# Patient Record
Sex: MALE | Race: WHITE | NOT HISPANIC OR LATINO | Employment: FULL TIME | URBAN - METROPOLITAN AREA
[De-identification: names, ages, dates, MRNs, and addresses within clinical notes are randomized per-mention and may not be internally consistent; named-entity substitution may affect disease eponyms.]

---

## 2017-01-23 ENCOUNTER — APPOINTMENT (OUTPATIENT)
Dept: LAB | Age: 52
End: 2017-01-23
Payer: COMMERCIAL

## 2017-01-23 ENCOUNTER — HOSPITAL ENCOUNTER (OUTPATIENT)
Dept: RADIOLOGY | Age: 52
Discharge: HOME | End: 2017-01-23
Payer: COMMERCIAL

## 2017-01-23 ENCOUNTER — TRANSCRIBE ORDERS (OUTPATIENT)
Dept: ADMINISTRATIVE | Age: 52
End: 2017-01-23

## 2017-01-23 DIAGNOSIS — N20.0 CALCULUS OF KIDNEY: ICD-10-CM

## 2017-01-23 DIAGNOSIS — N20.0 KIDNEY STONE: ICD-10-CM

## 2017-01-23 DIAGNOSIS — N20.0 KIDNEY STONE: Primary | ICD-10-CM

## 2017-01-23 LAB — PSA SERPL-MCNC: 0.5 NG/ML (ref 0–4)

## 2017-01-23 PROCEDURE — 76770 US EXAM ABDO BACK WALL COMP: CPT

## 2017-01-23 PROCEDURE — 36415 COLL VENOUS BLD VENIPUNCTURE: CPT

## 2017-01-23 PROCEDURE — 74000 HB X-RAY EXAM OF ABDOMEN (SINGLE ANTEROPOSTERIOR VIEW): CPT

## 2017-01-23 PROCEDURE — G0103 PSA SCREENING: HCPCS

## 2017-01-25 ENCOUNTER — TRANSCRIBE ORDERS (OUTPATIENT)
Dept: ADMINISTRATIVE | Facility: HOSPITAL | Age: 52
End: 2017-01-25

## 2017-01-25 ENCOUNTER — ALLSCRIPTS OFFICE VISIT (OUTPATIENT)
Dept: OTHER | Facility: OTHER | Age: 52
End: 2017-01-25

## 2017-01-25 DIAGNOSIS — N28.1 ACQUIRED CYST OF KIDNEY: Primary | ICD-10-CM

## 2017-01-25 DIAGNOSIS — N28.1 ACQUIRED CYST OF KIDNEY: ICD-10-CM

## 2017-02-14 ENCOUNTER — APPOINTMENT (OUTPATIENT)
Dept: LAB | Age: 52
End: 2017-02-14
Payer: COMMERCIAL

## 2017-02-14 DIAGNOSIS — N28.1 ACQUIRED CYST OF KIDNEY: ICD-10-CM

## 2017-02-14 LAB
ANION GAP SERPL CALCULATED.3IONS-SCNC: 6 MMOL/L (ref 4–13)
BUN SERPL-MCNC: 22 MG/DL (ref 5–25)
CALCIUM SERPL-MCNC: 9.3 MG/DL (ref 8.3–10.1)
CHLORIDE SERPL-SCNC: 105 MMOL/L (ref 100–108)
CO2 SERPL-SCNC: 29 MMOL/L (ref 21–32)
CREAT SERPL-MCNC: 1.09 MG/DL (ref 0.6–1.3)
GFR SERPL CREATININE-BSD FRML MDRD: >60 ML/MIN/1.73SQ M
GLUCOSE SERPL-MCNC: 100 MG/DL (ref 65–140)
POTASSIUM SERPL-SCNC: 4.6 MMOL/L (ref 3.5–5.3)
SODIUM SERPL-SCNC: 140 MMOL/L (ref 136–145)

## 2017-02-14 PROCEDURE — 36415 COLL VENOUS BLD VENIPUNCTURE: CPT

## 2017-02-14 PROCEDURE — 80048 BASIC METABOLIC PNL TOTAL CA: CPT

## 2017-02-21 ENCOUNTER — HOSPITAL ENCOUNTER (OUTPATIENT)
Dept: RADIOLOGY | Age: 52
Discharge: HOME | End: 2017-02-21
Payer: COMMERCIAL

## 2017-02-21 DIAGNOSIS — N28.1 ACQUIRED CYST OF KIDNEY: ICD-10-CM

## 2017-02-21 PROCEDURE — 74178 CT ABD&PLV WO CNTR FLWD CNTR: CPT

## 2017-02-21 RX ADMIN — IOHEXOL 100 ML: 350 INJECTION, SOLUTION INTRAVENOUS at 08:39

## 2017-03-10 ENCOUNTER — ALLSCRIPTS OFFICE VISIT (OUTPATIENT)
Dept: OTHER | Facility: OTHER | Age: 52
End: 2017-03-10

## 2017-08-01 DIAGNOSIS — E78.1 PURE HYPERGLYCERIDEMIA: ICD-10-CM

## 2017-08-01 DIAGNOSIS — E78.5 HYPERLIPIDEMIA: ICD-10-CM

## 2017-08-01 DIAGNOSIS — Z00.00 ENCOUNTER FOR GENERAL ADULT MEDICAL EXAMINATION WITHOUT ABNORMAL FINDINGS: ICD-10-CM

## 2017-08-01 DIAGNOSIS — Z12.5 ENCOUNTER FOR SCREENING FOR MALIGNANT NEOPLASM OF PROSTATE: ICD-10-CM

## 2017-08-01 DIAGNOSIS — E78.00 PURE HYPERCHOLESTEROLEMIA: ICD-10-CM

## 2017-08-17 ENCOUNTER — GENERIC CONVERSION - ENCOUNTER (OUTPATIENT)
Dept: OTHER | Facility: OTHER | Age: 52
End: 2017-08-17

## 2017-08-17 ENCOUNTER — TRANSCRIBE ORDERS (OUTPATIENT)
Dept: LAB | Age: 52
End: 2017-08-17

## 2017-08-17 ENCOUNTER — APPOINTMENT (OUTPATIENT)
Dept: LAB | Age: 52
End: 2017-08-17
Payer: COMMERCIAL

## 2017-08-17 DIAGNOSIS — Z00.00 ENCOUNTER FOR GENERAL ADULT MEDICAL EXAMINATION WITHOUT ABNORMAL FINDINGS: ICD-10-CM

## 2017-08-17 DIAGNOSIS — E78.1 PURE HYPERGLYCERIDEMIA: ICD-10-CM

## 2017-08-17 DIAGNOSIS — E78.00 PURE HYPERCHOLESTEROLEMIA: ICD-10-CM

## 2017-08-17 LAB
ALBUMIN SERPL BCP-MCNC: 4 G/DL (ref 3.5–5)
ALP SERPL-CCNC: 61 U/L (ref 46–116)
ALT SERPL W P-5'-P-CCNC: 34 U/L (ref 12–78)
ANION GAP SERPL CALCULATED.3IONS-SCNC: 6 MMOL/L (ref 4–13)
AST SERPL W P-5'-P-CCNC: 17 U/L (ref 5–45)
BILIRUB SERPL-MCNC: 0.47 MG/DL (ref 0.2–1)
BUN SERPL-MCNC: 23 MG/DL (ref 5–25)
CALCIUM SERPL-MCNC: 9.5 MG/DL (ref 8.3–10.1)
CHLORIDE SERPL-SCNC: 106 MMOL/L (ref 100–108)
CHOLEST SERPL-MCNC: 245 MG/DL (ref 50–200)
CO2 SERPL-SCNC: 28 MMOL/L (ref 21–32)
CREAT SERPL-MCNC: 1.17 MG/DL (ref 0.6–1.3)
GFR SERPL CREATININE-BSD FRML MDRD: 70 ML/MIN/1.73SQ M
GLUCOSE P FAST SERPL-MCNC: 90 MG/DL (ref 65–99)
HDLC SERPL-MCNC: 62 MG/DL (ref 40–60)
LDLC SERPL CALC-MCNC: 147 MG/DL (ref 0–100)
POTASSIUM SERPL-SCNC: 5.1 MMOL/L (ref 3.5–5.3)
PROT SERPL-MCNC: 7.5 G/DL (ref 6.4–8.2)
PSA SERPL-MCNC: 0.3 NG/ML (ref 0–4)
SODIUM SERPL-SCNC: 140 MMOL/L (ref 136–145)
TRIGL SERPL-MCNC: 179 MG/DL

## 2017-08-17 PROCEDURE — 36415 COLL VENOUS BLD VENIPUNCTURE: CPT

## 2017-08-17 PROCEDURE — 80061 LIPID PANEL: CPT

## 2017-08-17 PROCEDURE — 80053 COMPREHEN METABOLIC PANEL: CPT

## 2017-08-17 PROCEDURE — G0103 PSA SCREENING: HCPCS

## 2017-08-18 ENCOUNTER — GENERIC CONVERSION - ENCOUNTER (OUTPATIENT)
Dept: OTHER | Facility: OTHER | Age: 52
End: 2017-08-18

## 2017-08-29 ENCOUNTER — ALLSCRIPTS OFFICE VISIT (OUTPATIENT)
Dept: OTHER | Facility: OTHER | Age: 52
End: 2017-08-29

## 2017-12-26 ENCOUNTER — ALLSCRIPTS OFFICE VISIT (OUTPATIENT)
Dept: OTHER | Facility: OTHER | Age: 52
End: 2017-12-26

## 2017-12-27 NOTE — PROGRESS NOTES
Assessment   1  URI (upper respiratory infection) (465 9) (J06 9)    Plan   URI (upper respiratory infection)    · Zithromax Z-Pedro 250 MG Oral Tablet (Azithromycin); TAKE 2 TABLETS ON DAY 1    THEN TAKE 1 TABLET A DAY FOR 4 DAYS   · Follow Up if Not Better Evaluation and Treatment  Follow-up  Status: Complete  Done:    76DGH8903 02:04PM   · Drink at least 6 glasses of water or juice a day ; Status:Complete;   Done: 83HZM9516    02:04PM   · The following home treatments may soothe a sore throat ; Status:Complete;   Done:    94YBS1216 02:04PM   · Use a cough medicine to help you get adequate rest ; Status:Complete;   Done:    71QQS6589 02:04PM   · Call [phone] if: The symptoms are not better in 7 days ; Status:Complete;   Done:    76XLQ2682 02:04PM   · Call [phone] if: The symptoms seem worse ; Status:Complete;   Done:    87RMO5143 02:04PM    Discussion/Summary      Continue with symptom treatment for cough  Chief Complaint   1  Fever   2  Nasal Symptoms    History of Present Illness   HPI: Patient presents with a five-day history of sore throat with nasal congestion and cough at times productive of clear yellow phlegm  Over the weekend he started with a T to 102  he has been using Mucinex D  Nonsmoker  Up-to-date with flu vaccine  Review of Systems        Constitutional: feeling poorly, but-- as noted in HPI       ENT: as noted in HPI-- and-- no nasal discharge--       no earache  Cardiovascular: no chest pain  Respiratory: as noted in HPI,-- no shortness of breath-- and-- no wheezing  Gastrointestinal: no nausea,-- no vomiting-- and-- no diarrhea  Musculoskeletal: no myalgias  Neurological: no headache  Active Problems   1  Acute sinusitis (461 9) (J01 90)   2  Complex renal cyst (753 10) (N28 1)   3  Essential hypertriglyceridemia (272 1) (E78 1)   4  Hematochezia (578 1) (K92 1)   5  Hypercholesterolemia (272 0) (E78 00)   6  Hyperlipidemia (272 4) (E78 5)   7  Need for immunization against influenza (V04 81) (Z23)   8  Special screening examination for neoplasm of prostate (V76 44) (Z12 5)   9  Well adult exam (V70 0) (Z00 00)    Past Medical History   1  History of acute sinusitis (V12 69) (Z87 09)   2  History of allergic rhinitis (V12 69) (Z87 09)   3  History of chronic sinusitis (V12 69) (Z87 09)   4  History of hyperlipidemia (V12 29) (Z86 39)    Family History   Mother    1  Family history of kidney stones (V18 69) (Z84 1)   2  Family history of Hyperlipidemia   3  Family history of Hypertension (V17 49)  Father    4  Family history of Hyperlipidemia  Brother    5  Family history of kidney stones (V18 69) (Z84 1)    Social History    ·    · Never A Smoker   · Occasional alcohol use    Surgical History   1  History of Cholecystectomy   2  History of Hernia Repair   3  History of Knee Surgery   4  History of Shoulder Surgery    Current Meds    1  Aspirin 81 MG TABS; Therapy: (Recorded:34Ipg8012) to Recorded   2  Atorvastatin Calcium 80 MG Oral Tablet; TAKE 1 TABLET AT BEDTIME; Therapy: 66LED6326 to (Evaluate:68Dny2718)  Requested for: 76TQV0762; Last     Rx:45Duc9875 Ordered   3  Fenofibrate 160 MG Oral Tablet; Take 1 tablet daily; Therapy: 27AXY9130 to (Evaluate:27Bev7118)  Requested for: 52Azx3439; Last     Rx:32Xvn9970 Ordered   4  Magnesium 250 MG Oral Tablet; Therapy: ([place] ) to Recorded   5  Multivitamin TABS; Therapy: ([place] ) to Recorded   6  Super B Complex TABS; Therapy: ([place] ) to Recorded   7  Vitamin D3 1000 UNIT Oral Capsule; Therapy: ([place] ) to Recorded   8  Vitamin E 400 UNIT Oral Capsule; Therapy: (Recorded:14Trp1114) to Recorded    Allergies   1   No Known Drug Allergies    Vitals    Recorded: 75LWN7805 01:47PM   Temperature 95 9 F   Heart Rate 84   Respiration 16   Systolic 686, LUE, Sitting   Diastolic 64, LUE, Sitting   BP CUFF SIZE Large   Height 6 ft 1 in Weight 227 lb 6 4 oz   BMI Calculated 30   BSA Calculated 2 27     Physical Exam        Constitutional      General appearance: No acute distress, well appearing and well nourished  Head and Face      Palpation of the face and sinuses: No sinus tenderness  Eyes      Conjunctiva and lids: No erythema, swelling or discharge  Ears, Nose, Mouth, and Throat      Otoscopic examination: Tympanic membranes translucent with normal light reflex  Canals patent without erythema  Oropharynx: Normal with no erythema, edema, exudate or lesions  Neck      Neck: Supple, symmetric, trachea midline, no masses  Thyroid: Normal, no thyromegaly  Pulmonary      Respiratory effort: No increased work of breathing or signs of respiratory distress  Auscultation of lungs: Clear to auscultation  Cardiovascular      Auscultation of heart: Normal rate and rhythm, normal S1 and S2, no murmurs  Lymphatic      Palpation of lymph nodes in neck: No lymphadenopathy  Skin      Skin and subcutaneous tissue: Normal without rashes or lesions         Future Appointments      Date/Time Provider Specialty Site   03/14/2018 09:00 AM Bharath Kramer, [address] FOR UROLOGY [place]     Signatures    Electronically signed by : BEKA Ponce ; Dec 26 2017  2:05PM EST                       (Author)

## 2018-01-12 NOTE — RESULT NOTES
Discussion/Summary   Normal PSA      Verified Results  (1) PSA (SCREEN) (Dx V76 44 Screen for Prostate Cancer) 48RMR0114 07:12AM [facility]     Test Name Result Flag Reference   PROSTATE SPECIFIC ANTIGEN 0 3 ng/mL  0 0-4 0   American Urological Association Guidelines define biochemical recurrence of prostate cancer as a detectable or rising PSA value post-radical prostatectomy that is greater than or equal to 0 2 ng/mL with a second confirmatory level of greater than or equal to 0 2 ng/mL  This is a patient instruction: This test is non-fasting  Please drink two glasses of water morning of bloodwork

## 2018-01-13 VITALS
WEIGHT: 222 LBS | RESPIRATION RATE: 16 BRPM | TEMPERATURE: 96.6 F | BODY MASS INDEX: 29.42 KG/M2 | DIASTOLIC BLOOD PRESSURE: 78 MMHG | HEIGHT: 73 IN | SYSTOLIC BLOOD PRESSURE: 122 MMHG | HEART RATE: 68 BPM

## 2018-01-14 VITALS
HEIGHT: 73 IN | DIASTOLIC BLOOD PRESSURE: 80 MMHG | WEIGHT: 224 LBS | HEART RATE: 90 BPM | SYSTOLIC BLOOD PRESSURE: 132 MMHG | BODY MASS INDEX: 29.69 KG/M2

## 2018-01-14 NOTE — RESULT NOTES
Discussion/Summary   Labs are stable but the bad cholesterol went up compared to last time  We can review at our visit  Verified Results  (1) LIPID PANEL, FASTING 40Sho7894 07:12AM Gina Mccarthy Order Number: [id number]     Test Name Result Flag Reference   CHOLESTEROL 245 mg/dL H    HDL,DIRECT 62 mg/dL H 40-60   Specimen collection should occur prior to Metamizole administration due to the potential for falsley depressed results  LDL CHOLESTEROL CALCULATED 147 mg/dL H 0-100   Triglyceride:        Normal ??? ??? ??? ??? ??? ??? ??? <150 mg/dl   ??? ??? ???Borderline High ??? ??? 150-199 mg/dl   ??? ??? ? ?? High ??? ??? ??? ??? ??? ??? ??? 200-499 mg/dl   ??? ??? ? ??Very High ??? ??? ??? ??? ??? >499 mg/dl      Cholesterol:       Desirable ??? ??? ??? ??? <200 mg/dl   ??? ??? Borderline High ??? 200-239 mg/dl   ??? ??? High ??? ??? ??? ??? ??? ??? >239 mg/dl      HDL Cholesterol:       High ??? ???>59 mg/dL   ??? ??? Low ??? ??? <41 mg/dL      This screening LDL is a calculated result  It does not have the accuracy of the Direct Measured LDL in the monitoring of patients with hyperlipidemia and/or statin therapy  Direct Measure LDL (WGY392) must be ordered separately in these patients  TRIGLYCERIDES 179 mg/dL H <=150   Specimen collection should occur prior to N-Acetylcysteine or Metamizole administration due to the potential for falsely depressed results

## 2018-01-16 NOTE — MISCELLANEOUS
Message   Recorded as Task   Date: 07/06/2016 09:01 AM, Created By: Doris Chavez   Task Name: Medical Complaint Callback   Assigned To: Rae Berry   Regarding Patient: Ana Rosa Graf, Status: In Progress   Comment:    Liliam,Guera - 06 Jul 2016 9:01 AM     TASK CREATED  Caller: Self; Medical Complaint; [phone] (Mobile Phone)  patient called  He has lot of facial pressure and nasal congestion, looking for advice on OTC meds for sinus sxs, please call   Laila Doran - 06 Jul 2016 10:02 AM     TASK EDITED  LMOM to call office back  Laila Doran - 06 Jul 2016 10:02 AM     TASK IN PROGRESS   Laila Doran - 06 Jul 2016 10:13 AM     TASK EDITED  Patient called back and stated he has been using OTC Sudafed with no improvment  I asked if thinks he needs an antibiotic, but he states he wanted to try other OTC meds first  I advised for him to try Mucinex and OTC Flonase  Patient agreed and understood and stated if he is not better in a week he will call back  Active Problems    1  Essential hypertriglyceridemia (272 1) (E78 1)   2  Hematochezia (578 1) (K92 1)   3  Hypercholesterolemia (272 0) (E78 0)   4  Hyperlipidemia (272 4) (E78 5)   5  Kidney stones (592 0) (N20 0)   6  Well adult exam (V70 0) (Z00 00)    Current Meds   1  Atorvastatin Calcium 80 MG Oral Tablet; Take 1 tablet daily; Therapy: 28APP8495 to (Last Rx:26Aau1636)  Requested for: 89ZLX1534 Ordered   2  Fenofibrate 160 MG Oral Tablet; Take 1 tablet daily; Therapy: (Recorded:09Nov2015) to Recorded   3  Fenofibrate 160 MG Oral Tablet; Take 1 tablet daily; Therapy: 24SXO5788 to (Last Rx:69Elv5392)  Requested for: 81Fwm0087 Ordered    Allergies    1   No Known Drug Allergies    Signatures   Electronically signed by : Augustine Forbes, ; Jul 6 2016 10:14AM EST                       (Author)    Electronically signed by : BEKA Carrera ; Jul 6 2016 12:58PM EST                       (Author)

## 2018-01-18 NOTE — PROGRESS NOTES
Assessment    1  Complex renal cyst (753 10) (N28 1)   2  Kidney stones (592 0) (N20 0)   3  Special screening examination for neoplasm of prostate (V76 44) (Z12 5)    Plan  Kidney stones    · * XR ABDOMEN 1 VIEW KUB; Status:Active; Requested for:15Deu1542; Perform:[facility]; EWF:83FBC5033;EGIFSZA; For:Kidney stones; Ordered By:Susanne Sanford;   · Follow-up visit in 1 year Evaluation and Treatment  Follow-up  Status: Hold For -  Scheduling  Requested for: 88PLG0804   Ordered; For: Kidney stones; Ordered By: aZch Call Performed:  Due: 57GWN9030  Special screening examination for neoplasm of prostate    · (1) PSA (SCREEN) (Dx V76 44 Screen for Prostate Cancer); Status:Active; Requested  for:30Xwe7175; Perform:[facility]; UJV:13MXG5101;WJTILBG; For:Special screening examination for neoplasm of prostate; Ordered By:Susanne Sanford; Discussion/Summary  Discussion Summary:   Fortunately, the CT scan reveals only renal cysts which are benign  There is no evidence of malignancy  There are tiny bilateral calculi, largest measuring about 3 mm  No intervention is necessary at this time  He'll follow-up in one year with KUB and PSA  We may consider follow-up ultrasound at some point in the future as well although these cysts appear benign  Medication SE Review and Pt Understands Tx: The treatment plan was reviewed with the patient/guardian  The patient/guardian understands and agrees with the treatment plan      Chief Complaint  Chief Complaint Free Text Note Form: Patient presents for B/L renal cysts / stones      History of Present Illness  HPI: Patient seen in follow-up to review CT scan  He had a recent ultrasound with abnormal finding concerning for cystic renal mass  He denies any significant flank pain other than chronic back discomfort  He denies any urinary symptoms        Review of Systems  Complete-Male Urology:   Constitutional: No fever or chills, feels well, no tiredness, no recent weight gain or weight loss  Respiratory: No complaints of shortness of breath, no wheezing, no cough, no SOB on exertion, no orthopnea or PND  Cardiovascular: No complaints of slow heart rate, no fast heart rate, no chest pain, no palpitations, no leg claudication, no lower extremity  Gastrointestinal: No complaints of abdominal pain, no constipation, no nausea or vomiting, no diarrhea or bloody stools  Genitourinary: Empty sensation and stream quality fair, but no dysuria, no urinary hesitancy, no hematuria, no incontinence and no feelings of urinary urgency    The patient presents with complaints of 1 episode of nocturia  Musculoskeletal: No complaints of arthralgia, no myalgias, no joint swelling or stiffness, no limb pain or swelling  Integumentary: No complaints of skin rash or skin lesions, no itching, no skin wound, no dry skin  Hematologic/Lymphatic: No complaints of swollen glands, no swollen glands in the neck, does not bleed easily, no easy bruising  Neurological: No compliants of headache, no confusion, no convulsions, no numbness or tingling, no dizziness or fainting, no limb weakness, no difficulty walking  Active Problems    1  Acute sinusitis (461 9) (J01 90)   2  Complex renal cyst (753 10) (N28 1)   3  Essential hypertriglyceridemia (272 1) (E78 1)   4  Hematochezia (578 1) (K92 1)   5  Hypercholesterolemia (272 0) (E78 00)   6  Hyperlipidemia (272 4) (E78 5)   7  Kidney stones (592 0) (N20 0)   8  Need for prophylactic vaccination and inoculation against influenza (V04 81) (Z23)   9  Well adult exam (V70 0) (Z00 00)    Past Medical History    1  History of acute sinusitis (V12 69) (Z87 09)   2  History of allergic rhinitis (V12 69) (Z87 09)   3  History of chronic sinusitis (V12 69) (Z87 09)   4  History of hyperlipidemia (V12 29) (Z86 39)    Surgical History    1  History of Cholecystectomy   2  History of Hernia Repair   3  History of Knee Surgery   4  History of Shoulder Surgery    Family History  Mother    1  Family history of kidney stones (V18 69) (Z84 1)   2  Family history of Hyperlipidemia   3  Family history of Hypertension (V17 49)  Father    4  Family history of Hyperlipidemia  Brother    5  Family history of kidney stones (V18 69) (Z84 1)    Social History    ·    · Never A Smoker   · Occasional alcohol use    Current Meds   1  Atorvastatin Calcium 80 MG Oral Tablet; TAKE 1 TABLET DAILY- pls get labs and make   appointment; Therapy: 99ZWP5496 to ([place])  Requested for: 11JUM3092; Last   Rx:02Jan2017 Ordered   2  Fenofibrate 160 MG Oral Tablet; Take 1 tablet daily; Therapy: (Recorded:09Nov2015) to Recorded   3  Fenofibrate 160 MG Oral Tablet; Take 1 tablet daily; Therapy: 19EQB8294 to (Last Rx:27Apr2016)  Requested for: 27Apr2016 Ordered    Allergies    1  No Known Drug Allergies    Vitals  Vital Signs    Recorded: 85WYI9823 11:17AM   Heart Rate 72   Systolic 527   Diastolic 84   Height 6 ft 1 in   Weight 216 lb 8 oz   BMI Calculated 28 56   BSA Calculated 2 23     Physical Exam    Constitutional   General appearance: No acute distress, well appearing and well nourished      Musculoskeletal   Gait and station: Normal        Signatures   Electronically signed by : BEKA Katz ; Mar 10 2017 11:46AM EST                       (Author)

## 2018-01-22 VITALS
DIASTOLIC BLOOD PRESSURE: 84 MMHG | SYSTOLIC BLOOD PRESSURE: 132 MMHG | HEIGHT: 73 IN | WEIGHT: 216.5 LBS | HEART RATE: 72 BPM | BODY MASS INDEX: 28.69 KG/M2

## 2018-01-23 VITALS
RESPIRATION RATE: 16 BRPM | HEIGHT: 73 IN | BODY MASS INDEX: 30.14 KG/M2 | DIASTOLIC BLOOD PRESSURE: 64 MMHG | WEIGHT: 227.4 LBS | SYSTOLIC BLOOD PRESSURE: 102 MMHG | HEART RATE: 84 BPM | TEMPERATURE: 95.9 F

## 2018-02-10 DIAGNOSIS — Z12.5 ENCOUNTER FOR SCREENING FOR MALIGNANT NEOPLASM OF PROSTATE: ICD-10-CM

## 2018-02-10 DIAGNOSIS — N20.0 CALCULUS OF KIDNEY: ICD-10-CM

## 2018-04-02 ENCOUNTER — TELEPHONE (OUTPATIENT)
Dept: FAMILY MEDICINE CLINIC | Facility: CLINIC | Age: 53
End: 2018-04-02

## 2018-04-02 RX ORDER — ATORVASTATIN CALCIUM 80 MG/1
80 TABLET, FILM COATED ORAL DAILY
Qty: 90 TABLET | Refills: 0 | Status: CANCELLED | OUTPATIENT
Start: 2018-04-02 | End: 2018-07-01

## 2018-04-02 NOTE — TELEPHONE ENCOUNTER
Patient called  At last visit, you mentioned to him you may want to take him off of one of his cholesterol meds  He is currently taking Fenofibrate 160 mg and Atorvastatin 80 mg   He is not sure which one you wanted to stop, but he is running low on Atorvastatin and wanted to clarify prior to getting refill

## 2018-04-03 DIAGNOSIS — E78.01 FAMILIAL HYPERCHOLESTEROLEMIA: Primary | ICD-10-CM

## 2018-04-03 RX ORDER — ATORVASTATIN CALCIUM 80 MG/1
80 TABLET, FILM COATED ORAL DAILY
Qty: 90 TABLET | Refills: 3 | Status: SHIPPED | OUTPATIENT
Start: 2018-04-03 | End: 2018-10-27 | Stop reason: SDUPTHER

## 2018-06-19 ENCOUNTER — OFFICE VISIT (OUTPATIENT)
Dept: UROLOGY | Facility: AMBULATORY SURGERY CENTER | Age: 53
End: 2018-06-19
Payer: COMMERCIAL

## 2018-06-19 VITALS
DIASTOLIC BLOOD PRESSURE: 70 MMHG | SYSTOLIC BLOOD PRESSURE: 128 MMHG | HEIGHT: 73 IN | WEIGHT: 227 LBS | BODY MASS INDEX: 30.09 KG/M2

## 2018-06-19 DIAGNOSIS — N20.0 KIDNEY STONES: Primary | ICD-10-CM

## 2018-06-19 LAB
SL AMB  POCT GLUCOSE, UA: NORMAL
SL AMB LEUKOCYTE ESTERASE,UA: NORMAL
SL AMB POCT BILIRUBIN,UA: NORMAL
SL AMB POCT BLOOD,UA: NORMAL
SL AMB POCT CLARITY,UA: NORMAL
SL AMB POCT COLOR,UA: YELLOW
SL AMB POCT KETONES,UA: NORMAL
SL AMB POCT NITRITE,UA: NORMAL
SL AMB POCT PH,UA: 5
SL AMB POCT SPECIFIC GRAVITY,UA: 1.01
SL AMB POCT URINE PROTEIN: NORMAL
SL AMB POCT UROBILINOGEN: NORMAL

## 2018-06-19 PROCEDURE — 99213 OFFICE O/P EST LOW 20 MIN: CPT | Performed by: NURSE PRACTITIONER

## 2018-06-19 PROCEDURE — 81002 URINALYSIS NONAUTO W/O SCOPE: CPT | Performed by: NURSE PRACTITIONER

## 2018-06-19 NOTE — PROGRESS NOTES
6/19/2018    Alaina Laws  1965  [id number]        Assessment  Nephrolithiasis  Renal cyst  Prostate cancer screening    Discussion  Paolo Cramer is a 45 y o  male being managed by Dr Sanford  He is currently asymptomatic  There is no evidence of microscopic hematuria or bacteria on his urine dip today in the office  We discussed obtaining renal imaging at this time to confirm passage of stone  He defers as he believes the stone has passed  He was instructed to call with any recurrence of hematuria or pain  Renal imaging will be obtained at this time  Otherwise the patient should return in 2/2019 with a KUB  EVA will be performed at this time  PSA is ordered by his PCP  His PSA as of 8/2017 was 0 3  All questions answered  History of Present Illness  45 y o  male with a history of nephrolithiasis and renal cyst presents today for 1 year follow up  Patient states last week he started with right flank pain that radiated into the right lower abdomen  He had gross hematuria, nausea and vomiting at this time  His last episode of pain was Thursday morning  During this episode he felt a twinge in the urethra and feels the stone passed at this time  Hematuria has resolved  Currently, he denies any lower urinary tract symptoms  He denies any nausea, vomiting, or fevers since  He denies any other stone episodes  Review of Systems  Review of Systems   Constitutional: Negative  HENT: Negative  Respiratory: Negative  Cardiovascular: Negative  Gastrointestinal: Negative  Genitourinary:        As per HPI   Musculoskeletal: Negative  Skin: Negative  Neurological: Negative  Hematological: Negative  Urinary Incontinence Screening      Most Recent Value   Urinary Incontinence   Urinary Incontinence? No   Incomplete emptying? No   Urinary frequency? No   Urinary urgency? No   Urinary hesitancy? No   Dysuria (painful difficult urination)?   Yes   Nocturia (waking up to use the bathroom)? No   Straining (having to push to go)? No   Weak stream?  No   Intermittent stream?  No   Post void dribbling? No        AUA SYMPTOM SCORE      Most Recent Value   AUA SYMPTOM SCORE   How often have you had a sensation of not emptying your bladder completely after you finished urinating? 1   How often have you had to urinate again less than two hours after you finished urinating? 1   How often have you found you stopped and started again several times when you urinate?  0   How often have you found it difficult to postpone urination? 0   How often have you had a weak urinary stream?  1   How often have you had to push or strain to begin urination? 0   How many times did you most typically get up to urinate from the time you went to bed at night until the time you got up in the morning?  0   Quality of Life: If you were to spend the rest of your life with your urinary condition just the way it is now, how would you feel about that?  4   AUA SYMPTOM SCORE  3            Past Medical History  Past Medical History:   Diagnosis Date    Acute sinusitis     Allergic rhinitis     Hyperlipemia        Past Surgical History  Past Surgical History:   Procedure Laterality Date    CHOLECYSTECTOMY      COLONOSCOPY N/A 1/22/2016    Procedure: COLONOSCOPY;  Surgeon: Eddie Quiles MD;  Location: [facility]; Service:     HERNIA REPAIR      ORTHOPEDIC SURGERY Right 1997    Knee    SHOULDER ARTHROSCOPY Right         Past Family History  History reviewed  No pertinent family history  Past Social history  Social History     Social History    Marital status: /Civil Union     Spouse name: N/A    Number of children: N/A    Years of education: N/A     Occupational History    Not on file       Social History Main Topics    Smoking status: Never Smoker    Smokeless tobacco: Never Used    Alcohol use Yes      Comment: social    Drug use: Unknown    Sexual activity: Not on file     Other Topics Concern    Not on file     Social History Narrative    No narrative on file       Current Medications  Current Outpatient Prescriptions   Medication Sig Dispense Refill    aspirin 81 MG tablet Take 81 mg by mouth daily   atorvastatin (LIPITOR) 80 mg tablet Take 1 tablet (80 mg total) by mouth daily 90 tablet 3    b complex vitamins capsule Take 1 capsule by mouth daily   magnesium citrate solution Take 25 mL by mouth once   Multiple Vitamin (MULTIVITAMIN) capsule Take 1 capsule by mouth daily   vitamin E, tocopherol, 400 units capsule Take 400 Units by mouth daily   calcium carbonate-vitamin D (OSCAL-D) 500 mg-200 units per tablet Take 1 tablet by mouth 2 (two) times a day with meals  No current facility-administered medications for this visit  Allergies  No Known Allergies    Past Medical History, Social History, Family History, medications and allergies were reviewed and updated as appropriate  Vitals  Vitals:    06/19/18 1008   BP: 128/70   BP Location: Left arm   Patient Position: Sitting   Weight: 103 kg (227 lb)   Height: 6' 1" (1 854 m)       Physical Exam  Skin: warm, dry, intact  Pulmonary: Non-labored breathing  Abdomen: Soft, non-tender, non-distended  Musculoskeletal: AROM with no joint deformity or tenderness    Neurology: Alert and oriented        Results  Lab Results   Component Value Date    PSA 0 3 08/17/2017    PSA 0 5 01/23/2017    PSA 0 3 10/22/2014     Lab Results   Component Value Date    GLUCOSE 100 02/14/2017    CALCIUM 9 5 08/17/2017     08/17/2017    K 5 1 08/17/2017    CO2 28 08/17/2017     08/17/2017    BUN 23 08/17/2017    CREATININE 1 17 08/17/2017     Lab Results   Component Value Date    WBC 6 29 11/20/2015    HGB 15 4 11/20/2015    HCT 45 1 11/20/2015    MCV 88 11/20/2015     11/20/2015

## 2018-08-29 ENCOUNTER — TELEPHONE (OUTPATIENT)
Dept: UROLOGY | Facility: MEDICAL CENTER | Age: 53
End: 2018-08-29

## 2018-08-29 ENCOUNTER — APPOINTMENT (OUTPATIENT)
Dept: RADIOLOGY | Age: 53
End: 2018-08-29
Payer: COMMERCIAL

## 2018-08-29 DIAGNOSIS — N20.0 KIDNEY STONES: ICD-10-CM

## 2018-08-29 PROCEDURE — 74018 RADEX ABDOMEN 1 VIEW: CPT

## 2018-08-29 NOTE — TELEPHONE ENCOUNTER
FYI: Pt is having XR done today 8/29, is having flank pain, thinks it's related to kidney stones, please advise    [phone]

## 2018-08-29 NOTE — TELEPHONE ENCOUNTER
Let patient know that since he is getting X-Ray done , the moment we get results we will reach out and schedule follow up

## 2018-08-30 NOTE — TELEPHONE ENCOUNTER
Patient called back for results of KUB - told him they were not read yet and would notify them when they were

## 2018-08-31 NOTE — TELEPHONE ENCOUNTER
Patient called again asking for results of KUB  No results present in chart  Patient c/o pain on L side 4-5 on pain scale today  Denies N/V, fever, gross hematuria at this time  Patient travelling next week so is interested in knowing results  Advised patient will call Radiology reading room to have KUB read sooner rather than later  Reading room notified

## 2018-09-12 ENCOUNTER — TELEPHONE (OUTPATIENT)
Dept: FAMILY MEDICINE CLINIC | Facility: CLINIC | Age: 53
End: 2018-09-12

## 2018-09-13 DIAGNOSIS — E78.01 FAMILIAL HYPERCHOLESTEROLEMIA: ICD-10-CM

## 2018-09-13 DIAGNOSIS — Z13.6 SCREENING FOR CARDIOVASCULAR CONDITION: Primary | ICD-10-CM

## 2018-10-27 DIAGNOSIS — E78.01 FAMILIAL HYPERCHOLESTEROLEMIA: ICD-10-CM

## 2018-10-29 RX ORDER — ATORVASTATIN CALCIUM 80 MG/1
TABLET, FILM COATED ORAL
Qty: 90 TABLET | Refills: 1 | Status: SHIPPED | OUTPATIENT
Start: 2018-10-29 | End: 2019-05-17 | Stop reason: SDUPTHER

## 2019-01-02 ENCOUNTER — TELEPHONE (OUTPATIENT)
Dept: FAMILY MEDICINE CLINIC | Facility: CLINIC | Age: 54
End: 2019-01-02

## 2019-01-02 DIAGNOSIS — Z12.5 SCREENING FOR PROSTATE CANCER: Primary | ICD-10-CM

## 2019-01-02 NOTE — TELEPHONE ENCOUNTER
Patient is scheduled for appointment on Friday and would like to complete LABS that are in his chart  He is requesting a PSA order as well  Can you add this to LABS? Wants to complete bloodwork tomorrow morning    Call patient when in [place]    [phone]

## 2019-01-04 ENCOUNTER — APPOINTMENT (OUTPATIENT)
Dept: LAB | Age: 54
End: 2019-01-04
Payer: COMMERCIAL

## 2019-01-04 ENCOUNTER — OFFICE VISIT (OUTPATIENT)
Dept: FAMILY MEDICINE CLINIC | Facility: CLINIC | Age: 54
End: 2019-01-04
Payer: COMMERCIAL

## 2019-01-04 VITALS
SYSTOLIC BLOOD PRESSURE: 118 MMHG | HEART RATE: 72 BPM | BODY MASS INDEX: 31.01 KG/M2 | HEIGHT: 73 IN | DIASTOLIC BLOOD PRESSURE: 74 MMHG | WEIGHT: 234 LBS | RESPIRATION RATE: 16 BRPM | TEMPERATURE: 96.6 F

## 2019-01-04 DIAGNOSIS — E78.2 MIXED HYPERLIPIDEMIA: ICD-10-CM

## 2019-01-04 DIAGNOSIS — Z23 NEED FOR SHINGLES VACCINE: ICD-10-CM

## 2019-01-04 DIAGNOSIS — Z00.00 WELL ADULT EXAM: Primary | ICD-10-CM

## 2019-01-04 DIAGNOSIS — Z12.5 SCREENING FOR PROSTATE CANCER: ICD-10-CM

## 2019-01-04 DIAGNOSIS — Z13.6 SCREENING FOR CARDIOVASCULAR CONDITION: ICD-10-CM

## 2019-01-04 DIAGNOSIS — E78.01 FAMILIAL HYPERCHOLESTEROLEMIA: ICD-10-CM

## 2019-01-04 DIAGNOSIS — E66.9 OBESITY (BMI 30-39.9): ICD-10-CM

## 2019-01-04 PROBLEM — N28.1 COMPLEX RENAL CYST: Status: ACTIVE | Noted: 2017-01-25

## 2019-01-04 LAB
ALBUMIN SERPL BCP-MCNC: 4.1 G/DL (ref 3.5–5)
ALP SERPL-CCNC: 90 U/L (ref 46–116)
ALT SERPL W P-5'-P-CCNC: 57 U/L (ref 12–78)
ANION GAP SERPL CALCULATED.3IONS-SCNC: 8 MMOL/L (ref 4–13)
AST SERPL W P-5'-P-CCNC: 25 U/L (ref 5–45)
BASOPHILS # BLD AUTO: 0.03 THOUSANDS/ΜL (ref 0–0.1)
BASOPHILS NFR BLD AUTO: 0 % (ref 0–1)
BILIRUB SERPL-MCNC: 0.61 MG/DL (ref 0.2–1)
BUN SERPL-MCNC: 22 MG/DL (ref 5–25)
CALCIUM SERPL-MCNC: 9.5 MG/DL (ref 8.3–10.1)
CHLORIDE SERPL-SCNC: 103 MMOL/L (ref 100–108)
CHOLEST SERPL-MCNC: 292 MG/DL (ref 50–200)
CO2 SERPL-SCNC: 27 MMOL/L (ref 21–32)
CREAT SERPL-MCNC: 0.94 MG/DL (ref 0.6–1.3)
EOSINOPHIL # BLD AUTO: 0.11 THOUSAND/ΜL (ref 0–0.61)
EOSINOPHIL NFR BLD AUTO: 2 % (ref 0–6)
ERYTHROCYTE [DISTWIDTH] IN BLOOD BY AUTOMATED COUNT: 13.3 % (ref 11.6–15.1)
GFR SERPL CREATININE-BSD FRML MDRD: 91 ML/MIN/1.73SQ M
GLUCOSE P FAST SERPL-MCNC: 92 MG/DL (ref 65–99)
HCT VFR BLD AUTO: 51.3 % (ref 36.5–49.3)
HDLC SERPL-MCNC: 61 MG/DL (ref 40–60)
HGB BLD-MCNC: 16.9 G/DL (ref 12–17)
IMM GRANULOCYTES # BLD AUTO: 0.02 THOUSAND/UL (ref 0–0.2)
IMM GRANULOCYTES NFR BLD AUTO: 0 % (ref 0–2)
LDLC SERPL CALC-MCNC: 169 MG/DL (ref 0–100)
LYMPHOCYTES # BLD AUTO: 2.03 THOUSANDS/ΜL (ref 0.6–4.47)
LYMPHOCYTES NFR BLD AUTO: 29 % (ref 14–44)
MCH RBC QN AUTO: 30 PG (ref 26.8–34.3)
MCHC RBC AUTO-ENTMCNC: 32.9 G/DL (ref 31.4–37.4)
MCV RBC AUTO: 91 FL (ref 82–98)
MONOCYTES # BLD AUTO: 0.52 THOUSAND/ΜL (ref 0.17–1.22)
MONOCYTES NFR BLD AUTO: 8 % (ref 4–12)
NEUTROPHILS # BLD AUTO: 4.2 THOUSANDS/ΜL (ref 1.85–7.62)
NEUTS SEG NFR BLD AUTO: 61 % (ref 43–75)
NONHDLC SERPL-MCNC: 231 MG/DL
NRBC BLD AUTO-RTO: 0 /100 WBCS
PLATELET # BLD AUTO: 191 THOUSANDS/UL (ref 149–390)
PMV BLD AUTO: 9.9 FL (ref 8.9–12.7)
POTASSIUM SERPL-SCNC: 4.6 MMOL/L (ref 3.5–5.3)
PROT SERPL-MCNC: 7.5 G/DL (ref 6.4–8.2)
PSA SERPL-MCNC: 0.5 NG/ML (ref 0–4)
RBC # BLD AUTO: 5.63 MILLION/UL (ref 3.88–5.62)
SODIUM SERPL-SCNC: 138 MMOL/L (ref 136–145)
TRIGL SERPL-MCNC: 312 MG/DL
WBC # BLD AUTO: 6.91 THOUSAND/UL (ref 4.31–10.16)

## 2019-01-04 PROCEDURE — 80053 COMPREHEN METABOLIC PANEL: CPT

## 2019-01-04 PROCEDURE — G0103 PSA SCREENING: HCPCS

## 2019-01-04 PROCEDURE — 99396 PREV VISIT EST AGE 40-64: CPT | Performed by: FAMILY MEDICINE

## 2019-01-04 PROCEDURE — 80061 LIPID PANEL: CPT

## 2019-01-04 PROCEDURE — 36415 COLL VENOUS BLD VENIPUNCTURE: CPT

## 2019-01-04 PROCEDURE — 85025 COMPLETE CBC W/AUTO DIFF WBC: CPT

## 2019-05-17 DIAGNOSIS — E78.01 FAMILIAL HYPERCHOLESTEROLEMIA: ICD-10-CM

## 2019-05-17 RX ORDER — ATORVASTATIN CALCIUM 80 MG/1
TABLET, FILM COATED ORAL
Qty: 90 TABLET | Refills: 1 | Status: SHIPPED | OUTPATIENT
Start: 2019-05-17 | End: 2019-08-19 | Stop reason: SDUPTHER

## 2019-06-05 ENCOUNTER — OFFICE VISIT (OUTPATIENT)
Dept: UROLOGY | Facility: AMBULATORY SURGERY CENTER | Age: 54
End: 2019-06-05
Payer: COMMERCIAL

## 2019-06-05 VITALS
HEART RATE: 90 BPM | WEIGHT: 230.6 LBS | SYSTOLIC BLOOD PRESSURE: 120 MMHG | HEIGHT: 73 IN | BODY MASS INDEX: 30.56 KG/M2 | DIASTOLIC BLOOD PRESSURE: 62 MMHG

## 2019-06-05 DIAGNOSIS — N20.0 KIDNEY STONES: Primary | ICD-10-CM

## 2019-06-05 PROCEDURE — 99213 OFFICE O/P EST LOW 20 MIN: CPT | Performed by: UROLOGY

## 2019-08-19 DIAGNOSIS — E78.01 FAMILIAL HYPERCHOLESTEROLEMIA: ICD-10-CM

## 2019-08-19 RX ORDER — ATORVASTATIN CALCIUM 80 MG/1
80 TABLET, FILM COATED ORAL DAILY
Qty: 90 TABLET | Refills: 1 | Status: SHIPPED | OUTPATIENT
Start: 2019-08-19 | End: 2020-01-07 | Stop reason: SDUPTHER

## 2019-09-13 ENCOUNTER — IMMUNIZATIONS (OUTPATIENT)
Dept: FAMILY MEDICINE CLINIC | Facility: CLINIC | Age: 54
End: 2019-09-13
Payer: COMMERCIAL

## 2019-09-13 DIAGNOSIS — Z23 NEED FOR INFLUENZA VACCINATION: Primary | ICD-10-CM

## 2019-09-13 PROCEDURE — 90471 IMMUNIZATION ADMIN: CPT | Performed by: FAMILY MEDICINE

## 2019-09-13 PROCEDURE — 90682 RIV4 VACC RECOMBINANT DNA IM: CPT | Performed by: FAMILY MEDICINE

## 2020-01-07 DIAGNOSIS — E78.01 FAMILIAL HYPERCHOLESTEROLEMIA: ICD-10-CM

## 2020-01-07 RX ORDER — ATORVASTATIN CALCIUM 80 MG/1
80 TABLET, FILM COATED ORAL DAILY
Qty: 90 TABLET | Refills: 1 | Status: SHIPPED | OUTPATIENT
Start: 2020-01-07 | End: 2020-11-02

## 2020-02-26 ENCOUNTER — OFFICE VISIT (OUTPATIENT)
Dept: FAMILY MEDICINE CLINIC | Facility: CLINIC | Age: 55
End: 2020-02-26
Payer: COMMERCIAL

## 2020-02-26 VITALS
HEART RATE: 88 BPM | TEMPERATURE: 96.1 F | RESPIRATION RATE: 16 BRPM | DIASTOLIC BLOOD PRESSURE: 62 MMHG | HEIGHT: 74 IN | SYSTOLIC BLOOD PRESSURE: 114 MMHG | WEIGHT: 222 LBS | BODY MASS INDEX: 28.49 KG/M2

## 2020-02-26 DIAGNOSIS — N20.0 KIDNEY STONES: ICD-10-CM

## 2020-02-26 DIAGNOSIS — N28.1 COMPLEX RENAL CYST: ICD-10-CM

## 2020-02-26 DIAGNOSIS — Z12.5 SCREENING FOR PROSTATE CANCER: ICD-10-CM

## 2020-02-26 DIAGNOSIS — E78.2 MIXED HYPERLIPIDEMIA: ICD-10-CM

## 2020-02-26 DIAGNOSIS — Z00.00 WELL ADULT EXAM: Primary | ICD-10-CM

## 2020-02-26 PROCEDURE — 3008F BODY MASS INDEX DOCD: CPT | Performed by: FAMILY MEDICINE

## 2020-02-26 PROCEDURE — 99396 PREV VISIT EST AGE 40-64: CPT | Performed by: FAMILY MEDICINE

## 2020-02-26 NOTE — PROGRESS NOTES
Assessment/Plan:     Diagnoses and all orders for this visit:    Well adult exam    Mixed hyperlipidemia  -     Comprehensive metabolic panel  -     Lipid panel  -     CT coronary calcium score; Future    Kidney stones  -     CBC and differential    Complex renal cyst    Screening for prostate cancer  -     PSA, Total Screen; Future          Continue with current medications  Repeat labs  CT coronary artery Ca++ score  Re chronic LBP I discussed treatment options including PT, chiropractic, acupuncture, trigger point injections and/or pain management re evaluation  He has f/u OV with urology  BMI Counseling: Body mass index is 28 5 kg/m²  The BMI is above normal  Nutrition recommendations include reducing portion sizes, decreasing overall calorie intake, consuming healthier snacks, moderation in carbohydrate intake, reducing intake of saturated fat and trans fat and reducing intake of cholesterol  Exercise recommendations include exercising 3-5 times per week  Patient ID: Jenn Solares is a 62 y o  male  62 year male here for wellness exam   Medications reviewed  Last labs 01/2019 see note  Familial hyperlipidemia mixed type on Atorvastatin 80 mg daily  Prior treatment with Lopid and Fenofibrate  Intolerance to fish oils  Pre treatment cholesterol 400 range and triglyceride range 600  Last lipid profile cholesterol 292 increased from 245  Triglycerides 312 increased from 179  HDL 61    FBS 92  LFTs normal   Nonsmoker  No history of hypertension or type 2 diabetes mellitus  Family history CAD maternal grandmother  + FH hyperlipidemia parents  New job in manufacturing  Physically active         The following portions of the patient's history were reviewed and updated as appropriate: allergies, current medications, past family history, past medical history, past social history, past surgical history and problem list     Review of Systems   Constitutional: Negative for appetite change, chills, fever and unexpected weight change  HENT: Negative for congestion, ear pain, rhinorrhea, sinus pressure, sore throat and trouble swallowing  Eyes: Negative for visual disturbance  Respiratory: Negative for cough, shortness of breath and wheezing  Cardiovascular: Negative for chest pain, palpitations and leg swelling  Gastrointestinal: Negative for abdominal pain, blood in stool, constipation, diarrhea, nausea and vomiting  Status post cholecystectomy  01/2016 colonoscopy 2 polyps  Internal hemorrhoids    Endocrine: Negative for polydipsia and polyuria  Genitourinary: Negative for difficulty urinating  History of kidney stones followed by Urology  Prior surgery and lithotripsy  02/2017 CT scan of abdomen and pelvis bilateral renal cystic lesions  Stable small right hepatic lobe cysts 9 mm  3 mm calculus right mid pole kidney  No hydronephrosis  Left kidney minute calculus fragments visible mid to lower pole  01/2019 PSA 0 5  Status post left inguinal herniorrhaphy  Musculoskeletal: Positive for back pain  Negative for arthralgias and myalgias  Chronic right sided lower back  No radicular pain at present  Prior lumbar GERARDO for right lumbar radiculopathy  02/2017 CT scan abdomen and pelvis severe chronic disc degeneration present lumbosacral junction  Status post bilateral knee arthroscopy  Right shoulder surgery as a teenager  Skin: Negative for rash  Allergic/Immunologic: Negative for environmental allergies  Neurological: Negative for dizziness, weakness and headaches  Hematological: Negative for adenopathy  Does not bruise/bleed easily  Psychiatric/Behavioral: Negative for dysphoric mood and sleep disturbance           Objective:      /62   Pulse 88   Temp (!) 96 1 °F (35 6 °C)   Resp 16   Ht 6' 2" (1 88 m)   Wt 101 kg (222 lb)   BMI 28 50 kg/m²     Wt Readings from Last 3 Encounters:   02/26/20 101 kg (222 lb)   06/05/19 105 kg (230 lb 9 6 oz)   01/04/19 106 kg (234 lb)        Physical Exam   Constitutional: He is oriented to person, place, and time  He appears well-developed and well-nourished  No distress  HENT:   Right Ear: Tympanic membrane normal    Left Ear: Tympanic membrane normal    Mouth/Throat: Oropharynx is clear and moist    Eyes: Pupils are equal, round, and reactive to light  Conjunctivae and EOM are normal  No scleral icterus  Neck: No JVD present  Carotid bruit is not present  No tracheal deviation present  No thyroid mass and no thyromegaly present  Cardiovascular: Normal rate, regular rhythm and normal heart sounds  Exam reveals no gallop  No murmur heard  Pulses:       Carotid pulses are 2+ on the right side, and 2+ on the left side  Pulmonary/Chest: Effort normal and breath sounds normal  No respiratory distress  He has no wheezes  He has no rales  Abdominal: Soft  Bowel sounds are normal  He exhibits no distension, no abdominal bruit and no mass  There is no hepatosplenomegaly  There is no tenderness  There is no rebound and no guarding  Musculoskeletal: Normal range of motion  He exhibits tenderness  He exhibits no edema  Mild tenderness right lower lumbar paraspinal area and right SI area  Lymphadenopathy:     He has no cervical adenopathy  Neurological: He is alert and oriented to person, place, and time  He displays normal reflexes  No cranial nerve deficit  Skin: No rash noted  No cyanosis  Nails show no clubbing  Psychiatric: He has a normal mood and affect  Nursing note and vitals reviewed          Recent Results (from the past 71617 hour(s))   Comprehensive metabolic panel    Collection Time: 01/04/19  8:00 AM   Result Value Ref Range    Sodium 138 136 - 145 mmol/L    Potassium 4 6 3 5 - 5 3 mmol/L    Chloride 103 100 - 108 mmol/L    CO2 27 21 - 32 mmol/L    ANION GAP 8 4 - 13 mmol/L    BUN 22 5 - 25 mg/dL    Creatinine 0 94 0 60 - 1 30 mg/dL    Glucose, Fasting 92 65 - 99 mg/dL    Calcium 9 5 8 3 - 10 1 mg/dL    AST 25 5 - 45 U/L    ALT 57 12 - 78 U/L    Alkaline Phosphatase 90 46 - 116 U/L    Total Protein 7 5 6 4 - 8 2 g/dL    Albumin 4 1 3 5 - 5 0 g/dL    Total Bilirubin 0 61 0 20 - 1 00 mg/dL    eGFR 91 ml/min/1 73sq m   Lipid panel    Collection Time: 01/04/19  8:00 AM   Result Value Ref Range    Cholesterol 292 (H) 50 - 200 mg/dL    Triglycerides 312 (H) <=150 mg/dL    HDL, Direct 61 (H) 40 - 60 mg/dL    LDL Calculated 169 (H) 0 - 100 mg/dL    Non-HDL-Chol (CHOL-HDL) 231 mg/dl   CBC and differential    Collection Time: 01/04/19  8:00 AM   Result Value Ref Range    WBC 6 91 4 31 - 10 16 Thousand/uL    RBC 5 63 (H) 3 88 - 5 62 Million/uL    Hemoglobin 16 9 12 0 - 17 0 g/dL    Hematocrit 51 3 (H) 36 5 - 49 3 %    MCV 91 82 - 98 fL    MCH 30 0 26 8 - 34 3 pg    MCHC 32 9 31 4 - 37 4 g/dL    RDW 13 3 11 6 - 15 1 %    MPV 9 9 8 9 - 12 7 fL    Platelets 057 935 - 458 Thousands/uL    nRBC 0 /100 WBCs    Neutrophils Relative 61 43 - 75 %    Immat GRANS % 0 0 - 2 %    Lymphocytes Relative 29 14 - 44 %    Monocytes Relative 8 4 - 12 %    Eosinophils Relative 2 0 - 6 %    Basophils Relative 0 0 - 1 %    Neutrophils Absolute 4 20 1 85 - 7 62 Thousands/µL    Immature Grans Absolute 0 02 0 00 - 0 20 Thousand/uL    Lymphocytes Absolute 2 03 0 60 - 4 47 Thousands/µL    Monocytes Absolute 0 52 0 17 - 1 22 Thousand/µL    Eosinophils Absolute 0 11 0 00 - 0 61 Thousand/µL    Basophils Absolute 0 03 0 00 - 0 10 Thousands/µL   PSA, Total Screen    Collection Time: 01/04/19  8:00 AM   Result Value Ref Range    PSA 0 5 0 0 - 4 0 ng/mL

## 2020-05-29 ENCOUNTER — TELEPHONE (OUTPATIENT)
Dept: UROLOGY | Facility: MEDICAL CENTER | Age: 55
End: 2020-05-29

## 2020-06-21 ENCOUNTER — HOSPITAL ENCOUNTER (OUTPATIENT)
Dept: CT IMAGING | Facility: HOSPITAL | Age: 55
Discharge: HOME | End: 2020-06-21
Attending: FAMILY MEDICINE
Payer: COMMERCIAL

## 2020-06-21 ENCOUNTER — HOSPITAL ENCOUNTER (OUTPATIENT)
Dept: RADIOLOGY | Facility: HOSPITAL | Age: 55
Discharge: HOME | End: 2020-06-21
Attending: UROLOGY
Payer: COMMERCIAL

## 2020-06-21 DIAGNOSIS — E78.2 MIXED HYPERLIPIDEMIA: ICD-10-CM

## 2020-06-21 DIAGNOSIS — N20.0 KIDNEY STONES: ICD-10-CM

## 2020-06-21 PROCEDURE — 74018 RADEX ABDOMEN 1 VIEW: CPT

## 2020-06-21 PROCEDURE — 75571 CT HRT W/O DYE W/CA TEST: CPT

## 2020-06-25 ENCOUNTER — TELEPHONE (OUTPATIENT)
Dept: UROLOGY | Facility: AMBULATORY SURGERY CENTER | Age: 55
End: 2020-06-25

## 2020-07-11 PROBLEM — I25.10 CORONARY ARTERY CALCIFICATION: Status: ACTIVE | Noted: 2020-07-11

## 2020-07-11 PROBLEM — I25.84 CORONARY ARTERY CALCIFICATION: Status: ACTIVE | Noted: 2020-07-11

## 2020-09-08 ENCOUNTER — IMMUNIZATIONS (OUTPATIENT)
Dept: FAMILY MEDICINE CLINIC | Facility: CLINIC | Age: 55
End: 2020-09-08
Payer: COMMERCIAL

## 2020-09-08 DIAGNOSIS — Z23 NEED FOR VACCINATION: Primary | ICD-10-CM

## 2020-09-08 PROCEDURE — 90471 IMMUNIZATION ADMIN: CPT

## 2020-09-08 PROCEDURE — 96372 THER/PROPH/DIAG INJ SC/IM: CPT

## 2020-09-08 PROCEDURE — 99211 OFF/OP EST MAY X REQ PHY/QHP: CPT

## 2020-09-08 PROCEDURE — 90682 RIV4 VACC RECOMBINANT DNA IM: CPT

## 2020-09-23 ENCOUNTER — TELEPHONE (OUTPATIENT)
Dept: UROLOGY | Facility: MEDICAL CENTER | Age: 55
End: 2020-09-23

## 2020-09-23 NOTE — TELEPHONE ENCOUNTER
This is a patient of Dr Albania Caballero in [place]  Patient called and said his appointment was rescheduled and he is with Aishwarya Olivares  He said he was told he should only see Dr Albania Caballero   Please call patient back at [phone]

## 2020-09-23 NOTE — TELEPHONE ENCOUNTER
Patient of [facility] [facility]  History of nephrolithiasis and prostate cancer screening  Patient was last seen June 2019 and recommended to follow up in one year  Call placed to patient, advised that it is acceptable to follow up with advanced practitioner for these particular issues  Patient verbalized understanding and is agreeable to keep his appointment as scheduled with [facility]

## 2020-10-20 ENCOUNTER — OFFICE VISIT (OUTPATIENT)
Dept: UROLOGY | Facility: AMBULATORY SURGERY CENTER | Age: 55
End: 2020-10-20
Payer: COMMERCIAL

## 2020-10-20 VITALS
TEMPERATURE: 97.3 F | WEIGHT: 227.2 LBS | HEART RATE: 74 BPM | DIASTOLIC BLOOD PRESSURE: 68 MMHG | BODY MASS INDEX: 29.16 KG/M2 | HEIGHT: 74 IN | SYSTOLIC BLOOD PRESSURE: 132 MMHG

## 2020-10-20 DIAGNOSIS — Z12.5 SCREENING FOR PROSTATE CANCER: ICD-10-CM

## 2020-10-20 DIAGNOSIS — N20.0 KIDNEY STONES: Primary | ICD-10-CM

## 2020-10-20 LAB
BACTERIA UR QL AUTO: NORMAL /HPF
BILIRUB UR QL STRIP: NEGATIVE
CLARITY UR: CLEAR
COLOR UR: YELLOW
GLUCOSE UR STRIP-MCNC: NEGATIVE MG/DL
HGB UR QL STRIP.AUTO: NEGATIVE
HYALINE CASTS #/AREA URNS LPF: NORMAL /LPF
KETONES UR STRIP-MCNC: NEGATIVE MG/DL
LEUKOCYTE ESTERASE UR QL STRIP: NEGATIVE
NITRITE UR QL STRIP: NEGATIVE
NON-SQ EPI CELLS URNS QL MICRO: NORMAL /HPF
PH UR STRIP.AUTO: 7 [PH]
PROT UR STRIP-MCNC: NEGATIVE MG/DL
RBC #/AREA URNS AUTO: NORMAL /HPF
SL AMB  POCT GLUCOSE, UA: ABNORMAL
SL AMB LEUKOCYTE ESTERASE,UA: ABNORMAL
SL AMB POCT BILIRUBIN,UA: ABNORMAL
SL AMB POCT BLOOD,UA: ABNORMAL
SL AMB POCT CLARITY,UA: CLEAR
SL AMB POCT COLOR,UA: YELLOW
SL AMB POCT KETONES,UA: ABNORMAL
SL AMB POCT NITRITE,UA: ABNORMAL
SL AMB POCT PH,UA: 7
SL AMB POCT SPECIFIC GRAVITY,UA: 1.01
SL AMB POCT URINE PROTEIN: ABNORMAL
SL AMB POCT UROBILINOGEN: ABNORMAL
SP GR UR STRIP.AUTO: 1.02 (ref 1–1.03)
UROBILINOGEN UR QL STRIP.AUTO: 0.2 E.U./DL
WBC #/AREA URNS AUTO: NORMAL /HPF

## 2020-10-20 PROCEDURE — 87086 URINE CULTURE/COLONY COUNT: CPT | Performed by: NURSE PRACTITIONER

## 2020-10-20 PROCEDURE — 81002 URINALYSIS NONAUTO W/O SCOPE: CPT | Performed by: NURSE PRACTITIONER

## 2020-10-20 PROCEDURE — 99214 OFFICE O/P EST MOD 30 MIN: CPT | Performed by: NURSE PRACTITIONER

## 2020-10-20 PROCEDURE — 1036F TOBACCO NON-USER: CPT | Performed by: NURSE PRACTITIONER

## 2020-10-20 PROCEDURE — 81001 URINALYSIS AUTO W/SCOPE: CPT | Performed by: NURSE PRACTITIONER

## 2020-10-20 RX ORDER — CHLORAL HYDRATE 500 MG
1000 CAPSULE ORAL DAILY
COMMUNITY

## 2020-10-21 LAB — BACTERIA UR CULT: NORMAL

## 2020-10-28 ENCOUNTER — HOSPITAL ENCOUNTER (OUTPATIENT)
Dept: RADIOLOGY | Age: 55
Discharge: HOME | End: 2020-10-28
Payer: COMMERCIAL

## 2020-10-28 DIAGNOSIS — N20.0 KIDNEY STONES: ICD-10-CM

## 2020-10-28 PROCEDURE — G1004 CDSM NDSC: HCPCS

## 2020-10-28 PROCEDURE — 74176 CT ABD & PELVIS W/O CONTRAST: CPT

## 2020-11-01 DIAGNOSIS — E78.01 FAMILIAL HYPERCHOLESTEROLEMIA: ICD-10-CM

## 2020-11-02 ENCOUNTER — TELEPHONE (OUTPATIENT)
Dept: UROLOGY | Facility: AMBULATORY SURGERY CENTER | Age: 55
End: 2020-11-02

## 2020-11-02 DIAGNOSIS — N20.0 KIDNEY STONES: Primary | ICD-10-CM

## 2020-11-02 RX ORDER — ATORVASTATIN CALCIUM 80 MG/1
TABLET, FILM COATED ORAL
Qty: 90 TABLET | Refills: 1 | Status: SHIPPED | OUTPATIENT
Start: 2020-11-02 | End: 2021-05-09

## 2021-01-14 ENCOUNTER — TELEPHONE (OUTPATIENT)
Dept: UROLOGY | Facility: AMBULATORY SURGERY CENTER | Age: 56
End: 2021-01-14

## 2021-01-19 NOTE — TELEPHONE ENCOUNTER
Patient of [facility]  History of kidney/bladder stones, prostate cancer screening  Patient was last seen in office October 2020  He was ordered CT stone study which only showed non obstructing stones  Patient is not scheduled for any surgery/procedures  Call placed to patient to discuss FMLA paperwork  He states that he wanted it filled out to "protect his job" if he needs to call out for back pain  Advised patient that FMLA paperwork is not for intermittent leave for symptoms such as his  Patient states that he also has a "disc issue with his back"  Advised if patient is missing work due to back pain, perhaps his PCP would complete FMLA papers

## 2021-02-28 NOTE — PROGRESS NOTES
Assessment/Plan:     Diagnoses and all orders for this visit:    Well adult exam    Mixed hyperlipidemia  -     Comprehensive metabolic panel  -     Lipid panel    Kidney stones  -     CBC and differential    Medial epicondylitis of both elbows    Trochanteric bursitis of right hip    Screening for prostate cancer  -     PSA, Total Screen; Future    Need for hepatitis C screening test  -     Hepatitis C antibody; Future    Other orders  -     Ascorbic Acid (Vitamin C) 500 MG CHEW; Chew 1 Dose daily  -     Cholecalciferol (D3-1000) 25 MCG (1000 UT) capsule; Take 2 capsules by mouth daily  -     Coconut Oil 1000 MG CAPS; Take 1 capsule by mouth daily  -     Loratadine 10 MG CAPS; Take 1 capsule by mouth daily  -     Magnesium 500 MG TABS; Take 1 tablet by mouth daily  -     Turmeric (QC Tumeric Complex) 500 MG CAPS; Take 1 capsule by mouth daily  -     Zinc 50 MG TABS; Take 1 tablet by mouth daily             Continue with current medications  Repeat labs  Patient is due for colonoscopy  Regarding bilateral medial epicondylitis  Continue with NSAIDs  Ice after activity, forearm brace at work  Consider PT and/or steroid injections  Suspected right trochanteric bursa consider steroid injection and/or PT advised to call if any changes  His current job is scheduled to end in July 2021  BMI Counseling: Body mass index is 28 89 kg/m²  The BMI is above normal  Nutrition recommendations include reducing portion sizes, decreasing overall calorie intake, consuming healthier snacks, moderation in carbohydrate intake, reducing intake of saturated fat and trans fat and reducing intake of cholesterol  Exercise recommendations include exercising 3-5 times per week  Patient ID: Gill Rowan is a 60 y o  male  60 year male here for wellness exam   Medications reviewed   Hospitalizations/surgeries right shoulder arthroscopy, bilateral knee arthroscopy, kidney stone surgery and lithotripsy inguinal hernia repair, Cholecystectomy  Family history hyperlipidemia parents  Hypertension mother  Nephrolithiasis mother, brother  CAD maternal grandmother  Social history nonsmoker  No recent labs 01/2019    Familial hyperlipidemia mixed type on Atorvastatin 80 mg daily and 1 fish oil capsule/day  Prior treatment with Lopid and Fenofibrate  Pre treatment cholesterol 400 range and triglyceride range 600  Last lipid profile cholesterol 01/2019 292 increased from 245  Triglycerides 312 increased from 179  HDL 61    FBS 92  LFTs normal   Nonsmoker  No history of hypertension or type 2 diabetes mellitus  The following portions of the patient's history were reviewed and updated as appropriate: allergies, current medications, past family history, past medical history, past social history, past surgical history and problem list     Review of Systems   Constitutional: Negative for appetite change, chills, fever and unexpected weight change  HENT: Negative for congestion, ear pain, rhinorrhea, sinus pressure, sore throat and trouble swallowing  Eyes: Negative for visual disturbance  Respiratory: Negative for cough, shortness of breath and wheezing  Cardiovascular: Negative for chest pain, palpitations and leg swelling  Gastrointestinal: Negative for abdominal pain, blood in stool, constipation, diarrhea, nausea and vomiting  Status post cholecystectomy  01/2016 colonoscopy 2 polyps  Internal hemorrhoids    Endocrine: Negative for polydipsia and polyuria  Genitourinary: Negative for difficulty urinating  History of kidney stones followed by Urology  Prior surgery and lithotripsy  02/2017 CT scan of abdomen and pelvis bilateral renal cystic lesions  Stable small right hepatic lobe cysts 9 mm  3 mm calculus right mid pole kidney  No hydronephrosis  Left kidney minute calculus fragments visible mid to lower pole  Status post left inguinal herniorrhaphy      Lab Results       Component Value               Date                       PSA                      0 5                 01/04/2019                 PSA                      0 3                 08/17/2017                 PSA                      0 5                 01/23/2017                 Musculoskeletal: Positive for arthralgias and back pain  Negative for myalgias  Several month history bilateral medial elbow pain as well as right lateral hip pain  New job at work requiring repetitive activities lifting, bending  He is using 2 Aleve daily before work  Chronic right sided lower back  No radicular pain at present  Prior lumbar GERARDO for right lumbar radiculopathy  02/2017 CT scan abdomen and pelvis severe chronic disc degeneration present lumbosacral junction  Status post bilateral knee arthroscopy  Right shoulder surgery as a teenager  Skin: Negative for rash  Allergic/Immunologic: Negative for environmental allergies  Neurological: Negative for dizziness, weakness and headaches  Hematological: Negative for adenopathy  Does not bruise/bleed easily  Psychiatric/Behavioral: Negative for dysphoric mood and sleep disturbance  Objective:    /64   Pulse 72   Temp (!) 72 °F (22 2 °C)   Resp 16   Ht 6' 2" (1 88 m)   Wt 102 kg (225 lb)   BMI 28 89 kg/m²     BP Readings from Last 3 Encounters:   03/01/21 110/64   10/20/20 132/68   02/26/20 114/62     Wt Readings from Last 3 Encounters:   03/01/21 102 kg (225 lb)   10/20/20 103 kg (227 lb 3 2 oz)   02/26/20 101 kg (222 lb)        Physical Exam  Vitals signs and nursing note reviewed  Constitutional:       General: He is not in acute distress  Appearance: He is well-developed  HENT:      Right Ear: Tympanic membrane normal       Left Ear: Tympanic membrane normal    Eyes:      General: No scleral icterus  Extraocular Movements: Extraocular movements intact        Conjunctiva/sclera: Conjunctivae normal       Pupils: Pupils are equal, round, and reactive to light  Neck:      Thyroid: No thyroid mass or thyromegaly  Vascular: No carotid bruit or JVD  Trachea: No tracheal deviation  Cardiovascular:      Rate and Rhythm: Normal rate and regular rhythm  Pulses:           Carotid pulses are 2+ on the right side and 2+ on the left side  Heart sounds: Normal heart sounds  No murmur  No gallop  Pulmonary:      Effort: Pulmonary effort is normal  No respiratory distress  Breath sounds: Normal breath sounds  No wheezing or rales  Abdominal:      General: Bowel sounds are normal  There is no distension or abdominal bruit  Palpations: Abdomen is soft  There is no hepatomegaly, splenomegaly or mass  Tenderness: There is no abdominal tenderness  There is no guarding or rebound  Musculoskeletal: Normal range of motion  General: Tenderness present  No swelling  Right lower leg: No edema  Left lower leg: No edema  Comments: Full range of motion of both elbows  Mild tenderness over medial epicondyle areas bilaterally  No swelling  No redness or warmth  Mild tenderness over right trochanteric bursa area  Full range of motion of both hips  Negative straight leg raise test bilaterally  Lymphadenopathy:      Cervical: No cervical adenopathy  Upper Body:      Right upper body: No supraclavicular adenopathy  Left upper body: No supraclavicular adenopathy  Skin:     Findings: No rash  Nails: There is no clubbing  Neurological:      General: No focal deficit present  Mental Status: He is alert and oriented to person, place, and time     Psychiatric:         Mood and Affect: Mood normal          Behavior: Behavior normal

## 2021-03-01 ENCOUNTER — OFFICE VISIT (OUTPATIENT)
Dept: FAMILY MEDICINE CLINIC | Facility: CLINIC | Age: 56
End: 2021-03-01
Payer: COMMERCIAL

## 2021-03-01 VITALS
HEART RATE: 72 BPM | TEMPERATURE: 72 F | WEIGHT: 225 LBS | BODY MASS INDEX: 28.88 KG/M2 | DIASTOLIC BLOOD PRESSURE: 64 MMHG | HEIGHT: 74 IN | SYSTOLIC BLOOD PRESSURE: 110 MMHG | RESPIRATION RATE: 16 BRPM

## 2021-03-01 DIAGNOSIS — Z12.5 SCREENING FOR PROSTATE CANCER: ICD-10-CM

## 2021-03-01 DIAGNOSIS — Z11.59 NEED FOR HEPATITIS C SCREENING TEST: ICD-10-CM

## 2021-03-01 DIAGNOSIS — E78.2 MIXED HYPERLIPIDEMIA: ICD-10-CM

## 2021-03-01 DIAGNOSIS — M77.01 MEDIAL EPICONDYLITIS OF BOTH ELBOWS: ICD-10-CM

## 2021-03-01 DIAGNOSIS — N20.0 KIDNEY STONES: ICD-10-CM

## 2021-03-01 DIAGNOSIS — Z00.00 WELL ADULT EXAM: Primary | ICD-10-CM

## 2021-03-01 DIAGNOSIS — M70.61 TROCHANTERIC BURSITIS OF RIGHT HIP: ICD-10-CM

## 2021-03-01 DIAGNOSIS — M77.02 MEDIAL EPICONDYLITIS OF BOTH ELBOWS: ICD-10-CM

## 2021-03-01 PROCEDURE — 3725F SCREEN DEPRESSION PERFORMED: CPT | Performed by: FAMILY MEDICINE

## 2021-03-01 PROCEDURE — 99396 PREV VISIT EST AGE 40-64: CPT | Performed by: FAMILY MEDICINE

## 2021-03-01 RX ORDER — PROPRANOLOL/HYDROCHLOROTHIAZID 40 MG-25MG
1 TABLET ORAL DAILY
COMMUNITY
Start: 2020-07-01 | End: 2022-03-22 | Stop reason: ALTCHOICE

## 2021-03-01 RX ORDER — THIAMINE HCL 100 MG
1 TABLET ORAL DAILY
COMMUNITY
Start: 2020-07-01

## 2021-03-01 RX ORDER — ZINC GLUCONATE 50 MG
1 TABLET ORAL DAILY
COMMUNITY
Start: 2020-05-01

## 2021-03-01 RX ORDER — PERPHENAZINE/AMITRIPTYLINE HCL 4MG-10MG
1 TABLET ORAL DAILY
COMMUNITY
Start: 2020-07-01 | End: 2022-03-22 | Stop reason: ALTCHOICE

## 2021-03-01 RX ORDER — LORATADINE 10 MG/1
1 CAPSULE, LIQUID FILLED ORAL DAILY
COMMUNITY
Start: 2018-03-31

## 2021-03-01 RX ORDER — UBIDECARENONE 100 MG
2 CAPSULE ORAL DAILY
COMMUNITY
Start: 2020-04-01

## 2021-03-11 ENCOUNTER — APPOINTMENT (OUTPATIENT)
Dept: RADIOLOGY | Facility: MEDICAL CENTER | Age: 56
End: 2021-03-11
Payer: COMMERCIAL

## 2021-03-11 ENCOUNTER — OFFICE VISIT (OUTPATIENT)
Dept: OBGYN CLINIC | Facility: MEDICAL CENTER | Age: 56
End: 2021-03-11
Payer: COMMERCIAL

## 2021-03-11 VITALS
DIASTOLIC BLOOD PRESSURE: 88 MMHG | BODY MASS INDEX: 28.88 KG/M2 | SYSTOLIC BLOOD PRESSURE: 125 MMHG | WEIGHT: 225 LBS | HEART RATE: 79 BPM | HEIGHT: 74 IN

## 2021-03-11 DIAGNOSIS — M25.551 PAIN IN RIGHT HIP: Primary | ICD-10-CM

## 2021-03-11 DIAGNOSIS — M25.551 PAIN IN RIGHT HIP: ICD-10-CM

## 2021-03-11 DIAGNOSIS — G89.29 CHRONIC RIGHT-SIDED LOW BACK PAIN WITHOUT SCIATICA: ICD-10-CM

## 2021-03-11 DIAGNOSIS — M54.50 CHRONIC RIGHT-SIDED LOW BACK PAIN WITHOUT SCIATICA: ICD-10-CM

## 2021-03-11 DIAGNOSIS — M25.551 GREATER TROCHANTERIC PAIN SYNDROME OF RIGHT LOWER EXTREMITY: ICD-10-CM

## 2021-03-11 PROCEDURE — 73502 X-RAY EXAM HIP UNI 2-3 VIEWS: CPT

## 2021-03-11 PROCEDURE — 3008F BODY MASS INDEX DOCD: CPT | Performed by: PHYSICAL MEDICINE & REHABILITATION

## 2021-03-11 PROCEDURE — 20610 DRAIN/INJ JOINT/BURSA W/O US: CPT | Performed by: PHYSICAL MEDICINE & REHABILITATION

## 2021-03-11 PROCEDURE — 99204 OFFICE O/P NEW MOD 45 MIN: CPT | Performed by: PHYSICAL MEDICINE & REHABILITATION

## 2021-03-11 PROCEDURE — 1036F TOBACCO NON-USER: CPT | Performed by: PHYSICAL MEDICINE & REHABILITATION

## 2021-03-11 RX ORDER — MELOXICAM 15 MG/1
15 TABLET ORAL DAILY PRN
Qty: 60 TABLET | Refills: 0 | Status: SHIPPED | OUTPATIENT
Start: 2021-03-11 | End: 2021-05-03

## 2021-03-11 RX ORDER — LIDOCAINE HYDROCHLORIDE 10 MG/ML
3 INJECTION, SOLUTION INFILTRATION; PERINEURAL
Status: COMPLETED | OUTPATIENT
Start: 2021-03-11 | End: 2021-03-11

## 2021-03-11 RX ORDER — TRIAMCINOLONE ACETONIDE 40 MG/ML
80 INJECTION, SUSPENSION INTRA-ARTICULAR; INTRAMUSCULAR
Status: COMPLETED | OUTPATIENT
Start: 2021-03-11 | End: 2021-03-11

## 2021-03-11 RX ADMIN — TRIAMCINOLONE ACETONIDE 80 MG: 40 INJECTION, SUSPENSION INTRA-ARTICULAR; INTRAMUSCULAR at 09:59

## 2021-03-11 RX ADMIN — LIDOCAINE HYDROCHLORIDE 3 ML: 10 INJECTION, SOLUTION INFILTRATION; PERINEURAL at 09:59

## 2021-03-11 NOTE — PATIENT INSTRUCTIONS
You had a cortisone (steroid) injection  There are two medicines in this injection, a numbing medicine and a steroid  The numbing medication component usually takes effect within a few minutes and wears off after a few hours, after which your pain may return  The steroid medication typically takes effect in 3-5 days, although some people have benefits sooner, and lasts for a much longer time  You will be starting physical therapy  It is important to do home exercises as given by your physical therapist as you go through PT to speed up your recovery  Physical therapy addresses the problems that are causing your pain, instead of covering them up, as some other treatment options do  We will see you back after completing physical therapy

## 2021-03-11 NOTE — PROGRESS NOTES
1  Pain in right hip  XR hip/pelv 2-3 vws right if performed    Ambulatory referral to Physical Therapy    meloxicam (MOBIC) 15 mg tablet   2  Chronic right-sided low back pain without sciatica  Ambulatory referral to Physical Therapy    meloxicam (MOBIC) 15 mg tablet   3  Greater trochanteric pain syndrome of right lower extremity  Large joint arthrocentesis: R greater trochanteric bursa    Ambulatory referral to Physical Therapy    meloxicam (MOBIC) 15 mg tablet     Orders Placed This Encounter   Procedures    Large joint arthrocentesis: R greater trochanteric bursa    XR hip/pelv 2-3 vws right if performed    Ambulatory referral to Physical Therapy        Impression:  The patient's pain is multifactorial and is predominantly due to right greater trochanteric pain syndrome, myofascial spasticity/strain and postural/core instability  There are no concerning neurological deficits  We discussed different treatment options and decided to proceed with right GT steroid injection and meloxicam with Tylenol  We discussed the risk of NSAID use including internal bleeding, increased blood pressure, increased risk of heart attack/stroke and worsening kidney function  Tylenol (acetaminophen) can be used with one of the NSAIDs or by itself, as long as no new liver problems and not drinking alcohol  The patient should start physical therapy as soon as possible  I will see them back after 6 weeks of physical therapy or earlier if symptoms worsen/change  Can consider lumbar spine x-rays on follow up if still having symptoms  Return in about 6 weeks (around 4/22/2021)  or earlier if symptoms worsen/change  HPI:  Brielle Goodrich is a 60 y o  male  who presents for evaluation of   Chief Complaint   Patient presents with    Spine - Pain    Right Hip - Pain       Onset/Mechanism: Pain for the past few months without an inj    Location: Right side of the low back and into the lateral hip area   Radiation: Down the thigh but not past the knee  Quality: Pinching  Provocative: Lying on his side  Severity: 9/10 at its worst and currently a 5/10  Associated Symptoms: Trouble with certain movements  Treatment so far: OTC medications  Review of Systems   Constitutional: Positive for activity change  Negative for fever  HENT: Negative for trouble swallowing  Eyes: Negative for visual disturbance  Respiratory: Negative for shortness of breath  Cardiovascular: Negative for chest pain  Gastrointestinal: Negative for abdominal pain  Denies bowel incontinence  Endocrine: Negative for polydipsia  Genitourinary:        Denies urinary incontinence  Musculoskeletal: Positive for back pain  Negative for gait problem  Skin: Negative for rash  Allergic/Immunologic: Negative for immunocompromised state  Neurological: Negative for weakness and numbness  Denies saddle anesthesia  Hematological: Does not bruise/bleed easily  Psychiatric/Behavioral: Negative for confusion  No red flag symptoms including fever/chills, unintentional weight change, bowel/bladder incontinence, scissoring gait, personal/family history of cancer, pain worse at night, intravenous drug abuse or trauma  Following history reviewed and updated:  Past Medical History:   Diagnosis Date    Acute sinusitis     Allergic rhinitis     Chronic sinusitis     last assessed 10/23/2012    Hyperlipemia     Kidney stone 5/2001     Past Surgical History:   Procedure Laterality Date    CHOLECYSTECTOMY      COLONOSCOPY N/A 1/22/2016    Procedure: COLONOSCOPY;  Surgeon: Elli Tan MD;  Location: [facility];   Service:    [address] [place] [address]      KIDNEY STONE SURGERY      In your records    LITHOTRIPSY      In your records    ORTHOPEDIC SURGERY Right 1997    Knee    SHOULDER ARTHROSCOPY Right      Social History   Social History     Substance and Sexual Activity   Alcohol Use Yes    Types: 6 Cans of beer per week    Frequency: 2-4 times a month    Comment: social     Social History     Substance and Sexual Activity   Drug Use Never     Social History     Tobacco Use   Smoking Status Never Smoker   Smokeless Tobacco Never Used     Family History   Problem Relation Age of Onset    Nephrolithiasis Mother     Hyperlipidemia Mother     Hypertension Mother     Urolithiasis Mother     Hyperlipidemia Father     Nephrolithiasis Brother      No Known Allergies     Constitutional:  /88 (BP Location: Right arm, Patient Position: Sitting, Cuff Size: Standard)   Pulse 79   Ht 6' 2" (1 88 m)   Wt 102 kg (225 lb)   BMI 28 89 kg/m²    General: NAD  Eyes: Anicteric sclerae  Neck: Supple  Lungs: Unlabored breathing  Cardiovascular: No lower extremity edema  Skin: Intact without erythema  Neurologic: Sensation intact to light touch  Psychiatric: Mood and affect are appropriate  Orthopedic Examination   Inspection: No obvious deformities, lesions or rashes   ROM: Within normal limits  Hip internal rotation slightly reduced   Palpation: TTP and hypertonicity along the right lumbar paraspinals  TTP along the right greater trochanter  There are no step offs   Neuro: Bilateral extremity strength is normal and symmetric  No muscle atrophy or abnormal tone  Bilateral extremity muscle stretch reflexes are physiologic and symmetric   No myelopathic signs  Sensation to light touch is intact throughout  Neural compression testing: Normal bilateral SLR/dural stretch  Normal Eldridge's maneuver  Gait is normal   Normal log roll maneuver  Injection site is CDI      Large joint arthrocentesis: R greater trochanteric bursa  Universal Protocol:  Consent given by: patient  Timeout called at: 3/11/2021 9:57 AM   Supporting Documentation  Indications: pain   Procedure Details  Location: hip - R greater trochanteric bursa  Needle size: 20 G (20G 3 5'')  Ultrasound guidance: no  Approach: Lateral to medial   Medications administered: 3 mL lidocaine 1 %; 80 mg triamcinolone acetonide 40 mg/mL    Patient tolerance: patient tolerated the procedure well with no immediate complications  Dressing:  Sterile dressing applied    Prior to the procedure, the patient was informed of the following risks in layman terms:    - Risk of bleeding since a needle is involved  - Risk of infection (1/10,000 chance as per recent studies)  Signs/symptoms were discussed and they would prompt an urgent evaluation at an emergency department   - Risk of pigmentation or skin dimpling in the skin (2-3% chance as per recent studies) from the steroid  - Risk of increased pain from steroid flare (1% chance as per recent studies) that typically lasts 24-48 hours  - Risk of increased blood sugars from the steroid medication that can last for a few weeks  If the patient is a diabetic or pre-diabetic, they were encouraged to closely monitor their blood sugars and discuss with PCP if elevated more than usual or if having symptoms  After going over these risks, we decided that the benefits outweigh the risks and proceeded with the procedure  Imaging Studies (I personally reviewed images in PACS and report if it was available):  Right hip x-rays that are most recent to this encounter were reviewed  These images show   A slightly irregular shape of the femoral head consistent with a cam deformity  There is a radiodensity at the greater trochanter which could represent calcification  There is a slight curvature of the lumbar spine and degenerative changes of the lower lumbar spine  No acute osseous abnormalities

## 2021-03-13 ENCOUNTER — APPOINTMENT (OUTPATIENT)
Dept: LAB | Age: 56
End: 2021-03-13
Payer: COMMERCIAL

## 2021-03-13 DIAGNOSIS — Z11.59 NEED FOR HEPATITIS C SCREENING TEST: ICD-10-CM

## 2021-03-13 DIAGNOSIS — Z12.5 SCREENING FOR PROSTATE CANCER: ICD-10-CM

## 2021-03-13 LAB
ALBUMIN SERPL BCP-MCNC: 4.4 G/DL (ref 3.5–5)
ALP SERPL-CCNC: 82 U/L (ref 46–116)
ALT SERPL W P-5'-P-CCNC: 58 U/L (ref 12–78)
ANION GAP SERPL CALCULATED.3IONS-SCNC: 4 MMOL/L (ref 4–13)
AST SERPL W P-5'-P-CCNC: 17 U/L (ref 5–45)
BASOPHILS # BLD AUTO: 0.03 THOUSANDS/ΜL (ref 0–0.1)
BASOPHILS NFR BLD AUTO: 0 % (ref 0–1)
BILIRUB SERPL-MCNC: 0.94 MG/DL (ref 0.2–1)
BUN SERPL-MCNC: 29 MG/DL (ref 5–25)
CALCIUM SERPL-MCNC: 9.5 MG/DL (ref 8.3–10.1)
CHLORIDE SERPL-SCNC: 107 MMOL/L (ref 100–108)
CHOLEST SERPL-MCNC: 242 MG/DL (ref 50–200)
CO2 SERPL-SCNC: 29 MMOL/L (ref 21–32)
CREAT SERPL-MCNC: 0.87 MG/DL (ref 0.6–1.3)
EOSINOPHIL # BLD AUTO: 0.07 THOUSAND/ΜL (ref 0–0.61)
EOSINOPHIL NFR BLD AUTO: 1 % (ref 0–6)
ERYTHROCYTE [DISTWIDTH] IN BLOOD BY AUTOMATED COUNT: 12.6 % (ref 11.6–15.1)
GFR SERPL CREATININE-BSD FRML MDRD: 96 ML/MIN/1.73SQ M
GLUCOSE P FAST SERPL-MCNC: 99 MG/DL (ref 65–99)
HCT VFR BLD AUTO: 47.9 % (ref 36.5–49.3)
HCV AB SER QL: NORMAL
HDLC SERPL-MCNC: 72 MG/DL
HGB BLD-MCNC: 15.9 G/DL (ref 12–17)
IMM GRANULOCYTES # BLD AUTO: 0.03 THOUSAND/UL (ref 0–0.2)
IMM GRANULOCYTES NFR BLD AUTO: 0 % (ref 0–2)
LDLC SERPL CALC-MCNC: 138 MG/DL (ref 0–100)
LYMPHOCYTES # BLD AUTO: 1.77 THOUSANDS/ΜL (ref 0.6–4.47)
LYMPHOCYTES NFR BLD AUTO: 22 % (ref 14–44)
MCH RBC QN AUTO: 30.2 PG (ref 26.8–34.3)
MCHC RBC AUTO-ENTMCNC: 33.2 G/DL (ref 31.4–37.4)
MCV RBC AUTO: 91 FL (ref 82–98)
MONOCYTES # BLD AUTO: 0.61 THOUSAND/ΜL (ref 0.17–1.22)
MONOCYTES NFR BLD AUTO: 7 % (ref 4–12)
NEUTROPHILS # BLD AUTO: 5.7 THOUSANDS/ΜL (ref 1.85–7.62)
NEUTS SEG NFR BLD AUTO: 70 % (ref 43–75)
NONHDLC SERPL-MCNC: 170 MG/DL
NRBC BLD AUTO-RTO: 0 /100 WBCS
PLATELET # BLD AUTO: 196 THOUSANDS/UL (ref 149–390)
PMV BLD AUTO: 9.8 FL (ref 8.9–12.7)
POTASSIUM SERPL-SCNC: 4.5 MMOL/L (ref 3.5–5.3)
PROT SERPL-MCNC: 7.7 G/DL (ref 6.4–8.2)
PSA SERPL-MCNC: 0.4 NG/ML (ref 0–4)
RBC # BLD AUTO: 5.27 MILLION/UL (ref 3.88–5.62)
SODIUM SERPL-SCNC: 140 MMOL/L (ref 136–145)
TRIGL SERPL-MCNC: 161 MG/DL
WBC # BLD AUTO: 8.21 THOUSAND/UL (ref 4.31–10.16)

## 2021-03-13 PROCEDURE — 80053 COMPREHEN METABOLIC PANEL: CPT | Performed by: FAMILY MEDICINE

## 2021-03-13 PROCEDURE — 85025 COMPLETE CBC W/AUTO DIFF WBC: CPT | Performed by: FAMILY MEDICINE

## 2021-03-13 PROCEDURE — 80061 LIPID PANEL: CPT | Performed by: FAMILY MEDICINE

## 2021-03-13 PROCEDURE — 86803 HEPATITIS C AB TEST: CPT

## 2021-03-13 PROCEDURE — G0103 PSA SCREENING: HCPCS

## 2021-03-13 PROCEDURE — 36415 COLL VENOUS BLD VENIPUNCTURE: CPT | Performed by: FAMILY MEDICINE

## 2021-03-14 NOTE — RESULT ENCOUNTER NOTE
Destin Sanchez I reviewed your labs  Your cholesterol improved from 292 to 242 < 200 normal  Your triglycerides improved from 312 to 161 < 150 normal  You can increase your fish oils to 2 to 4 capsules a day to help lower your triglycerides  Your BUN or kidney function is slightly elevated  This can be affected by hydration  Try to stay hydrated    Your creatinine-another measure of kidney function is normal

## 2021-03-17 ENCOUNTER — EVALUATION (OUTPATIENT)
Dept: PHYSICAL THERAPY | Age: 56
End: 2021-03-17
Payer: COMMERCIAL

## 2021-03-17 DIAGNOSIS — G89.29 CHRONIC RIGHT-SIDED LOW BACK PAIN WITHOUT SCIATICA: Primary | ICD-10-CM

## 2021-03-17 DIAGNOSIS — M54.50 CHRONIC RIGHT-SIDED LOW BACK PAIN WITHOUT SCIATICA: Primary | ICD-10-CM

## 2021-03-17 DIAGNOSIS — M25.551 PAIN IN RIGHT HIP: ICD-10-CM

## 2021-03-17 DIAGNOSIS — M25.551 GREATER TROCHANTERIC PAIN SYNDROME OF RIGHT LOWER EXTREMITY: ICD-10-CM

## 2021-03-17 PROCEDURE — 97112 NEUROMUSCULAR REEDUCATION: CPT | Performed by: PHYSICAL THERAPIST

## 2021-03-17 PROCEDURE — 97161 PT EVAL LOW COMPLEX 20 MIN: CPT | Performed by: PHYSICAL THERAPIST

## 2021-03-17 PROCEDURE — 97110 THERAPEUTIC EXERCISES: CPT | Performed by: PHYSICAL THERAPIST

## 2021-03-17 NOTE — PROGRESS NOTES
PT Evaluation     Today's date: 3/17/2021  Patient name: Tc Palencia  : 1965  MRN: [id number]  Referring provider: Abel Welch DO  Dx:   Encounter Diagnosis     ICD-10-CM    1  Chronic right-sided low back pain without sciatica  M54 5 Ambulatory referral to Physical Therapy    G89 29    2  Pain in right hip  M25 551 Ambulatory referral to Physical Therapy   3  Greater trochanteric pain syndrome of right lower extremity  M25 551 Ambulatory referral to Physical Therapy       Start Time: 09  Stop Time: 1020  Total time in clinic (min): 40 minutes    Assessment  Assessment details: Tc Palencia is a 60 y o  male who presents with complaints of Chronic right-sided low back pain without sciatica  (primary encounter diagnosis)  Pain in right hip, Greater trochanteric pain syndrome of right lower extremity  No further referral appears necessary at this time based upon examination results  Patient is presenting with extension directional preference with pt diagnosis of lumbar derangement leading to limitations with sitting, standing, walking, sleeping, performing transitions, and driving  Symptoms improved with prone press up centralizing patient complaint  Prognosis is good given HEP compliance and PT 1-2/wk  Positive prognostic indicators include positive attitude toward recovery  Please contact me if you have any questions or recommendations  Thank you for the opportunity to share in [place] care       Impairments: abnormal muscle firing, abnormal muscle tone, abnormal or restricted ROM, abnormal movement, impaired physical strength, lacks appropriate home exercise program, pain with function and poor posture     Symptom irritability: moderateUnderstanding of Dx/Px/POC: good   Prognosis: good    Plan  Patient would benefit from: skilled physical therapy  Planned therapy interventions: joint mobilization, manual therapy, patient education, postural training, strengthening, stretching, therapeutic activities, therapeutic exercise, home exercise program, neuromuscular re-education, flexibility and functional ROM exercises  Frequency: 1-2x  Duration in weeks: 8  Treatment plan discussed with: patient        Subjective Evaluation    History of Present Illness  Mechanism of injury: Patient reports having sciatica years ago on right LE with injection that helped  He reports recently that he has been having lateral hip and anterior hip pain that has been feeling better since injection  Reports tingling that can go down to his right knee anterior region  Does report standing for long periods of time will increase symptoms as well as twisting and packing items while at work  Does report bending at work will increase symptoms  Also reports that standing still will irritate him but improves slightly with walking  Does have discomfort with stair navigation, now lives in ranch style  Also had pain with lifting leg while driving moving between gas to brake  Had terrible pain prior entering/exiting vehicle  Does have pulling in low back with rising from seated position  Reports self manipulation will help symptoms  Patient denies bowel/bladder dysfunction, saddle paraesthesias, nor cough/sneeze provocation  Occasional radiation of symptoms into right calf  Pain will wake patient when sleeping, as he is side sleeper               Recurrent probem    Quality of life: good    Pain  Current pain rating: 3  At worst pain ratin  Aggravating factors: sitting, standing, walking and stair climbing    Treatments  Previous treatment: injection treatment  Current treatment: injection treatment  Patient Goals  Patient goals for therapy: decreased pain and increased strength  Patient goal: increase standing and walking tolerance        Objective    Flowsheet Rows      Most Recent Value   PT/OT G-Codes   Current Score  56   Projected Score  67             GAIT: antalgic gait RLE  Squat assess: 50% depth  Heel toe walk: -    Lumbar  % of normal   Flex  100   Extn  75   SB Left 75   SB Right 50   ROT Left 100   ROT Right 75   Repetitive testing: extension in standing= no change  extension in lying= better    flexion standing= worse           MMT         AROM          PROM    Hip       L       R        L           R      L     R   Flex  5 4       Extn  5 4       Abd  5 4       Add  5 5       IR  5 5 > 35 > 35 40 40   ER  5 4                     MMT    Knee         L        R   Flex  5 5   Extn  5 5                     MMT    Ankle       L        R   PF 5 5   DF  5 5   EHL 5 5   Inv  5 5   Ever  5 5     Posture: poor posture hip pain, postural correction- improves hip and centralizing  Palpation: L3 TTP   Slump test: L=  -   R=   -    Straight leg raise:   L= -     R=   -   Femoral nerve test: not performed           Transverse abdominis: Bent knee fall out= Poor supine march=Poor     Hip/SI joint:   scour=   -    sarthak =  -   capsular mobility=  -  long axis distraction (hip) = -     Active SLR= +   Active SLR with stabilization = +    [place] scan= -         Hamstring dominance test=   +       Hip abd/lat rot  =  +       prone knee flexion= -  Multifidus activation = fair Joint mobility = hypomobile L3     Precautions: None    Patient provided verbal consent to treatment plan and recommended interventions  Manual                                        Neuro Re-Ed        PPU 2x10       RESI 10x       Clam shell 1x10       TrA BKFO 2x10                               There Ex        Pt edu  FB                                               There Activity                Gait Training                Modalities                          Short Term:  1  Pt will report decreased levels of pain by at least 2 subjective ratings in 4 weeks  2  Pt will demonstrate improved ROM by at least 10 degrees in 4 weeks  3  Pt will demonstrate improved strength by 1/2 grade in 4 weeks    4  Pt will be able to stand/walk > 20 minutes without pain in 4 weeks  Long Term:   1  Pt will be independent in their HEP in 8 weeks  2  Pt will be pain free with IADL's in 8 weeks  3  Pt will demonstrate improved FOTO, > 11 in 8 weeks    4  Pt will be able to sit/drive > 30 minutes without symptmos in 8 weeks

## 2021-03-22 ENCOUNTER — OFFICE VISIT (OUTPATIENT)
Dept: PHYSICAL THERAPY | Age: 56
End: 2021-03-22
Payer: COMMERCIAL

## 2021-03-22 DIAGNOSIS — M54.50 CHRONIC RIGHT-SIDED LOW BACK PAIN WITHOUT SCIATICA: Primary | ICD-10-CM

## 2021-03-22 DIAGNOSIS — G89.29 CHRONIC RIGHT-SIDED LOW BACK PAIN WITHOUT SCIATICA: Primary | ICD-10-CM

## 2021-03-22 DIAGNOSIS — M25.551 PAIN IN RIGHT HIP: ICD-10-CM

## 2021-03-22 PROCEDURE — 97140 MANUAL THERAPY 1/> REGIONS: CPT | Performed by: PHYSICAL THERAPIST

## 2021-03-22 PROCEDURE — 97112 NEUROMUSCULAR REEDUCATION: CPT | Performed by: PHYSICAL THERAPIST

## 2021-03-22 PROCEDURE — 97110 THERAPEUTIC EXERCISES: CPT | Performed by: PHYSICAL THERAPIST

## 2021-03-22 NOTE — PROGRESS NOTES
Daily Note     Today's date: 3/22/2021  Patient name: Melissa Brand  : 1965  MRN: [id number]  Referring provider: Meagan Bernal DO  Dx:   Encounter Diagnosis     ICD-10-CM    1  Chronic right-sided low back pain without sciatica  M54 5     G89 29    2  Pain in right hip  M25 551        Start Time: 1030  Stop Time: 1100  Total time in clinic (min): 30 minutes    Subjective: Patient reports significant improvement in symptoms since evaluation  Reports no longer having hip pain with driving  Objective: See treatment diary below    Assessment: Tolerated treatment well  Patient Patient reported feeling good with treatment  He continued to report feeling like back needed to "crack"  Educated on self manipulation and role of therapy  He will continue to perform HEP and return next week progressing to flexion based movements if symptom response continues to improve  Plan: Continue per plan of care  Precautions: None    Patient provided verbal consent to treatment plan and recommended interventions  Manual 3 17 3 22      P/a mobs  FB                              Neuro Re-Ed        PPU 2x10 2x10      RESI 10x       Clam shell 1x10       TrA BKFO 2x10 2x10                              There Ex        Pt edu  FB FB      Prone hip extn  2x10 ea       bridge  10x      DKTC  5x                      There Activity                Gait Training                Modalities                          Short Term:  1  Pt will report decreased levels of pain by at least 2 subjective ratings in 4 weeks  2  Pt will demonstrate improved ROM by at least 10 degrees in 4 weeks  3  Pt will demonstrate improved strength by 1/2 grade in 4 weeks  4  Pt will be able to stand/walk > 20 minutes without pain in 4 weeks  Long Term:   1  Pt will be independent in their HEP in 8 weeks  2  Pt will be pain free with IADL's in 8 weeks  3  Pt will demonstrate improved FOTO, > 11 in 8 weeks    4  Pt will be able to sit/drive > 30 minutes without symptmos in 8 weeks

## 2021-03-29 ENCOUNTER — OFFICE VISIT (OUTPATIENT)
Dept: PHYSICAL THERAPY | Age: 56
End: 2021-03-29
Payer: COMMERCIAL

## 2021-03-29 DIAGNOSIS — M25.551 PAIN IN RIGHT HIP: ICD-10-CM

## 2021-03-29 DIAGNOSIS — G89.29 CHRONIC RIGHT-SIDED LOW BACK PAIN WITHOUT SCIATICA: Primary | ICD-10-CM

## 2021-03-29 DIAGNOSIS — M25.551 GREATER TROCHANTERIC PAIN SYNDROME OF RIGHT LOWER EXTREMITY: ICD-10-CM

## 2021-03-29 DIAGNOSIS — M54.50 CHRONIC RIGHT-SIDED LOW BACK PAIN WITHOUT SCIATICA: Primary | ICD-10-CM

## 2021-03-29 PROCEDURE — 97140 MANUAL THERAPY 1/> REGIONS: CPT | Performed by: PHYSICAL THERAPIST

## 2021-03-29 PROCEDURE — 97110 THERAPEUTIC EXERCISES: CPT | Performed by: PHYSICAL THERAPIST

## 2021-03-29 NOTE — PROGRESS NOTES
Daily Note     Today's date: 3/29/2021  Patient name: Terance Schirmer  : 1965  MRN: [id number]  Referring provider: Adrian Batista DO  Dx:   Encounter Diagnosis     ICD-10-CM    1  Chronic right-sided low back pain without sciatica  M54 5     G89 29    2  Pain in right hip  M25 551    3  Greater trochanteric pain syndrome of right lower extremity  M25 551                   Subjective: Patient reports back is doing okay lately, right hip discomfort entering treatment today  Reports walking 1 5 hours on Saturday  Objective: See treatment diary below    Assessment: Tolerated treatment well  Patient reported reduced hip discomfort following gluteal activation based exercises and quad rock  Plan: Continue per plan of care  Precautions: None    Patient provided verbal consent to treatment plan and recommended interventions  Manual 3 17 3 22 3 29     P/a mobs  FB                              Neuro Re-Ed        PPU 2x10 2x10      NAOMI 10x       Clam shell 1x10  2x10     TrA BKFO 2x10 2x10 3x10     TrA iso   2x10, 5"                     There Ex        Pt edu   FB FB      Prone hip extn  2x10 ea       bridge  10x 2x10     DKTC  5x      bike   6'     Vigor gym   2x2'     Quad rock   20x                                     There Activity                Gait Training                Modalities

## 2021-03-30 DIAGNOSIS — Z23 ENCOUNTER FOR IMMUNIZATION: ICD-10-CM

## 2021-03-31 ENCOUNTER — APPOINTMENT (OUTPATIENT)
Dept: PHYSICAL THERAPY | Age: 56
End: 2021-03-31
Payer: COMMERCIAL

## 2021-04-02 ENCOUNTER — OFFICE VISIT (OUTPATIENT)
Dept: PHYSICAL THERAPY | Age: 56
End: 2021-04-02
Payer: COMMERCIAL

## 2021-04-02 DIAGNOSIS — M54.50 CHRONIC RIGHT-SIDED LOW BACK PAIN WITHOUT SCIATICA: Primary | ICD-10-CM

## 2021-04-02 DIAGNOSIS — M25.551 PAIN IN RIGHT HIP: ICD-10-CM

## 2021-04-02 DIAGNOSIS — M25.551 GREATER TROCHANTERIC PAIN SYNDROME OF RIGHT LOWER EXTREMITY: ICD-10-CM

## 2021-04-02 DIAGNOSIS — G89.29 CHRONIC RIGHT-SIDED LOW BACK PAIN WITHOUT SCIATICA: Primary | ICD-10-CM

## 2021-04-02 PROCEDURE — 97110 THERAPEUTIC EXERCISES: CPT

## 2021-04-02 PROCEDURE — 97112 NEUROMUSCULAR REEDUCATION: CPT

## 2021-04-02 NOTE — PROGRESS NOTES
Daily Note     Today's date: 2021  Patient name: Farshad Dalton  : 1965  MRN: [id number]  Referring provider: Christin Griffith DO  Dx:   Encounter Diagnosis     ICD-10-CM    1  Chronic right-sided low back pain without sciatica  M54 5     G89 29    2  Pain in right hip  M25 551    3  Greater trochanteric pain syndrome of right lower extremity  M25 551                   Subjective: pt reports feeling a little better but still having radicular sx down R leg, pt reports HEP compliance "the leg felt better last week but bothers me more this week  We went for 1 5 hr walk over the weekend and that didn't help" pt reports pain LB that radiates R hip/thigh      Objective: See treatment diary below  Pt ed for posture and for proper glut/TrA activation       Assessment: cueing throughout for proper ex technique, pt tends to sub with TFL/anterior thigh vs gluts, progressed ex to help with glut activation      Plan: Continue per plan of care  Progress treatment as tolerated  Precautions: None    Patient provided verbal consent to treatment plan and recommended interventions  Manual 3 17 3 22 3 29 21    P/a mobs  FB                              Neuro Re-Ed        PPU 2x10 2x10  2x10    NAOMI 10x   2x10    Clam shell 1x10  2x10 2x10x3"    TrA BKFO 2x10 2x10 3x10 3x10    TrA iso   2x10, 5" 2x10x5"    TrA marches    2x10            There Ex        Pt edu  FB FB  VK    Prone hip extn  2x10 ea    2x10 ea BL    bridge  10x 2x10 2x10x5"    DKTC  5x      bike   6' np    Vigor gym   2x2' 5' lvl 10    Quad rock   20x 20x                                    There Activity                Gait Training                Modalities

## 2021-04-07 ENCOUNTER — TELEPHONE (OUTPATIENT)
Dept: OBGYN CLINIC | Facility: MEDICAL CENTER | Age: 56
End: 2021-04-07

## 2021-04-07 ENCOUNTER — OFFICE VISIT (OUTPATIENT)
Dept: PHYSICAL THERAPY | Age: 56
End: 2021-04-07
Payer: COMMERCIAL

## 2021-04-07 DIAGNOSIS — G89.29 CHRONIC RIGHT-SIDED LOW BACK PAIN WITHOUT SCIATICA: Primary | ICD-10-CM

## 2021-04-07 DIAGNOSIS — M54.50 CHRONIC RIGHT-SIDED LOW BACK PAIN WITHOUT SCIATICA: Primary | ICD-10-CM

## 2021-04-07 DIAGNOSIS — M25.551 GREATER TROCHANTERIC PAIN SYNDROME OF RIGHT LOWER EXTREMITY: ICD-10-CM

## 2021-04-07 DIAGNOSIS — M25.551 PAIN IN RIGHT HIP: ICD-10-CM

## 2021-04-07 PROCEDURE — 97110 THERAPEUTIC EXERCISES: CPT | Performed by: PHYSICAL THERAPIST

## 2021-04-07 PROCEDURE — 97112 NEUROMUSCULAR REEDUCATION: CPT | Performed by: PHYSICAL THERAPIST

## 2021-04-07 PROCEDURE — 97140 MANUAL THERAPY 1/> REGIONS: CPT | Performed by: PHYSICAL THERAPIST

## 2021-04-07 NOTE — TELEPHONE ENCOUNTER
Rodolfo Ortega  #:[phone]        Patient called in requesting a call back  He saw his PT today and they are recommended patient seeing a pain management dr MCMAHON had stated patient isn't improving  Patient has a follow up appt 4/15  He wants to know if Rodolfo Ortega wants to follow up with him or should he see pain management instead?

## 2021-04-07 NOTE — PROGRESS NOTES
Daily Note     Today's date: 2021  Patient name: Farshad Dalton  : 1965  MRN: [id number]  Referring provider: Christin Griffith DO  Dx:   Encounter Diagnosis     ICD-10-CM    1  Chronic right-sided low back pain without sciatica  M54 5     G89 29    2  Pain in right hip  M25 551    3  Greater trochanteric pain syndrome of right lower extremity  M25 551      Start Time: 1015  Stop Time: 1055  Total time in clinic (min): 40 minutes  Subjective: Patient reports doing about the same overall  Reports pain present all the time not extending below knee  Symptoms described as numb  Objective: See treatment diary below  Pt ed for posture and for proper glut/TrA activation     Assessment: No change in symptoms in thigh reported with directional preference assessment nor with manual intervention  Patient to be placed on hold with referral back to referring DO to assess for further intervention  Plan: referral back to Dr Colby Wilkins  Precautions: None    Patient provided verbal consent to treatment plan and recommended interventions  Manual 3 17 3 22 3 29 21   P/a mobs  FB   FB   L4 gapping     FB   L4 nerve glides     FB           Neuro Re-Ed        PPU 2x10 2x10  2x10 2*10   NAOMI 10x   2x10 1*10    Clam shell 1x10  2x10 2x10x3"    TrA BKFO 2x10 2x10 3x10 3x10    TrA iso   2x10, 5" 2x10x5"    TrA marches    2x10            There Ex        Pt edu  FB FB  VK    Prone hip extn  2x10 ea    2x10 ea BL    bridge  10x 2x10 2x10x5"    DKTC  5x      bike   6' np 6'   Vigor gym   2x2' 5' lvl 10    Quad rock   20x 20x    Pt assessment     15                           There Activity                Gait Training                Modalities

## 2021-04-07 NOTE — TELEPHONE ENCOUNTER
I want to see him back  He has had no imaging of his spine  We can obtain imaging and depending on his presentation, can consider pain management  Thank you

## 2021-04-07 NOTE — TELEPHONE ENCOUNTER
Called patient and relay msg above  Verbalized understanding  He will be keeping his appointment         Thank you!!

## 2021-04-15 ENCOUNTER — OFFICE VISIT (OUTPATIENT)
Dept: OBGYN CLINIC | Facility: MEDICAL CENTER | Age: 56
End: 2021-04-15
Payer: COMMERCIAL

## 2021-04-15 ENCOUNTER — APPOINTMENT (OUTPATIENT)
Dept: RADIOLOGY | Facility: MEDICAL CENTER | Age: 56
End: 2021-04-15
Payer: COMMERCIAL

## 2021-04-15 VITALS
DIASTOLIC BLOOD PRESSURE: 83 MMHG | WEIGHT: 225 LBS | SYSTOLIC BLOOD PRESSURE: 128 MMHG | HEART RATE: 82 BPM | BODY MASS INDEX: 28.88 KG/M2 | HEIGHT: 74 IN

## 2021-04-15 DIAGNOSIS — M54.50 CHRONIC RIGHT-SIDED LOW BACK PAIN WITHOUT SCIATICA: ICD-10-CM

## 2021-04-15 DIAGNOSIS — M51.36 LUMBAR DEGENERATIVE DISC DISEASE: Primary | ICD-10-CM

## 2021-04-15 DIAGNOSIS — G89.29 CHRONIC RIGHT-SIDED LOW BACK PAIN WITHOUT SCIATICA: ICD-10-CM

## 2021-04-15 DIAGNOSIS — M25.551 PAIN IN RIGHT HIP: ICD-10-CM

## 2021-04-15 PROBLEM — M51.369 LUMBAR DEGENERATIVE DISC DISEASE: Status: ACTIVE | Noted: 2021-04-15

## 2021-04-15 PROCEDURE — 99213 OFFICE O/P EST LOW 20 MIN: CPT | Performed by: PHYSICAL MEDICINE & REHABILITATION

## 2021-04-15 PROCEDURE — 72100 X-RAY EXAM L-S SPINE 2/3 VWS: CPT

## 2021-04-15 PROCEDURE — 1036F TOBACCO NON-USER: CPT | Performed by: PHYSICAL MEDICINE & REHABILITATION

## 2021-04-15 PROCEDURE — 3008F BODY MASS INDEX DOCD: CPT | Performed by: PHYSICAL MEDICINE & REHABILITATION

## 2021-04-15 NOTE — PROGRESS NOTES
1  Lumbar degenerative disc disease  MRI lumbar spine wo contrast    Ambulatory referral to Pain Management   2  Chronic right-sided low back pain without sciatica  XR spine lumbar 2 or 3 views injury    MRI lumbar spine wo contrast    Ambulatory referral to Pain Management   3  Pain in right hip  MRI lumbar spine wo contrast    Ambulatory referral to Pain Management     Orders Placed This Encounter   Procedures    XR spine lumbar 2 or 3 views injury    MRI lumbar spine wo contrast    Ambulatory referral to Pain Management        Impression:  The patient's pain is multifactorial and is predominantly due to right lumbar radiculopathy/degenerative disc disease and hip flexor tendinopathy  There are no concerning neurological deficits  Patient presents after going through physical therapy and home exercise program with no improvement        On his last visit, he had a right GT steroid injection and has been taking meloxicam with Tylenol  This has provided him minimal relief, if any      We reviewed his lumbar spine x-ray as below  At this point, we will have him obtain an MRI of his lumbar spine  I will call him with the results  Depending on what is seen, can consider having him see pain management  Patient reports that after he stretched his back lifting something, his pain improved and now it is mostly on the anterior hip  No hip joint signs on exam     Imaging Studies (I personally reviewed images in PACS and report):  Right hip x-rays that are most recent to this encounter were reviewed  These images show   A slightly irregular shape of the femoral head consistent with a cam deformity  There is a radiodensity at the greater trochanter which could represent calcification  There is a slight curvature of the lumbar spine and degenerative changes of the lower lumbar spine  No acute osseous abnormalities  Lumbar spine x-rays obtained on 4/15/2021    These images show an S shaped curvature of the lumbar spine  There is diffuse degenerative disc disease with complete loss of L5-S1 disc space and possible vacuum phenomenom  There is grade 1 anterolisthesis of L4 on 5 without obvious pars defect  No follow-ups on file  HPI:  Leopold Montana is a 60 y o  male  who presents in follow up  Here for   Chief Complaint   Patient presents with    Spine - Follow-up    Right Hip - Follow-up       Date of injury: Pain for the past few months without an injury  Trajectory of symptoms: Feels 60% improved  See above  Review of Systems   Constitutional: Positive for activity change  Negative for fever  HENT: Negative for trouble swallowing  Eyes: Negative for visual disturbance  Respiratory: Negative for shortness of breath  Cardiovascular: Negative for chest pain  Gastrointestinal: Negative for abdominal pain  Denies bowel incontinence  Endocrine: Negative for polydipsia  Genitourinary:        Denies urinary incontinence  Musculoskeletal: Positive for back pain  Negative for gait problem  Skin: Negative for rash  Allergic/Immunologic: Negative for immunocompromised state  Neurological: Negative for weakness and numbness  Denies saddle anesthesia  Hematological: Does not bruise/bleed easily  Psychiatric/Behavioral: Negative for confusion  Following history reviewed and updated:  Past Medical History:   Diagnosis Date    Acute sinusitis     Allergic rhinitis     Chronic sinusitis     last assessed 10/23/2012    Hyperlipemia     Kidney stone 5/2001     Past Surgical History:   Procedure Laterality Date    CHOLECYSTECTOMY      COLONOSCOPY N/A 1/22/2016    Procedure: COLONOSCOPY;  Surgeon: Tonya Kaur MD;  Location: [facility];   Service:    [address] [place] [address]      In your records    LITHOTRIPSY      In your records    ORTHOPEDIC SURGERY Right 1997    Knee    SHOULDER ARTHROSCOPY Right      Social History   Social History Substance and Sexual Activity   Alcohol Use Yes    Types: 6 Cans of beer per week    Frequency: 2-4 times a month    Comment: social     Social History     Substance and Sexual Activity   Drug Use Never     Social History     Tobacco Use   Smoking Status Never Smoker   Smokeless Tobacco Never Used     Family History   Problem Relation Age of Onset    Nephrolithiasis Mother     Hyperlipidemia Mother     Hypertension Mother     Urolithiasis Mother     Hyperlipidemia Father     Nephrolithiasis Brother      No Known Allergies     Constitutional:  /83 (BP Location: Right arm, Patient Position: Sitting, Cuff Size: Standard)   Pulse 82   Ht 6' 2" (1 88 m)   Wt 102 kg (225 lb)   BMI 28 89 kg/m²    General: NAD  Eyes: Clear sclerae  ENT: No inflammation, lesion, or mass of lips  No tracheal deviation  Musculoskeletal: As mentioned below  Integumentary: No visible rashes or skin lesions  Pulmonary/Chest: Effort normal  No respiratory distress  Neuro: CN's grossly intact, NAYAK  Psych: Normal affect and judgement  Vascular: WWP  Back Exam     Range of Motion   The patient has normal back ROM  Muscle Strength   The patient has normal back strength  Reflexes   Patellar: normal  Achilles: normal    Other   Gait: normal   Erythema: no back redness  Scars: absent    Comments:  Normal Stinchfield, log roll and hip scouring maneuver               Procedures

## 2021-04-29 DIAGNOSIS — A09 TRAVELER'S DIARRHEA: Primary | ICD-10-CM

## 2021-04-29 RX ORDER — CIPROFLOXACIN 500 MG/1
500 TABLET, FILM COATED ORAL
Qty: 6 TABLET | Refills: 0 | Status: SHIPPED | OUTPATIENT
Start: 2021-04-29 | End: 2021-05-02

## 2021-05-02 ENCOUNTER — HOSPITAL ENCOUNTER (OUTPATIENT)
Dept: RADIOLOGY | Age: 56
Discharge: HOME | End: 2021-05-02
Payer: COMMERCIAL

## 2021-05-02 DIAGNOSIS — G89.29 CHRONIC RIGHT-SIDED LOW BACK PAIN WITHOUT SCIATICA: ICD-10-CM

## 2021-05-02 DIAGNOSIS — M54.50 CHRONIC RIGHT-SIDED LOW BACK PAIN WITHOUT SCIATICA: ICD-10-CM

## 2021-05-02 DIAGNOSIS — M25.551 PAIN IN RIGHT HIP: ICD-10-CM

## 2021-05-02 DIAGNOSIS — M25.551 GREATER TROCHANTERIC PAIN SYNDROME OF RIGHT LOWER EXTREMITY: ICD-10-CM

## 2021-05-02 DIAGNOSIS — M51.36 LUMBAR DEGENERATIVE DISC DISEASE: ICD-10-CM

## 2021-05-02 PROCEDURE — 72148 MRI LUMBAR SPINE W/O DYE: CPT

## 2021-05-02 PROCEDURE — G1004 CDSM NDSC: HCPCS

## 2021-05-03 RX ORDER — MELOXICAM 15 MG/1
TABLET ORAL
Qty: 30 TABLET | Refills: 1 | Status: SHIPPED | OUTPATIENT
Start: 2021-05-03 | End: 2022-02-24

## 2021-05-09 DIAGNOSIS — E78.01 FAMILIAL HYPERCHOLESTEROLEMIA: ICD-10-CM

## 2021-05-09 RX ORDER — ATORVASTATIN CALCIUM 80 MG/1
TABLET, FILM COATED ORAL
Qty: 90 TABLET | Refills: 1 | Status: SHIPPED | OUTPATIENT
Start: 2021-05-09 | End: 2021-09-07 | Stop reason: SDUPTHER

## 2021-05-13 ENCOUNTER — TELEPHONE (OUTPATIENT)
Dept: OBGYN CLINIC | Facility: HOSPITAL | Age: 56
End: 2021-05-13

## 2021-05-13 NOTE — TELEPHONE ENCOUNTER
Patient sees Dr Mcdonald [facility] is calling to advise the date of service 3/11/21 was submitted with the incorrect prefix of the ID#  It should be submitted as [id number] instead of [id number]  Billing advised her to call the office and have it resubmitted

## 2021-05-25 ENCOUNTER — CONSULT (OUTPATIENT)
Dept: PAIN MEDICINE | Facility: CLINIC | Age: 56
End: 2021-05-25
Payer: COMMERCIAL

## 2021-05-25 ENCOUNTER — TRANSCRIBE ORDERS (OUTPATIENT)
Dept: PAIN MEDICINE | Facility: CLINIC | Age: 56
End: 2021-05-25

## 2021-05-25 VITALS
WEIGHT: 225 LBS | DIASTOLIC BLOOD PRESSURE: 76 MMHG | BODY MASS INDEX: 28.89 KG/M2 | SYSTOLIC BLOOD PRESSURE: 134 MMHG | HEART RATE: 85 BPM

## 2021-05-25 DIAGNOSIS — M51.26 LUMBAR DISC HERNIATION: ICD-10-CM

## 2021-05-25 DIAGNOSIS — M25.551 PAIN IN RIGHT HIP: ICD-10-CM

## 2021-05-25 DIAGNOSIS — M51.36 LUMBAR DEGENERATIVE DISC DISEASE: ICD-10-CM

## 2021-05-25 DIAGNOSIS — M51.16 LUMBAR DISC DISEASE WITH RADICULOPATHY: Primary | ICD-10-CM

## 2021-05-25 DIAGNOSIS — M62.838 MUSCLE SPASM: ICD-10-CM

## 2021-05-25 DIAGNOSIS — G89.29 CHRONIC RIGHT-SIDED LOW BACK PAIN WITHOUT SCIATICA: ICD-10-CM

## 2021-05-25 DIAGNOSIS — M54.50 CHRONIC RIGHT-SIDED LOW BACK PAIN WITHOUT SCIATICA: ICD-10-CM

## 2021-05-25 PROCEDURE — 1036F TOBACCO NON-USER: CPT | Performed by: PHYSICAL MEDICINE & REHABILITATION

## 2021-05-25 PROCEDURE — 99244 OFF/OP CNSLTJ NEW/EST MOD 40: CPT | Performed by: PHYSICAL MEDICINE & REHABILITATION

## 2021-05-25 RX ORDER — METHOCARBAMOL 500 MG/1
500 TABLET, FILM COATED ORAL 2 TIMES DAILY PRN
Qty: 60 TABLET | Refills: 1 | Status: SHIPPED | OUTPATIENT
Start: 2021-05-25 | End: 2022-02-24

## 2021-05-25 RX ORDER — GABAPENTIN 300 MG/1
300 CAPSULE ORAL DAILY
Qty: 30 CAPSULE | Refills: 2 | Status: SHIPPED | OUTPATIENT
Start: 2021-05-25 | End: 2021-09-08 | Stop reason: SDUPTHER

## 2021-05-25 NOTE — LETTER
May 25, 2021     Jeb Wilkinson DO  [address] [facility]  [address]    Patient: Mikaela Frederick   YOB: 1965   Date of Visit: 5/25/2021       Dear Dr Danita Giles:    Thank you for referring Chris Adams to me for evaluation  Below are my notes for this consultation  If you have questions, please do not hesitate to call me  I look forward to following your patient along with you  Sincerely,        Parish Ruggiero DO        CC: No Recipients  Parish Ruggiero DO  5/25/2021 10:17 AM  Signed  Assessment  1  Chronic right-sided low back pain without sciatica    2  Pain in right hip    3  Lumbar degenerative disc disease        Plan  Mr Rosalva Maya is a pleasant 68-year-old male who presents for initial evaluation regarding low back pain with radiating symptoms into the right worse than left lower extremity  During today's evaluation he is demonstrating clinical and diagnostic evidence of lumbar radiculopathy likely in relation to the multilevel foraminal stenosis as well as degenerative disc disease most notable at L4-L5 and L5-S1 and a central disc extrusion at L5-S1  He has already tried conservative measures with physical therapy, home exercises and meloxicam which is provided minimal relief in his pain  At this time further interventional approaches would be beneficial and warranted  As such we will   1  Plan for a lumbar epidural steroid injection right paramedian approach L5-S1   2  Will start gabapentin 300 mg daily  Patient works night shifts and operates a forklift so I advised him not to take the medication while operating heavy machinery and only take after work for now  3  Robaxin 500 mg twice a day as needed for muscle spasms   4  Complete risks and benefits including bleeding, infection, tissue reaction, nerve injury and allergic reaction were discussed  The approach was demonstrated using models and literature was provided  Verbal and written consent was obtained  My impressions and treatment recommendations were discussed in detail with the patient who verbalized understanding and had no further questions  Discharge instructions were provided  I personally saw and examined the patient and I agree with the above discussed plan of care  No orders of the defined types were placed in this encounter  No orders of the defined types were placed in this encounter  History of Present Illness    Obie Quintero is a 60 y o  male presents to [place] [address] [facility] for initial evaluation and referral from Dr Earnestine Del Rio regarding lumbar degenerative disc disease and radiculopathy  Patient denies any significant inciting event or recent trauma  Today he reports severe pain rated 9/10 and interfering with daily activities  Pain is constant 100% of the time that is present throughout the day and night  Describes symptoms of burning, numbness, sharp, throbbing, pins Abida, pressure-like pain  Also complains of lower extremity weakness but denies falls  Does not use any durable medical equipment for ambulation  Symptoms are worse with standing, bending, walking, exercise  He has had excellent relief from previous injection several years ago and no significant improvements with physical therapy or heat and ice  Currently taking meloxicam for pain which is provided minimal relief  Presents today for initial evaluation  I have personally reviewed and/or updated the patient's past medical history, past surgical history, family history, social history, current medications, allergies, and vital signs today  Review of Systems   Constitutional: Negative for fever and unexpected weight change  HENT: Negative for trouble swallowing  Eyes: Negative for visual disturbance  Respiratory: Negative for shortness of breath and wheezing  Cardiovascular: Negative for chest pain and palpitations     Gastrointestinal: Negative for constipation, diarrhea, nausea and vomiting  Endocrine: Negative for cold intolerance, heat intolerance and polydipsia  Genitourinary: Negative for difficulty urinating and frequency  Musculoskeletal: Positive for back pain, gait problem and joint swelling  Negative for arthralgias and myalgias  Skin: Negative for rash  Neurological: Positive for weakness and numbness  Negative for dizziness, seizures, syncope and headaches  Hematological: Does not bruise/bleed easily  Psychiatric/Behavioral: Negative for dysphoric mood  All other systems reviewed and are negative  Patient Active Problem List   Diagnosis    Kidney stones    Complex renal cyst    Hyperlipidemia    Coronary artery calcification    Chronic right-sided low back pain without sciatica    Pain in right hip    Lumbar degenerative disc disease       Past Medical History:   Diagnosis Date    Acute sinusitis     Allergic rhinitis     Chronic sinusitis     last assessed 10/23/2012    Hyperlipemia     Kidney stone 5/2001       Past Surgical History:   Procedure Laterality Date    CHOLECYSTECTOMY      COLONOSCOPY N/A 1/22/2016    Procedure: COLONOSCOPY;  Surgeon: Faisal Oquendo MD;  Location: [facility];   Service:    [facility]      In your records    LITHOTRIPSY      In your records    ORTHOPEDIC SURGERY Right 1997    Knee    SHOULDER ARTHROSCOPY Right        Family History   Problem Relation Age of Onset    Nephrolithiasis Mother     Hyperlipidemia Mother     Hypertension Mother     Urolithiasis Mother     Hyperlipidemia Father     Nephrolithiasis Brother        Social History     Occupational History     Employer: [employer]   Tobacco Use    Smoking status: Never Smoker    Smokeless tobacco: Never Used   Substance and Sexual Activity    Alcohol use: Yes     Types: 6 Cans of beer per week     Frequency: 2-4 times a month     Comment: social    Drug use: Never    Sexual activity: Yes     Partners: Female Birth control/protection: None       Current Outpatient Medications on File Prior to Visit   Medication Sig    Ascorbic Acid (Vitamin C) 500 MG CHEW Chew 1 Dose daily    aspirin 81 MG tablet Take 81 mg by mouth daily   atorvastatin (LIPITOR) 80 mg tablet TAKE 1 TABLET BY MOUTH EVERY DAY    b complex vitamins capsule Take 1 capsule by mouth daily   Cholecalciferol (D3-1000) 25 MCG (1000 UT) capsule Take 2 capsules by mouth daily    Coconut Oil 1000 MG CAPS Take 1 capsule by mouth daily    Loratadine 10 MG CAPS Take 1 capsule by mouth daily    Magnesium 500 MG TABS Take 1 tablet by mouth daily    meloxicam (MOBIC) 15 mg tablet TAKE 1 TABLET BY MOUTH EVERY DAY AS NEEDED FOR PAIN    Multiple Vitamin (MULTIVITAMIN) capsule Take 1 capsule by mouth daily   Omega-3 Fatty Acids (fish oil) 1,000 mg Take 1,000 mg by mouth daily    Turmeric (QC Tumeric Complex) 500 MG CAPS Take 1 capsule by mouth daily    vitamin E, tocopherol, 400 units capsule Take 400 Units by mouth daily   Zinc 50 MG TABS Take 1 tablet by mouth daily     No current facility-administered medications on file prior to visit  No Known Allergies    Physical Exam    Wt 102 kg (225 lb)   BMI 28 89 kg/m²     Constitutional: normal, well developed, well nourished, alert, in no distress and non-toxic and no overt pain behavior    Eyes: anicteric  HEENT: grossly intact  Neck: supple, symmetric, trachea midline and no masses   Pulmonary:even and unlabored  Cardiovascular:No edema or pitting edema present  Skin:Normal without rashes or lesions and well hydrated  Psychiatric:Mood and affect appropriate  Neurologic:Cranial Nerves II-XII grossly intact  Musculoskeletal:antalgic, tenderness to palpation bilateral lumbar paraspinals, decreased active and passive range of motion with lumbar flexion and extension limited by pain, MMT 5/5 bilateral lower extremities, sensation decreased to light touch in patchy distribution right lower extremity, straight leg raise positive for radicular pain in the seated position into the right leg, DTRs within normal limits    Imaging    MRI LUMBAR SPINE WITHOUT CONTRAST     INDICATION: M54 5: Low back pain  G89 29: Other chronic pain  M25 551: Pain in right hip  M51 36: Other intervertebral disc degeneration, lumbar region      COMPARISON:  None      TECHNIQUE:  Sagittal T1, sagittal T2, sagittal inversion recovery, axial T1 and axial T2, coronal T2     IMAGE QUALITY:  Diagnostic     FINDINGS:     VERTEBRAL BODIES:  There are 5 lumbar type vertebral bodies  Grade 1 anterior spondylolisthesis of L4 upon L5 and S1 in relation to L5  There is mild levoscoliosis of the lower lumbar spine  No compression fracture      Mild endplate marrow degenerative change is noted at L4-5 on the right, Modic type one  There is Modic type II fatty marrow change noted within the L5-S1 endplates to the right      SACRUM:  Normal signal within the sacrum  No evidence of insufficiency or stress fracture      DISTAL CORD AND CONUS:  Normal size and signal within the distal cord and conus      PARASPINAL SOFT TISSUES:  There is a mildly complex hyperintense exophytic cystic mass arising from the lateral midportion of the right kidney seen on coronal imaging only  This measures 2 2 cm in transverse diameter with some internal signal changes,   possibly representing septations      LOWER THORACIC DISC SPACES:  Normal disc height and signal   No disc herniation, canal stenosis or foraminal narrowing      LUMBAR DISC SPACES:     L1-L2:  Small right foraminal disc herniation with associated annular fissure  No canal stenosis  Minimal right foraminal narrowing without nerve impingement      L2-L3:  Mild annular bulging  No focal disc herniation, canal stenosis or foraminal nerve impingement      L3-L4:  Diffuse annular bulging with small broad-based foraminal disc herniations, left larger than right    Mild canal stenosis and mild to moderate bilateral foraminal narrowing      L4-L5:  Grade 1 anterior spondylolisthesis with disc desiccation and loss of disc height  Moderate annular bulging and facet degenerative change  There is a small right foraminal disc protrusion  There is mild central canal stenosis  Mild left but   moderate right foraminal narrowing with disc material abutting and slightly superiorly displacing the exiting nerve      L5-S1:  Disc desiccation and loss of disc height  Diffuse annular bulging with a lobulated, heterogeneous central disc extrusion or possibly sequestration within the anterior epidural space abutting the ventral aspect of the thecal sac and the left S1   nerve  Mild bilateral foraminal narrowing      IMPRESSION:     Inferiorly extruded or possibly sequestered disc herniation at the L5-S1 level partially effacing the ventral epidural space and abutting the thecal sac and left S1 nerve      Degenerative disc disease at L4-5 with annular bulging and a small right foraminal disc protrusion  Moderate right foraminal narrowing with disc material abutting and slightly displacing the exiting nerve    Correlate for right L4 radiculopathy      At L3-4 there is moderate bilateral foraminal narrowing secondary to annular bulging and broad-based foraminal disc protrusions      Stable size of a mildly complex right renal cyst

## 2021-05-25 NOTE — PROGRESS NOTES
Assessment  1  Chronic right-sided low back pain without sciatica    2  Pain in right hip    3  Lumbar degenerative disc disease        Plan  Mr Kris Amador is a pleasant 57-year-old male who presents for initial evaluation regarding low back pain with radiating symptoms into the right worse than left lower extremity  During today's evaluation he is demonstrating clinical and diagnostic evidence of lumbar radiculopathy likely in relation to the multilevel foraminal stenosis as well as degenerative disc disease most notable at L4-L5 and L5-S1 and a central disc extrusion at L5-S1  He has already tried conservative measures with physical therapy, home exercises and meloxicam which is provided minimal relief in his pain  At this time further interventional approaches would be beneficial and warranted  As such we will   1  Plan for a lumbar epidural steroid injection right paramedian approach L5-S1   2  Will start gabapentin 300 mg daily  Patient works night shifts and operates a forklift so I advised him not to take the medication while operating heavy machinery and only take after work for now  3  Robaxin 500 mg twice a day as needed for muscle spasms   4  Complete risks and benefits including bleeding, infection, tissue reaction, nerve injury and allergic reaction were discussed  The approach was demonstrated using models and literature was provided  Verbal and written consent was obtained  My impressions and treatment recommendations were discussed in detail with the patient who verbalized understanding and had no further questions  Discharge instructions were provided  I personally saw and examined the patient and I agree with the above discussed plan of care  No orders of the defined types were placed in this encounter  No orders of the defined types were placed in this encounter        History of Present Illness    Veronica  is a 60 y o  male presents to [place] [address] [facility] for initial evaluation and referral from Dr Earnestine Del Rio regarding lumbar degenerative disc disease and radiculopathy  Patient denies any significant inciting event or recent trauma  Today he reports severe pain rated 9/10 and interfering with daily activities  Pain is constant 100% of the time that is present throughout the day and night  Describes symptoms of burning, numbness, sharp, throbbing, pins [place], pressure-like pain  Also complains of lower extremity weakness but denies falls  Does not use any durable medical equipment for ambulation  Symptoms are worse with standing, bending, walking, exercise  He has had excellent relief from previous injection several years ago and no significant improvements with physical therapy or heat and ice  Currently taking meloxicam for pain which is provided minimal relief  Presents today for initial evaluation  I have personally reviewed and/or updated the patient's past medical history, past surgical history, family history, social history, current medications, allergies, and vital signs today  Review of Systems   Constitutional: Negative for fever and unexpected weight change  HENT: Negative for trouble swallowing  Eyes: Negative for visual disturbance  Respiratory: Negative for shortness of breath and wheezing  Cardiovascular: Negative for chest pain and palpitations  Gastrointestinal: Negative for constipation, diarrhea, nausea and vomiting  Endocrine: Negative for cold intolerance, heat intolerance and polydipsia  Genitourinary: Negative for difficulty urinating and frequency  Musculoskeletal: Positive for back pain, gait problem and joint swelling  Negative for arthralgias and myalgias  Skin: Negative for rash  Neurological: Positive for weakness and numbness  Negative for dizziness, seizures, syncope and headaches  Hematological: Does not bruise/bleed easily  Psychiatric/Behavioral: Negative for dysphoric mood     All other systems reviewed and are negative  Patient Active Problem List   Diagnosis    Kidney stones    Complex renal cyst    Hyperlipidemia    Coronary artery calcification    Chronic right-sided low back pain without sciatica    Pain in right hip    Lumbar degenerative disc disease       Past Medical History:   Diagnosis Date    Acute sinusitis     Allergic rhinitis     Chronic sinusitis     last assessed 10/23/2012    Hyperlipemia     Kidney stone 5/2001       Past Surgical History:   Procedure Laterality Date    CHOLECYSTECTOMY      COLONOSCOPY N/A 1/22/2016    Procedure: COLONOSCOPY;  Surgeon: Anshu Aranda MD;  Location: [facility]; Service:    [address] [place] [address]      In your records    LITHOTRIPSY      In your records    ORTHOPEDIC SURGERY Right 1997    Knee    SHOULDER ARTHROSCOPY Right        Family History   Problem Relation Age of Onset    Nephrolithiasis Mother     Hyperlipidemia Mother     Hypertension Mother     Urolithiasis Mother     Hyperlipidemia Father     Nephrolithiasis Brother        Social History     Occupational History     Employer: [employer]   Tobacco Use    Smoking status: Never Smoker    Smokeless tobacco: Never Used   Substance and Sexual Activity    Alcohol use: Yes     Types: 6 Cans of beer per week     Frequency: 2-4 times a month     Comment: social    Drug use: Never    Sexual activity: Yes     Partners: Female     Birth control/protection: None       Current Outpatient Medications on File Prior to Visit   Medication Sig    Ascorbic Acid (Vitamin C) 500 MG CHEW Chew 1 Dose daily    aspirin 81 MG tablet Take 81 mg by mouth daily   atorvastatin (LIPITOR) 80 mg tablet TAKE 1 TABLET BY MOUTH EVERY DAY    b complex vitamins capsule Take 1 capsule by mouth daily      Cholecalciferol (D3-1000) 25 MCG (1000 UT) capsule Take 2 capsules by mouth daily    Coconut Oil 1000 MG CAPS Take 1 capsule by mouth daily    Loratadine 10 MG CAPS Take 1 capsule by mouth daily    Magnesium 500 MG TABS Take 1 tablet by mouth daily    meloxicam (MOBIC) 15 mg tablet TAKE 1 TABLET BY MOUTH EVERY DAY AS NEEDED FOR PAIN    Multiple Vitamin (MULTIVITAMIN) capsule Take 1 capsule by mouth daily   Omega-3 Fatty Acids (fish oil) 1,000 mg Take 1,000 mg by mouth daily    Turmeric (QC Tumeric Complex) 500 MG CAPS Take 1 capsule by mouth daily    vitamin E, tocopherol, 400 units capsule Take 400 Units by mouth daily   Zinc 50 MG TABS Take 1 tablet by mouth daily     No current facility-administered medications on file prior to visit  No Known Allergies    Physical Exam    Wt 102 kg (225 lb)   BMI 28 89 kg/m²     Constitutional: normal, well developed, well nourished, alert, in no distress and non-toxic and no overt pain behavior  Eyes: anicteric  HEENT: grossly intact  Neck: supple, symmetric, trachea midline and no masses   Pulmonary:even and unlabored  Cardiovascular:No edema or pitting edema present  Skin:Normal without rashes or lesions and well hydrated  Psychiatric:Mood and affect appropriate  Neurologic:Cranial Nerves II-XII grossly intact  Musculoskeletal:antalgic, tenderness to palpation bilateral lumbar paraspinals, decreased active and passive range of motion with lumbar flexion and extension limited by pain, MMT 5/5 bilateral lower extremities, sensation decreased to light touch in patchy distribution right lower extremity, straight leg raise positive for radicular pain in the seated position into the right leg, DTRs within normal limits    Imaging    MRI LUMBAR SPINE WITHOUT CONTRAST     INDICATION: M54 5: Low back pain  G89 29: Other chronic pain  M25 551: Pain in right hip  M51 36:  Other intervertebral disc degeneration, lumbar region      COMPARISON:  None      TECHNIQUE:  Sagittal T1, sagittal T2, sagittal inversion recovery, axial T1 and axial T2, coronal T2     IMAGE QUALITY:  Diagnostic     FINDINGS:     VERTEBRAL BODIES:  There are 5 lumbar type vertebral bodies  Grade 1 anterior spondylolisthesis of L4 upon L5 and S1 in relation to L5  There is mild levoscoliosis of the lower lumbar spine  No compression fracture      Mild endplate marrow degenerative change is noted at L4-5 on the right, Modic type one  There is Modic type II fatty marrow change noted within the L5-S1 endplates to the right      SACRUM:  Normal signal within the sacrum  No evidence of insufficiency or stress fracture      DISTAL CORD AND CONUS:  Normal size and signal within the distal cord and conus      PARASPINAL SOFT TISSUES:  There is a mildly complex hyperintense exophytic cystic mass arising from the lateral midportion of the right kidney seen on coronal imaging only  This measures 2 2 cm in transverse diameter with some internal signal changes,   possibly representing septations      LOWER THORACIC DISC SPACES:  Normal disc height and signal   No disc herniation, canal stenosis or foraminal narrowing      LUMBAR DISC SPACES:     L1-L2:  Small right foraminal disc herniation with associated annular fissure  No canal stenosis  Minimal right foraminal narrowing without nerve impingement      L2-L3:  Mild annular bulging  No focal disc herniation, canal stenosis or foraminal nerve impingement      L3-L4:  Diffuse annular bulging with small broad-based foraminal disc herniations, left larger than right  Mild canal stenosis and mild to moderate bilateral foraminal narrowing      L4-L5:  Grade 1 anterior spondylolisthesis with disc desiccation and loss of disc height  Moderate annular bulging and facet degenerative change  There is a small right foraminal disc protrusion  There is mild central canal stenosis  Mild left but   moderate right foraminal narrowing with disc material abutting and slightly superiorly displacing the exiting nerve      L5-S1:  Disc desiccation and loss of disc height    Diffuse annular bulging with a lobulated, heterogeneous central disc extrusion or possibly sequestration within the anterior epidural space abutting the ventral aspect of the thecal sac and the left S1   nerve  Mild bilateral foraminal narrowing      IMPRESSION:     Inferiorly extruded or possibly sequestered disc herniation at the L5-S1 level partially effacing the ventral epidural space and abutting the thecal sac and left S1 nerve      Degenerative disc disease at L4-5 with annular bulging and a small right foraminal disc protrusion  Moderate right foraminal narrowing with disc material abutting and slightly displacing the exiting nerve    Correlate for right L4 radiculopathy      At L3-4 there is moderate bilateral foraminal narrowing secondary to annular bulging and broad-based foraminal disc protrusions      Stable size of a mildly complex right renal cyst

## 2021-05-25 NOTE — PATIENT INSTRUCTIONS
Lumbar Radiculopathy   WHAT YOU NEED TO KNOW:   Lumbar radiculopathy is a painful condition that happens when a nerve in your lumbar spine (lower back) is pinched or irritated  Nerves control feeling and movement in your body  You may have numbness or pain that shoots down from your lower back towards your foot  DISCHARGE INSTRUCTIONS:   Medicines:   · Medicines:     ? NSAIDs , such as ibuprofen, help decrease swelling, pain, and fever  This medicine is available with or without a doctor's order  NSAIDs can cause stomach bleeding or kidney problems in certain people  If you take blood thinner medicine, always ask your healthcare provider if NSAIDs are safe for you  Always read the medicine label and follow directions  ? Muscle relaxers  help decrease pain and muscle spasms  ? Opioids: This is a strong medicine given to reduce severe pain  It is also called narcotic pain medicine  Take this medicine exactly as directed by your healthcare provider  ? Oral steroids: Steroids may also be given to reduce pain and swelling  ? Take your medicine as directed  Contact your healthcare provider if you think your medicine is not helping or if you have side effects  Tell him of her if you are allergic to any medicine  Keep a list of the medicines, vitamins, and herbs you take  Include the amounts, and when and why you take them  Bring the list or the pill bottles to follow-up visits  Carry your medicine list with you in case of an emergency  Follow up with your healthcare provider or spine specialist within 1 to 3 weeks:  After your first follow-up appointment, return to your healthcare provider or spine specialist every 2 weeks until you have healed  Ask for information about physical therapy for your condition  Write down your questions so you remember to ask them during your visits  Physical therapy:  You may need physical therapy to improve your condition   Your physical therapist may teach you certain exercises to improve posture (the way you stand and sit), flexibility, and strength in your lower back  Self care:   · Stay active: It is best to be active when you have lumbar radiculopathy  Your physical therapist or healthcare provider may tell you to take walks to ease yourself back into your daily routine  Avoid long periods of bed rest  Bed rest could worsen your symptoms  Do not move in ways that increase your pain  Ask for more information about the best ways to stay active  · Use ice or heat packs:  Use ice or heat packs as directed on the sore area of your body to decrease the pain and swelling  Put ice in a plastic bag covered with a towel on your low back  Cover heated items with a towel to avoid burns  Use ice and heat as directed  · Avoid heavy lifting: Your condition may worsen if you lift heavy things  Avoid lifting if possible  · Maintain a healthy weight:  Excess body weight may strain your back  Talk with your healthcare provider about ways to lose excess weight if you are overweight  Contact your healthcare provider or spine specialist if:   · Your pain does not improve within 1 to 3 weeks after treatment  · Your pain and weakness keep you from your normal activities at work, home, or school  · You lose more than 10 pounds in 6 months without trying  · You become depressed or sad because of the pain  · You have questions or concerns about your condition or care  Return to the emergency department if:   · You have a fever greater than 100 4°F for longer than 2 days  · You have new, severe back or leg pain, or your pain spreads to both legs  · You have any new signs of numbness or weakness, especially in your lower back, legs, arms, or genital area  · You have new trouble controlling your urine and bowel movements  · You do not feel like your bladder empties when you urinate      © Copyright [employer] 2020 Information is for End User's use only and may not be sold, redistributed or otherwise used for commercial purposes  All illustrations and images included in CareNotes® are the copyrighted property of A D A M , Inc  or Janelle Peraza  The above information is an  only  It is not intended as medical advice for individual conditions or treatments  Talk to your doctor, nurse or pharmacist before following any medical regimen to see if it is safe and effective for you

## 2021-05-26 ENCOUNTER — TELEPHONE (OUTPATIENT)
Dept: PAIN MEDICINE | Facility: CLINIC | Age: 56
End: 2021-05-26

## 2021-06-22 NOTE — TELEPHONE ENCOUNTER
Patient is requesting to reschedule his procedure scheduled on 6/30/21, due to work schedule conflict   Please advise, caitie    Call back# [phone]

## 2021-09-07 DIAGNOSIS — E78.01 FAMILIAL HYPERCHOLESTEROLEMIA: ICD-10-CM

## 2021-09-07 RX ORDER — ATORVASTATIN CALCIUM 80 MG/1
80 TABLET, FILM COATED ORAL DAILY
Qty: 90 TABLET | Refills: 3 | Status: SHIPPED | OUTPATIENT
Start: 2021-09-07

## 2021-09-15 NOTE — TELEPHONE ENCOUNTER
Rescheduled pt for L5 S1 LESi for 9/27/21  Pt denies rx blood thinners  Went over pre-procedure instructions below:  Nothing to eat or drink 1 hr prior to procedure  Need to arrange transportation  Proper clothing for procedure  If ill or placed on antibiotics please call to reschedule  Covid/travel/ and vaccine instructions  Pt had flu shot on 9/5/21

## 2021-09-27 ENCOUNTER — HOSPITAL ENCOUNTER (OUTPATIENT)
Dept: RADIOLOGY | Facility: CLINIC | Age: 56
Discharge: HOME | End: 2021-09-27
Attending: PHYSICAL MEDICINE & REHABILITATION | Admitting: PHYSICAL MEDICINE & REHABILITATION
Payer: COMMERCIAL

## 2021-09-27 VITALS
TEMPERATURE: 97.3 F | HEART RATE: 84 BPM | RESPIRATION RATE: 18 BRPM | OXYGEN SATURATION: 97 % | SYSTOLIC BLOOD PRESSURE: 144 MMHG | DIASTOLIC BLOOD PRESSURE: 86 MMHG

## 2021-09-27 DIAGNOSIS — M51.36 LUMBAR DEGENERATIVE DISC DISEASE: ICD-10-CM

## 2021-09-27 DIAGNOSIS — M51.26 LUMBAR DISC HERNIATION: ICD-10-CM

## 2021-09-27 DIAGNOSIS — G89.29 CHRONIC RIGHT-SIDED LOW BACK PAIN WITHOUT SCIATICA: ICD-10-CM

## 2021-09-27 DIAGNOSIS — M54.50 CHRONIC RIGHT-SIDED LOW BACK PAIN WITHOUT SCIATICA: ICD-10-CM

## 2021-09-27 DIAGNOSIS — M25.551 PAIN IN RIGHT HIP: ICD-10-CM

## 2021-09-27 DIAGNOSIS — M51.16 LUMBAR DISC DISEASE WITH RADICULOPATHY: ICD-10-CM

## 2021-09-27 PROCEDURE — 62323 NJX INTERLAMINAR LMBR/SAC: CPT | Performed by: PHYSICAL MEDICINE & REHABILITATION

## 2021-09-27 RX ORDER — METHYLPREDNISOLONE ACETATE 80 MG/ML
80 INJECTION, SUSPENSION INTRA-ARTICULAR; INTRALESIONAL; INTRAMUSCULAR; PARENTERAL; SOFT TISSUE ONCE
Status: COMPLETED | OUTPATIENT
Start: 2021-09-27 | End: 2021-09-27

## 2021-09-27 RX ADMIN — METHYLPREDNISOLONE ACETATE 80 MG: 80 INJECTION, SUSPENSION INTRA-ARTICULAR; INTRALESIONAL; INTRAMUSCULAR; PARENTERAL; SOFT TISSUE at 15:17

## 2021-09-27 RX ADMIN — IOHEXOL 1 ML: 300 INJECTION, SOLUTION INTRAVENOUS at 15:16

## 2021-10-04 ENCOUNTER — TELEPHONE (OUTPATIENT)
Dept: PAIN MEDICINE | Facility: CLINIC | Age: 56
End: 2021-10-04

## 2021-10-12 ENCOUNTER — TELEPHONE (OUTPATIENT)
Dept: PAIN MEDICINE | Facility: CLINIC | Age: 56
End: 2021-10-12

## 2021-11-16 ENCOUNTER — TELEPHONE (OUTPATIENT)
Dept: PAIN MEDICINE | Facility: CLINIC | Age: 56
End: 2021-11-16

## 2021-11-19 ENCOUNTER — TELEPHONE (OUTPATIENT)
Dept: UROLOGY | Facility: AMBULATORY SURGERY CENTER | Age: 56
End: 2021-11-19

## 2022-01-19 ENCOUNTER — TELEPHONE (OUTPATIENT)
Dept: FAMILY MEDICINE CLINIC | Facility: CLINIC | Age: 57
End: 2022-01-19

## 2022-01-19 ENCOUNTER — TELEPHONE (OUTPATIENT)
Dept: UROLOGY | Facility: AMBULATORY SURGERY CENTER | Age: 57
End: 2022-01-19

## 2022-01-19 DIAGNOSIS — N20.0 KIDNEY STONES: Primary | ICD-10-CM

## 2022-01-19 NOTE — TELEPHONE ENCOUNTER
Patient would like for us to put in his yearly labs so he can go and get those done plus labs from another provider at the same time   Please call when in so he knows

## 2022-01-20 PROBLEM — M25.551 PAIN IN RIGHT HIP: Status: RESOLVED | Noted: 2021-03-11 | Resolved: 2022-01-20

## 2022-02-01 ENCOUNTER — OFFICE VISIT (OUTPATIENT)
Dept: PAIN MEDICINE | Facility: CLINIC | Age: 57
End: 2022-02-01
Payer: COMMERCIAL

## 2022-02-01 VITALS
DIASTOLIC BLOOD PRESSURE: 80 MMHG | SYSTOLIC BLOOD PRESSURE: 120 MMHG | HEART RATE: 87 BPM | HEIGHT: 74 IN | WEIGHT: 228 LBS | BODY MASS INDEX: 29.26 KG/M2

## 2022-02-01 DIAGNOSIS — G89.29 CHRONIC RIGHT-SIDED LOW BACK PAIN WITHOUT SCIATICA: ICD-10-CM

## 2022-02-01 DIAGNOSIS — M54.50 CHRONIC RIGHT-SIDED LOW BACK PAIN WITHOUT SCIATICA: ICD-10-CM

## 2022-02-01 DIAGNOSIS — G89.4 CHRONIC PAIN SYNDROME: Primary | ICD-10-CM

## 2022-02-01 DIAGNOSIS — M51.26 LUMBAR DISC HERNIATION: ICD-10-CM

## 2022-02-01 DIAGNOSIS — M51.36 LUMBAR DEGENERATIVE DISC DISEASE: ICD-10-CM

## 2022-02-01 DIAGNOSIS — M25.551 PAIN IN RIGHT HIP: ICD-10-CM

## 2022-02-01 DIAGNOSIS — M51.16 LUMBAR DISC DISEASE WITH RADICULOPATHY: ICD-10-CM

## 2022-02-01 PROCEDURE — 3008F BODY MASS INDEX DOCD: CPT | Performed by: PHYSICIAN ASSISTANT

## 2022-02-01 PROCEDURE — 99214 OFFICE O/P EST MOD 30 MIN: CPT | Performed by: NURSE PRACTITIONER

## 2022-02-01 RX ORDER — GABAPENTIN 300 MG/1
600 CAPSULE ORAL 3 TIMES DAILY
Qty: 540 CAPSULE | Refills: 0 | Status: SHIPPED | OUTPATIENT
Start: 2022-02-01 | End: 2022-05-02

## 2022-02-01 NOTE — PROGRESS NOTES
Assessment:  1  Chronic pain syndrome    2  Chronic right-sided low back pain without sciatica    3  Lumbar disc herniation    4  Lumbar degenerative disc disease    5  Lumbar disc disease with radiculopathy    6  Pain in right hip        Plan:  The patient was seen in the office today for re-evaluation of his chronic pain secondary to lumbar disc disease with radiculopathy  He was last seen in the office on 2021 and he had an L5-S1 lumbar epidural steroid injection on 2021  Unfortunately, he states that this injection only helped his pain for 1 day  Today patient's pain symptoms are radiating down his right leg in an L4-L5 dermatomal distribution pattern therefore we will do the followin  Schedule right sided L4, L5 transforaminal epidural steroid injection with fluoroscopy guidance  2  Increase gabapentin from 300 mg 3 times a day to 600 mg 3 times a day  Patient reports no side effects from this medication and titrating schedule was provided for him and he verbalized understanding  [facility]  Complete risks and benefits including bleeding, infection, tissue reaction, nerve injury and allergic reaction were discussed  The approach was demonstrated using models and literature was provided  Verbal and written consent was obtained  My impressions and treatment recommendations were discussed in detail with the patient who verbalized understanding and had no further questions  Discharge instructions were provided  I personally saw and examined the patient and I agree with the above discussed plan of care  No orders of the defined types were placed in this encounter      New Medications Ordered This Visit   Medications    gabapentin (NEURONTIN) 300 mg capsule     Sig: Take 2 capsules (600 mg total) by mouth 3 (three) times a day     Dispense:  540 capsule     Refill:  0       History of Present Illness:  Ronald Elaine is a 60 y o  male who presents for a follow up office visit in regards to Leg Pain (right) and Back Pain  The patients current symptoms include a pain score of 7/10 that is constant  He describes the quality of his pain as dull aching, throbbing and pressure-like  He states that the pain radiates down the side and front of his leg  I have personally reviewed and/or updated the patient's past medical history, past surgical history, family history, social history, current medications, allergies, and vital signs today  Review of Systems   Musculoskeletal: Positive for back pain and gait problem  RLE pain       Patient Active Problem List   Diagnosis    Kidney stones    Complex renal cyst    Hyperlipidemia    Coronary artery calcification    Chronic right-sided low back pain without sciatica    Lumbar degenerative disc disease    Lumbar disc herniation    Lumbar disc disease with radiculopathy       Past Medical History:   Diagnosis Date    Acute sinusitis     Allergic rhinitis     Chronic sinusitis     last assessed 10/23/2012    Hyperlipemia     Kidney stone 5/2001       Past Surgical History:   Procedure Laterality Date    CHOLECYSTECTOMY      COLONOSCOPY N/A 1/22/2016    Procedure: COLONOSCOPY;  Surgeon: Esmer Giang MD;  Location: [facility];   Service:    [address] [place] [address]      In your records    LITHOTRIPSY      In your records    ORTHOPEDIC SURGERY Right 1997    Knee    SHOULDER ARTHROSCOPY Right        Family History   Problem Relation Age of Onset    Nephrolithiasis Mother     Hyperlipidemia Mother     Hypertension Mother     Urolithiasis Mother     Hyperlipidemia Father     Nephrolithiasis Brother     Multiple sclerosis Sister        Social History     Occupational History     Employer: [employer]   Tobacco Use    Smoking status: Never Smoker    Smokeless tobacco: Never Used   Vaping Use    Vaping Use: Never used   Substance and Sexual Activity    Alcohol use: Yes     Types: 6 Cans of beer per week     Comment: social    Drug use: Never    Sexual activity: Yes     Partners: Female     Birth control/protection: None       Current Outpatient Medications on File Prior to Visit   Medication Sig    Ascorbic Acid (Vitamin C) 500 MG CHEW Chew 1 Dose daily    aspirin 81 MG tablet Take 81 mg by mouth daily   atorvastatin (LIPITOR) 80 mg tablet Take 1 tablet (80 mg total) by mouth daily    b complex vitamins capsule Take 1 capsule by mouth daily   Cholecalciferol (D3-1000) 25 MCG (1000 UT) capsule Take 2 capsules by mouth daily    Coconut Oil 1000 MG CAPS Take 1 capsule by mouth daily    Loratadine 10 MG CAPS Take 1 capsule by mouth daily    Magnesium 500 MG TABS Take 1 tablet by mouth daily    Multiple Vitamin (MULTIVITAMIN) capsule Take 1 capsule by mouth daily   Omega-3 Fatty Acids (fish oil) 1,000 mg Take 1,000 mg by mouth daily    Turmeric (QC Tumeric Complex) 500 MG CAPS Take 1 capsule by mouth daily    vitamin E, tocopherol, 400 units capsule Take 400 Units by mouth daily   Zinc 50 MG TABS Take 1 tablet by mouth daily    [DISCONTINUED] gabapentin (NEURONTIN) 300 mg capsule Take 1 capsule (300 mg total) by mouth 3 (three) times a day    meloxicam (MOBIC) 15 mg tablet TAKE 1 TABLET BY MOUTH EVERY DAY AS NEEDED FOR PAIN    methocarbamol (ROBAXIN) 500 mg tablet Take 1 tablet (500 mg total) by mouth 2 (two) times a day as needed for muscle spasms     No current facility-administered medications on file prior to visit  No Known Allergies    Physical Exam:    /80 (BP Location: Left arm, Patient Position: Sitting, Cuff Size: Adult)   Pulse 87   Ht 6' 2" (1 88 m)   Wt 103 kg (228 lb)   BMI 29 27 kg/m²     Constitutional:normal, well developed, well nourished, alert, in no distress and non-toxic and no overt pain behavior    Eyes:anicteric  HEENT:grossly intact  Neck:supple, symmetric, trachea midline and no masses   Pulmonary:even and unlabored  Cardiovascular:No edema or pitting edema present  Skin:Normal without rashes or lesions and well hydrated  Psychiatric:Mood and affect appropriate  Neurologic:Cranial Nerves II-XII grossly intact  Musculoskeletal:antalgic    Imaging

## 2022-02-05 ENCOUNTER — APPOINTMENT (OUTPATIENT)
Dept: LAB | Facility: MEDICAL CENTER | Age: 57
End: 2022-02-05
Payer: COMMERCIAL

## 2022-02-05 ENCOUNTER — APPOINTMENT (OUTPATIENT)
Dept: RADIOLOGY | Facility: MEDICAL CENTER | Age: 57
End: 2022-02-05
Payer: COMMERCIAL

## 2022-02-05 DIAGNOSIS — Z12.5 SCREENING FOR PROSTATE CANCER: ICD-10-CM

## 2022-02-05 DIAGNOSIS — N20.0 KIDNEY STONES: ICD-10-CM

## 2022-02-05 LAB — PSA SERPL-MCNC: 0.6 NG/ML (ref 0–4)

## 2022-02-05 PROCEDURE — 74018 RADEX ABDOMEN 1 VIEW: CPT

## 2022-02-05 PROCEDURE — G0103 PSA SCREENING: HCPCS

## 2022-02-24 ENCOUNTER — TELEMEDICINE (OUTPATIENT)
Dept: FAMILY MEDICINE CLINIC | Facility: CLINIC | Age: 57
End: 2022-02-24
Payer: COMMERCIAL

## 2022-02-24 DIAGNOSIS — K52.9 GASTROENTERITIS: Primary | ICD-10-CM

## 2022-02-24 PROCEDURE — 1036F TOBACCO NON-USER: CPT | Performed by: PHYSICIAN ASSISTANT

## 2022-02-24 PROCEDURE — 99213 OFFICE O/P EST LOW 20 MIN: CPT | Performed by: PHYSICIAN ASSISTANT

## 2022-02-24 RX ORDER — ONDANSETRON 4 MG/1
4 TABLET, ORALLY DISINTEGRATING ORAL EVERY 6 HOURS PRN
Qty: 20 TABLET | Refills: 0 | Status: SHIPPED | OUTPATIENT
Start: 2022-02-24 | End: 2022-03-22 | Stop reason: ALTCHOICE

## 2022-02-24 NOTE — PROGRESS NOTES
COVID-19 Outpatient Progress Note    Assessment/Plan:    Problem List Items Addressed This Visit     None      Visit Diagnoses     Gastroenteritis    -  Primary    Relevant Medications    ondansetron (Zofran ODT) 4 mg disintegrating tablet         Disposition:     I recommended the patient to come to our office to perform rapid antigen testing for COVID-19  Recommended patient to come to the office to test for COVID-19  Ordered Zofran for nausea    I have spent 17 minutes directly with the patient  Greater than 50% of this time was spent in counseling/coordination of care regarding: prognosis  Encounter provider Lindsey Robles PA-C    Provider located at [address]  [phone]    Recent Visits  No visits were found meeting these conditions  Showing recent visits within past 7 days and meeting all other requirements  Future Appointments  No visits were found meeting these conditions  Showing future appointments within next 150 days and meeting all other requirements     This virtual check-in was done via [employer] and patient was informed that this is a secure, HIPAA-compliant platform  He agrees to proceed  Patient agrees to participate in a virtual check in via telephone or video visit instead of presenting to the office to address urgent/immediate medical needs  Patient is aware this is a billable service  After connecting through [facility], the patient was identified by name and date of birth  Paula Banegas was informed that this was a telemedicine visit and that the exam was being conducted confidentially over secure lines  My office door was closed  No one else was in the room  Paula Banegas acknowledged consent and understanding of privacy and security of the telemedicine visit   I informed the patient that I have reviewed his record in Epic and presented the opportunity for him to ask any questions regarding the visit today  The patient agreed to participate  Verification of patient location:  Patient is located in the following state in which I hold an active license: [place]    Subjective:   Rosa Maria Toussaint is a 60 y o  male who is concerned about COVID-19  Patient's symptoms include fever (99 9), chills, fatigue, cough, abdominal pain, nausea, diarrhea (watery), myalgias and headache  Patient denies shortness of breath, chest tightness and vomiting (one episode)  - Date of symptom onset: 2/22/2022      COVID-19 vaccination status: Fully vaccinated (primary series)    Exposure:     Hospitalized recently for fever and/or lower respiratory symptoms?: No      Currently a healthcare worker that is involved in direct patient care?: No      Works in a special setting where the risk of COVID-19 transmission may be high? (this may include long-term care, correctional and assisted facilities; homeless shelters; assisted-living facilities and group homes ): No      Resident in a special setting where the risk of COVID-19 transmission may be high? (this may include long-term care, correctional and assisted facilities; homeless shelters; assisted-living facilities and group homes ): No      Ate bread and orange juice this morning  Trying to drink water  Denies any urinary discomfort    Taking Imodium, Pepto   Tums gave him Knot in stomach  Wife has no symptoms  Co-workers had       No results found for: [facility], [address] [facility], [address] [facility] [address], [facility], [facility]  Past Medical History:   Diagnosis Date    Acute sinusitis     Allergic rhinitis     Chronic sinusitis     last assessed 10/23/2012    Hyperlipemia     Kidney stone 5/2001     Past Surgical History:   Procedure Laterality Date    CHOLECYSTECTOMY      COLONOSCOPY N/A 1/22/2016    Procedure: COLONOSCOPY;  Surgeon: Ana Greene MD;  [facility];   Service:    [address] [place] [address]      In your records    LITHOTRIPSY      In your records    ORTHOPEDIC SURGERY Right 1997    Knee    SHOULDER ARTHROSCOPY Right      Current Outpatient Medications   Medication Sig Dispense Refill    Ascorbic Acid (Vitamin C) 500 MG CHEW Chew 1 Dose daily      aspirin 81 MG tablet Take 81 mg by mouth daily   atorvastatin (LIPITOR) 80 mg tablet Take 1 tablet (80 mg total) by mouth daily 90 tablet 3    b complex vitamins capsule Take 1 capsule by mouth daily   Cholecalciferol (D3-1000) 25 MCG (1000 UT) capsule Take 2 capsules by mouth daily      Coconut Oil 1000 MG CAPS Take 1 capsule by mouth daily      gabapentin (NEURONTIN) 300 mg capsule Take 2 capsules (600 mg total) by mouth 3 (three) times a day 540 capsule 0    Loratadine 10 MG CAPS Take 1 capsule by mouth daily      Magnesium 500 MG TABS Take 1 tablet by mouth daily      Multiple Vitamin (MULTIVITAMIN) capsule Take 1 capsule by mouth daily   Omega-3 Fatty Acids (fish oil) 1,000 mg Take 1,000 mg by mouth daily      ondansetron (Zofran ODT) 4 mg disintegrating tablet Take 1 tablet (4 mg total) by mouth every 6 (six) hours as needed for nausea or vomiting 20 tablet 0    Turmeric (QC Tumeric Complex) 500 MG CAPS Take 1 capsule by mouth daily      vitamin E, tocopherol, 400 units capsule Take 400 Units by mouth daily   Zinc 50 MG TABS Take 1 tablet by mouth daily       No current facility-administered medications for this visit  No Known Allergies    Review of Systems   Constitutional: Positive for chills, fatigue and fever (99 9)  Respiratory: Positive for cough  Negative for chest tightness and shortness of breath  Gastrointestinal: Positive for abdominal pain, diarrhea (watery) and nausea  Negative for anal bleeding, blood in stool, constipation and vomiting (one episode)  Musculoskeletal: Positive for myalgias  Neurological: Positive for headaches  Objective: There were no vitals filed for this visit      Physical Exam    VIRTUAL VISIT DISCLAIMER    Sandro Ty verbally agrees to participate in [employer]  Pt is aware that [employer] could be limited without vital signs or the ability to perform a full hands-on physical Percy Morataya understands he or the provider may request at any time to terminate the video visit and request the patient to seek care or treatment in person

## 2022-02-25 ENCOUNTER — PATIENT MESSAGE (OUTPATIENT)
Dept: FAMILY MEDICINE CLINIC | Facility: CLINIC | Age: 57
End: 2022-02-25

## 2022-03-08 ENCOUNTER — OFFICE VISIT (OUTPATIENT)
Dept: UROLOGY | Facility: AMBULATORY SURGERY CENTER | Age: 57
End: 2022-03-08
Payer: COMMERCIAL

## 2022-03-08 VITALS
DIASTOLIC BLOOD PRESSURE: 74 MMHG | WEIGHT: 226 LBS | BODY MASS INDEX: 29 KG/M2 | HEIGHT: 74 IN | SYSTOLIC BLOOD PRESSURE: 102 MMHG | HEART RATE: 87 BPM

## 2022-03-08 DIAGNOSIS — N20.0 NEPHROLITHIASIS: Primary | ICD-10-CM

## 2022-03-08 PROCEDURE — 99214 OFFICE O/P EST MOD 30 MIN: CPT | Performed by: NURSE PRACTITIONER

## 2022-03-08 NOTE — PROGRESS NOTES
3/8/2022      Chief Complaint   Patient presents with    Follow-up    Nephrolithiasis     Assessment and Plan    60 y o  male managed by Dr Charity Oseguera    1  Nephrolithiasis  · KUB performed 02/05/2022 reveals a 13 millimeter bladder calculus  · Will discuss with Dr Charity Oseguera whether patient is a candidate just for cystolitholapaxy or consider TURP  Although, patient is not noted to have an enlarged prostate (see below)  · After discussion, will discussed surgical options with patient  Patient is requesting if there is a need to repeat cystoscopy he wishes stat to be performed under sedation due to the amount of discomfort he experienced after his prior cystoscopy  · Patient will follow-up in the office based off of discussion of case with attending physician    2  Prostate cancer screening  · PSA performed 02/05/2022 resulted 0 6  · EVA-prostate approximate 40 g with no nodules noted  · Repeat PSA and EVA in 1 year    History of Present Illness  Ronald Elaine is a 60 y o  male here for follow up evaluation of nephrolithiasis  Patient is also here for routine prostate cancer screening  Patient with a history of bladder calculi noted on CT scan 1 year ago  At that time, he was noted to have to calculi, 8 mm and 9 mm  On evaluation the office today patient denies all lower urinary tract symptoms with the exception of an occasional split urinary stream   He denies recurrence of urinary tract infections  He denies suprapubic abdominal pain and flank pain  Denies fever and chills  Component PSA, Total   Latest Ref Rng & Units 0 0 - 4 0 ng/mL   8/17/2017 0 3   1/4/2019 0 5   3/13/2021 0 4   2/5/2022 0 6     Review of Systems   Constitutional: Negative for chills and fever  Respiratory: Negative for cough and shortness of breath  Cardiovascular: Negative for chest pain  Gastrointestinal: Negative for abdominal distention, abdominal pain, blood in stool, nausea and vomiting     Genitourinary: Negative for difficulty urinating, dysuria, enuresis, flank pain, frequency, hematuria and urgency  Skin: Negative for rash  AUA SYMPTOM SCORE      Most Recent Value   AUA SYMPTOM SCORE    How often have you had a sensation of not emptying your bladder completely after you finished urinating? 1 (P)     How often have you had to urinate again less than two hours after you finished urinating? 1 (P)     How often have you found you stopped and started again several times when you urinate? 0 (P)     How often have you found it difficult to postpone urination? 1 (P)     How often have you had a weak urinary stream? 2 (P)     How often have you had to push or strain to begin urination? 0 (P)     How many times did you most typically get up to urinate from the time you went to bed at night until the time you got up in the morning? 1 (P)     Quality of Life: If you were to spend the rest of your life with your urinary condition just the way it is now, how would you feel about that? 2 (P)     AUA SYMPTOM SCORE 6 (P)              Past Medical History  Past Medical History:   Diagnosis Date    Acute sinusitis     Allergic rhinitis     Chronic sinusitis     last assessed 10/23/2012    Hyperlipemia     Kidney stone 5/2001       Past Social History  Past Surgical History:   Procedure Laterality Date    CHOLECYSTECTOMY      COLONOSCOPY N/A 1/22/2016    Procedure: COLONOSCOPY;  Surgeon: Ana Greene MD;  [facility];   Service:    [address] [place] [address]      KIDNEY STONE SURGERY      In your records    LITHOTRIPSY      In your records    ORTHOPEDIC SURGERY Right 1997    Knee    SHOULDER ARTHROSCOPY Right      Social History     Tobacco Use   Smoking Status Never Smoker   Smokeless Tobacco Never Used       Past Family History  Family History   Problem Relation Age of Onset    Nephrolithiasis Mother     Hyperlipidemia Mother     Hypertension Mother     Urolithiasis Mother     Hyperlipidemia Father  Nephrolithiasis Brother     Multiple sclerosis Sister        Past Social history  Social History     Socioeconomic History    Marital status: /Civil Union     Spouse name: Not on file    Number of children: Not on file    Years of education: Not on file    Highest education level: Not on file   Occupational History     Employer: [employer]   Tobacco Use    Smoking status: Never Smoker    Smokeless tobacco: Never Used   Vaping Use    Vaping Use: Never used   Substance and Sexual Activity    Alcohol use: Yes     Types: 6 Cans of beer per week     Comment: social    Drug use: Never    Sexual activity: Yes     Partners: Female     Birth control/protection: None   Other Topics Concern    Not on file   Social History Narrative    Not on file     Social Determinants of Health     Financial Resource Strain: Not on file   Food Insecurity: Not on file   Transportation Needs: Not on file   Physical Activity: Not on file   Stress: Not on file   Social Connections: Not on file   Intimate Partner Violence: Not on file   Housing Stability: Not on file       Current Medications  Current Outpatient Medications   Medication Sig Dispense Refill    Ascorbic Acid (Vitamin C) 500 MG CHEW Chew 1 Dose daily      aspirin 81 MG tablet Take 81 mg by mouth daily   atorvastatin (LIPITOR) 80 mg tablet Take 1 tablet (80 mg total) by mouth daily 90 tablet 3    b complex vitamins capsule Take 1 capsule by mouth daily   Cholecalciferol (D3-1000) 25 MCG (1000 UT) capsule Take 2 capsules by mouth daily      gabapentin (NEURONTIN) 300 mg capsule Take 2 capsules (600 mg total) by mouth 3 (three) times a day 540 capsule 0    Loratadine 10 MG CAPS Take 1 capsule by mouth daily      Magnesium 500 MG TABS Take 1 tablet by mouth daily      Multiple Vitamin (MULTIVITAMIN) capsule Take 1 capsule by mouth daily        Omega-3 Fatty Acids (fish oil) 1,000 mg Take 1,000 mg by mouth daily      ondansetron (Zofran ODT) 4 mg disintegrating tablet Take 1 tablet (4 mg total) by mouth every 6 (six) hours as needed for nausea or vomiting 20 tablet 0    vitamin E, tocopherol, 400 units capsule Take 400 Units by mouth daily   Zinc 50 MG TABS Take 1 tablet by mouth daily      Coconut Oil 1000 MG CAPS Take 1 capsule by mouth daily      Turmeric (QC Tumeric Complex) 500 MG CAPS Take 1 capsule by mouth daily       No current facility-administered medications for this visit  Allergies  No Known Allergies      The following portions of the patient's history were reviewed and updated as appropriate: allergies, current medications, past medical history, past social history, past surgical history and problem list       Vitals  Vitals:    03/08/22 1522   BP: 102/74   Pulse: 87   Weight: 103 kg (226 lb)   Height: 6' 2" (1 88 m)           Physical Exam  Physical Exam  Vitals reviewed  Constitutional:       General: He is not in acute distress  Appearance: Normal appearance  He is normal weight  HENT:      Head: Normocephalic  Pulmonary:      Effort: No respiratory distress  Breath sounds: Normal breath sounds  Genitourinary:     Comments: EVA- 40 g prostate with no nodules  Skin:     General: Skin is warm and dry  Neurological:      General: No focal deficit present  Mental Status: He is alert and oriented to person, place, and time     Psychiatric:         Mood and Affect: Mood normal          Behavior: Behavior normal            Results  No results found for this or any previous visit (from the past 1 hour(s)) ]  Lab Results   Component Value Date    PSA 0 6 02/05/2022    PSA 0 4 03/13/2021    PSA 0 5 01/04/2019     Lab Results   Component Value Date    GLUCOSE 94 11/20/2015    CALCIUM 9 7 02/05/2022     11/20/2015    K 4 8 02/05/2022    CO2 29 02/05/2022     02/05/2022    BUN 20 02/05/2022    CREATININE 1 01 02/05/2022     Lab Results   Component Value Date    WBC 5 92 02/05/2022    HGB 17 0 02/05/2022 HCT 52 3 (H) 02/05/2022    MCV 93 02/05/2022     02/05/2022     ABDOMEN     INDICATION:   N20 0: Calculus of kidney      COMPARISON:  6/21/2020, x-rays lumbar spine hips from 2021      VIEWS:  [facility] supine        FINDINGS:     New 13 mm in size calcification projecting over the right urinary bladder  Additional phleboliths are seen in the pelvis      Bilateral punctate renal calculi are noted mildly increased in number or conspicuity since the previous examination with examples including right lower pole 3 mm calculus in left upper pole somewhat linear shape 5 mm calculus     Nonobstructive bowel gas pattern      No acute osseous abnormality is seen      IMPRESSION:     Bilateral nephrolithiasis  Probable 13 mm bladder calculus  Orders  No orders of the defined types were placed in this encounter        JUAN Blair

## 2022-03-15 ENCOUNTER — TELEPHONE (OUTPATIENT)
Dept: UROLOGY | Facility: AMBULATORY SURGERY CENTER | Age: 57
End: 2022-03-15

## 2022-03-15 NOTE — TELEPHONE ENCOUNTER
Please notify patient that I did have an opportunity to speak with Dr Manav Cason regarding his last office visit dated 03/08/2022  He would like to proceed with cystolitholapaxy which is the procedure we discussed in the office  He does not feel that at this point, there would be any need for the procedure with the prostate  We will just manage the stone in the bladder  OR case sent to surgical coordinator to coordinate

## 2022-03-15 NOTE — TELEPHONE ENCOUNTER
----- Message from Randi Setting, RN sent at 3/15/2022  8:15 AM EDT -----  Regarding: FW: Next Steps    ----- Message -----  From: Jessica Van  Sent: 3/14/2022   7:27 PM EDT  To: [facility]  Subject: Next Steps                                       Theron Finley  Have you had a chance to speak with Dr Smitha Quan about removing my bladder stones?

## 2022-03-16 ENCOUNTER — TELEPHONE (OUTPATIENT)
Dept: UROLOGY | Facility: MEDICAL CENTER | Age: 57
End: 2022-03-16

## 2022-03-16 NOTE — TELEPHONE ENCOUNTER
I spoke to the patient and scheduled his Cystolitholopaxy for 4/13/2022 at [facility] with Dr Graeme Boast     -instructions given verbally and mailed  -Urine C&S   2 weeks prior  -patient will avoid any potentially blood thinning medications 7 days prior  -[employer] - on auth tracker 3/16/2022

## 2022-03-16 NOTE — TELEPHONE ENCOUNTER
I left a voice mail message for the patient to schedule a Cystolitholopaxy order by Seth(and approved by Dr Smitha Quan)  I advised that I have an opening at [facility] with Dr Smitha Quan next Wednesday 3/23/2022 , patient would need to have a Urine C&S today or tomorrow  Or the next opening with Dr Smitha Quan would be 4/13/2022 at [facility]  I asked the patient to call back to confirm a date

## 2022-03-16 NOTE — TELEPHONE ENCOUNTER
Left message per communication form advising patient of AP's note  Advised of OR case has been requested and surgery scheduler will be in contact with him to have this scheduled  Office number provided for any further questions or concerns

## 2022-03-16 NOTE — TELEPHONE ENCOUNTER
Arturo Homans (Self) [phone] (M)     Is requesting a call back from [place] [facility] to   [address] [place] [address]

## 2022-03-22 ENCOUNTER — OFFICE VISIT (OUTPATIENT)
Dept: FAMILY MEDICINE CLINIC | Facility: CLINIC | Age: 57
End: 2022-03-22
Payer: COMMERCIAL

## 2022-03-22 VITALS
TEMPERATURE: 95 F | HEIGHT: 74 IN | SYSTOLIC BLOOD PRESSURE: 112 MMHG | BODY MASS INDEX: 29.26 KG/M2 | HEART RATE: 72 BPM | RESPIRATION RATE: 16 BRPM | WEIGHT: 228 LBS | DIASTOLIC BLOOD PRESSURE: 74 MMHG

## 2022-03-22 DIAGNOSIS — I25.10 CORONARY ARTERY CALCIFICATION: ICD-10-CM

## 2022-03-22 DIAGNOSIS — E78.01 FAMILIAL HYPERCHOLESTEROLEMIA: ICD-10-CM

## 2022-03-22 DIAGNOSIS — N28.1 COMPLEX RENAL CYST: ICD-10-CM

## 2022-03-22 DIAGNOSIS — I25.84 CORONARY ARTERY CALCIFICATION: ICD-10-CM

## 2022-03-22 DIAGNOSIS — M77.12 LATERAL EPICONDYLITIS OF LEFT ELBOW: ICD-10-CM

## 2022-03-22 DIAGNOSIS — Z00.00 WELL ADULT EXAM: Primary | ICD-10-CM

## 2022-03-22 DIAGNOSIS — M47.816 FACET ARTHRITIS OF LUMBAR REGION: ICD-10-CM

## 2022-03-22 DIAGNOSIS — M51.16 LUMBAR DISC DISEASE WITH RADICULOPATHY: ICD-10-CM

## 2022-03-22 DIAGNOSIS — M77.02 MEDIAL EPICONDYLITIS OF LEFT ELBOW: ICD-10-CM

## 2022-03-22 DIAGNOSIS — N20.0 KIDNEY STONES: ICD-10-CM

## 2022-03-22 PROBLEM — M54.41 CHRONIC BILATERAL LOW BACK PAIN WITH RIGHT-SIDED SCIATICA: Status: ACTIVE | Noted: 2021-03-11

## 2022-03-22 PROCEDURE — 1036F TOBACCO NON-USER: CPT | Performed by: FAMILY MEDICINE

## 2022-03-22 PROCEDURE — 3008F BODY MASS INDEX DOCD: CPT | Performed by: FAMILY MEDICINE

## 2022-03-22 PROCEDURE — 99396 PREV VISIT EST AGE 40-64: CPT | Performed by: FAMILY MEDICINE

## 2022-03-22 PROCEDURE — 3725F SCREEN DEPRESSION PERFORMED: CPT | Performed by: FAMILY MEDICINE

## 2022-03-22 NOTE — PROGRESS NOTES
Assessment/Plan:         Diagnoses and all orders for this visit:    Well adult exam    Familial hypercholesterolemia    Coronary artery calcification    Lumbar disc disease with radiculopathy    Facet arthritis of lumbar region    Complex renal cyst    Kidney stones    Medial epicondylitis of left elbow    Lateral epicondylitis of left elbow          Continue with current medications  Consider pain management re evaluation for facet blocks  I discussed treatment options for left elbow tendonitis  OV 1 year     BMI Counseling: Body mass index is 29 27 kg/m²  The BMI is above normal  Nutrition recommendations include reducing portion sizes, decreasing overall calorie intake, consuming healthier snacks, moderation in carbohydrate intake, reducing intake of saturated fat and trans fat and reducing intake of cholesterol  Exercise recommendations include exercising 3-5 times per week  Patient ID: Sandro Ty is a 60 y o  male  60 year male here for wellness exam   Medications reviewed  Hospitalizations/surgeries/SH/FH reviewed see note  Familial hyperlipidemia mixed type on Atorvastatin 80 mg daily and 1 fish oil capsule/day  Prior treatment with Lopid and Fenofibrate  Pre treatment cholesterol 400 range and triglyceride range 600  Lipid profile 02/2022 cholesterol 238  Triglycerides 190  HDL 60    FBS 92   LFTs normal   FBS 90      Recent Results (from the past 1512 hour(s))   CBC and differential    Collection Time: 02/05/22  8:28 AM   Result Value Ref Range    WBC 5 92 4 31 - 10 16 Thousand/uL    RBC 5 63 (H) 3 88 - 5 62 Million/uL    Hemoglobin 17 0 12 0 - 17 0 g/dL    Hematocrit 52 3 (H) 36 5 - 49 3 %    MCV 93 82 - 98 fL    MCH 30 2 26 8 - 34 3 pg    MCHC 32 5 31 4 - 37 4 g/dL    RDW 12 6 11 6 - 15 1 %    MPV 9 7 8 9 - 12 7 fL    Platelets 352 399 - 248 Thousands/uL    nRBC 0 /100 WBCs    Neutrophils Relative 61 43 - 75 %    Immat GRANS % 0 0 - 2 %    Lymphocytes Relative 29 14 - 44 % Monocytes Relative 8 4 - 12 %    Eosinophils Relative 2 0 - 6 %    Basophils Relative 0 0 - 1 %    Neutrophils Absolute 3 56 1 85 - 7 62 Thousands/µL    Immature Grans Absolute 0 01 0 00 - 0 20 Thousand/uL    Lymphocytes Absolute 1 72 0 60 - 4 47 Thousands/µL    Monocytes Absolute 0 50 0 17 - 1 22 Thousand/µL    Eosinophils Absolute 0 11 0 00 - 0 61 Thousand/µL    Basophils Absolute 0 02 0 00 - 0 10 Thousands/µL   Comprehensive metabolic panel    Collection Time: 02/05/22  8:28 AM   Result Value Ref Range    Sodium 139 136 - 145 mmol/L    Potassium 4 8 3 5 - 5 3 mmol/L    Chloride 107 100 - 108 mmol/L    CO2 29 21 - 32 mmol/L    ANION GAP 3 (L) 4 - 13 mmol/L    BUN 20 5 - 25 mg/dL    Creatinine 1 01 0 60 - 1 30 mg/dL    Glucose, Fasting 90 65 - 99 mg/dL    Calcium 9 7 8 3 - 10 1 mg/dL    AST 24 5 - 45 U/L    ALT 57 12 - 78 U/L    Alkaline Phosphatase 83 46 - 116 U/L    Total Protein 7 3 6 4 - 8 2 g/dL    Albumin 4 0 3 5 - 5 0 g/dL    Total Bilirubin 0 74 0 20 - 1 00 mg/dL    eGFR 81 ml/min/1 73sq m   Lipid panel    Collection Time: 02/05/22  8:28 AM   Result Value Ref Range    Cholesterol 238 (H) See Comment mg/dL    Triglycerides 190 (H) See Comment mg/dL    HDL, Direct 60 >=40 mg/dL    LDL Calculated 140 (H) 0 - 100 mg/dL    Non-HDL-Chol (CHOL-HDL) 178 mg/dl   PSA, Total Screen    Collection Time: 02/05/22  8:28 AM   Result Value Ref Range    PSA 0 6 0 0 - 4 0 ng/mL       No Known Allergies       Current Outpatient Medications:     Ascorbic Acid (Vitamin C) 500 MG CHEW, Chew 1 Dose daily, Disp: , Rfl:     aspirin 81 MG tablet, Take 81 mg by mouth daily  , Disp: , Rfl:     atorvastatin (LIPITOR) 80 mg tablet, Take 1 tablet (80 mg total) by mouth daily, Disp: 90 tablet, Rfl: 3    b complex vitamins capsule, Take 1 capsule by mouth daily  , Disp: , Rfl:     Cholecalciferol (D3-1000) 25 MCG (1000 UT) capsule, Take 2 capsules by mouth daily, Disp: , Rfl:     gabapentin (NEURONTIN) 300 mg capsule, Take 2 capsules (600 mg total) by mouth 3 (three) times a day, Disp: 540 capsule, Rfl: 0    Loratadine 10 MG CAPS, Take 1 capsule by mouth daily, Disp: , Rfl:     Magnesium 500 MG TABS, Take 1 tablet by mouth daily, Disp: , Rfl:     Multiple Vitamin (MULTIVITAMIN) capsule, Take 1 capsule by mouth daily  , Disp: , Rfl:     Omega-3 Fatty Acids (fish oil) 1,000 mg, Take 1,000 mg by mouth daily, Disp: , Rfl:     vitamin E, tocopherol, 400 units capsule, Take 400 Units by mouth daily  , Disp: , Rfl:     Zinc 50 MG TABS, Take 1 tablet by mouth daily, Disp: , Rfl:      Social History     Tobacco Use    Smoking status: Never Smoker    Smokeless tobacco: Never Used   Vaping Use    Vaping Use: Never used   Substance Use Topics    Alcohol use: Yes     Types: 6 Cans of beer per week     Comment: social    Drug use: Never     Family History   Problem Relation Age of Onset    Nephrolithiasis Mother     Hyperlipidemia Mother     Hypertension Mother     Urolithiasis Mother     Hyperlipidemia Father     Nephrolithiasis Brother     Multiple sclerosis Sister     Coronary artery disease Maternal Grandmother      Past Surgical History:   Procedure Laterality Date    CHOLECYSTECTOMY      COLONOSCOPY N/A 1/22/2016    Procedure: COLONOSCOPY;  Surgeon: Domo Phillip MD;  Location: [facility]; Service:    [address] [facility]      KIDNEY STONE SURGERY      In your records    LITHOTRIPSY      In your records    ORTHOPEDIC SURGERY Right 1997    Knee    SHOULDER ARTHROSCOPY Right            The following portions of the patient's history were reviewed and updated as appropriate: allergies, current medications, past family history, past medical history, past social history, past surgical history and problem list     Review of Systems   Constitutional: Negative for appetite change, chills, fever and unexpected weight change  HENT: Negative for congestion, ear pain, rhinorrhea, sinus pressure, sore throat and trouble swallowing      Eyes: Negative for visual disturbance  Respiratory: Negative for cough, shortness of breath and wheezing  Cardiovascular: Negative for chest pain, palpitations and leg swelling  Gastrointestinal: Negative for abdominal pain, blood in stool, constipation, diarrhea, nausea and vomiting  Status post cholecystectomy  History of colon polyps  06/2021 colonoscopy normal     Endocrine: Negative for polydipsia and polyuria  Genitourinary: Negative for difficulty urinating  Patient is scheduled for cystoscopy next month for bladder stone  02/2022 KUB Bilateral nephrolithiasis  Probable 13 mm bladder calculus  05/2021 MRI Stable size of a mildly complex right renal cyst  History of kidney stones followed by Urology  Prior surgery and lithotripsy  02/2017 CT scan of abdomen and pelvis bilateral renal cystic lesions  Stable small right hepatic lobe cysts 9 mm  3 mm calculus right mid pole kidney  No hydronephrosis  Left kidney minute calculus fragments visible mid to lower pole  Status post left inguinal herniorrhaphy  Lab Results       Component                Value               Date                       PSA                      0 6                 02/05/2022                 PSA                      0 4                 03/13/2021                 PSA                      0 5                 01/04/2019                           Musculoskeletal: Positive for back pain  Negative for arthralgias and myalgias  Chronic right sided lower back with intermittent R leg pain  No R leg weakness or numbness  05/2021 MRI lumbar spine  Inferiorly extruded or possibly sequestered disc herniation at the L5-S1 level partially effacing the ventral epidural space and abutting the thecal sac and left S1 nerve  Degenerative disc disease at L4-5 with annular bulging and a small right foraminal disc protrusion  Moderate right foraminal narrowing with disc material abutting and slightly displacing the exiting nerve  Correlate for right L4 radiculopathy  At L3-4 there is moderate bilateral foraminal narrowing secondary to annular bulging and broad-based foraminal disc protrusions  09/2021 L5-S1 interlaminar lumbar GERARDO for right lumbar radiculopathy  02/2017 CT scan abdomen and pelvis severe chronic disc degeneration present lumbosacral junction  He completed PT  He is currently on Gabapentin 600 mg 3x/day  Status post bilateral knee arthroscopy  Right shoulder surgery as a teenager  1+ year history of left elbow pain    Skin: Negative for rash  Allergic/Immunologic: Negative for environmental allergies  Neurological: Negative for dizziness, weakness and headaches  Hematological: Negative for adenopathy  Does not bruise/bleed easily  Psychiatric/Behavioral: Negative for dysphoric mood and sleep disturbance  Objective:    /74   Pulse 72   Temp (!) 95 °F (35 °C)   Resp 16   Ht 6' 2" (1 88 m)   Wt 103 kg (228 lb)   BMI 29 27 kg/m²     BP Readings from Last 3 Encounters:   03/22/22 112/74   03/08/22 102/74   02/01/22 120/80     Wt Readings from Last 3 Encounters:   03/22/22 103 kg (228 lb)   03/08/22 103 kg (226 lb)   02/01/22 103 kg (228 lb)          Physical Exam  Vitals and nursing note reviewed  Constitutional:       General: He is not in acute distress  Appearance: He is well-developed  HENT:      Right Ear: Tympanic membrane normal       Left Ear: Tympanic membrane normal    Eyes:      General: No scleral icterus  Extraocular Movements: Extraocular movements intact  Conjunctiva/sclera: Conjunctivae normal       Pupils: Pupils are equal, round, and reactive to light  Neck:      Thyroid: No thyroid mass or thyromegaly  Vascular: No carotid bruit or JVD  Trachea: No tracheal deviation  Cardiovascular:      Rate and Rhythm: Normal rate and regular rhythm  Pulses:           Carotid pulses are 2+ on the right side and 2+ on the left side       Heart sounds: Normal heart sounds  No murmur heard  No gallop  Pulmonary:      Effort: Pulmonary effort is normal  No respiratory distress  Breath sounds: Normal breath sounds  No wheezing or rales  Chest:   Breasts:      Right: No supraclavicular adenopathy  Left: No supraclavicular adenopathy  Abdominal:      General: Bowel sounds are normal  There is no distension or abdominal bruit  Palpations: Abdomen is soft  There is no hepatomegaly, splenomegaly or mass  Tenderness: There is no abdominal tenderness  There is no guarding or rebound  Musculoskeletal:         General: Tenderness present  Left elbow: No swelling or effusion  Normal range of motion  Tenderness present in medial epicondyle and lateral epicondyle  Right lower leg: No edema  Left lower leg: No edema  Comments: No tenderness palpating lumbar spine or SI areas  No lumbar paraspinal tenderness  Lymphadenopathy:      Cervical: No cervical adenopathy  Upper Body:      Right upper body: No supraclavicular adenopathy  Left upper body: No supraclavicular adenopathy  Skin:     Findings: No rash  Nails: There is no clubbing  Neurological:      General: No focal deficit present  Mental Status: He is alert and oriented to person, place, and time  Motor: No weakness        Deep Tendon Reflexes: Reflexes normal       Comments: Negative slump test    Psychiatric:         Mood and Affect: Mood normal          Behavior: Behavior normal

## 2022-03-27 ENCOUNTER — APPOINTMENT (OUTPATIENT)
Dept: LAB | Age: 57
End: 2022-03-27
Payer: COMMERCIAL

## 2022-03-27 DIAGNOSIS — N21.0 BLADDER STONE: ICD-10-CM

## 2022-03-27 PROCEDURE — 87086 URINE CULTURE/COLONY COUNT: CPT

## 2022-03-28 LAB — BACTERIA UR CULT: NORMAL

## 2022-04-07 NOTE — PRE-PROCEDURE INSTRUCTIONS
Pre-Surgery Instructions:   Medication Instructions    Ascorbic Acid (Vitamin C) 500 MG CHEW Instructed patient per Anesthesia Guidelines  Stop 4/7  Do not take morning of surgery    aspirin 81 MG tablet Instructed patient per Anesthesia Guidelines  Stop 4/7  Do not take morning of surgery    atorvastatin (LIPITOR) 80 mg tablet Instructed patient per Anesthesia Guidelines  Takes in evening  Do not take morning of surgery    b complex vitamins capsule Instructed patient per Anesthesia Guidelines  Stop 4/7  Do not take morning of surgery    Cholecalciferol (D3-1000) 25 MCG (1000 UT) capsule Instructed patient per Anesthesia Guidelines  Stop 4/7  Do not take morning of surgery    gabapentin (NEURONTIN) 300 mg capsule Instructed patient per Anesthesia Guidelines  Take morning of surgery with a small sip of water    Loratadine 10 MG CAPS Patient was instructed by Physician and understands   Magnesium 500 MG TABS Instructed patient per Anesthesia Guidelines  Stop 4/7  Do not take morning of surgery    Multiple Vitamin (MULTIVITAMIN) capsule Instructed patient per Anesthesia Guidelines  Stop 4/7  Do not take morning of surgery    Omega-3 Fatty Acids (fish oil) 1,000 mg Instructed patient per Anesthesia Guidelines  Stop 4/7  Do not take morning of surgery    vitamin E, tocopherol, 400 units capsule Instructed patient per Anesthesia Guidelines  Stop 4/7  Do not take morning of surgery    Zinc 50 MG TABS Instructed patient per Anesthesia Guidelines  Stop 4/7  Do not take morning of surgery   Covid screening negative as per patient  Fully vaccinated  Reviewed showering and medication instructions  Patient verbalized understanding  Advised NPO after MN and ASC will call with scheduled surgical time

## 2022-04-08 ENCOUNTER — PROCEDURE VISIT (OUTPATIENT)
Dept: FAMILY MEDICINE CLINIC | Facility: CLINIC | Age: 57
End: 2022-04-08
Payer: COMMERCIAL

## 2022-04-08 DIAGNOSIS — G89.29 CHRONIC BILATERAL LOW BACK PAIN WITHOUT SCIATICA: Primary | ICD-10-CM

## 2022-04-08 DIAGNOSIS — M54.50 CHRONIC BILATERAL LOW BACK PAIN WITHOUT SCIATICA: Primary | ICD-10-CM

## 2022-04-08 PROCEDURE — 97813 ACUP 1/> W/ESTIM 1ST 15 MIN: CPT | Performed by: FAMILY MEDICINE

## 2022-04-08 NOTE — PROGRESS NOTES
Acupuncture visit   Dx chronic lower back pain  No radicular pain  No leg weakness or numbness  No new bowel or bladder changes  He is currently on Gabapentin 300 mg 3x/day and prn Ibuprofen  Prior pain management evaluation  09/2021 L5-S1 interlaminar GERARDO  05/2021 MRI lumbar spine Inferiorly extruded or possibly sequestered disc herniation at the L5-S1 level partially effacing the ventral epidural space and abutting the thecal sac and left S1 nerve  Degenerative disc disease at L4-5 with annular bulging and a small right foraminal disc protrusion  Moderate right foraminal narrowing with disc material abutting and slightly displacing the exiting nerve  Correlate for right L4 radiculopathy  At L3-4 there is moderate bilateral foraminal narrowing secondary to annular bulging and broad-based foraminal disc protrusions  Stable size of a mildly complex right renal cyst       Lumbar PENS    L2 L4  S2 S4  @ 4 HZ x 20 MINUTES  PSIS-BL 54  @ 100 HZ x 20 MINUTES   HEAT LAMP    GV 4    BL 52 5    Left ear Epps Men, Cingulate gyrus     Diagnoses and all orders for this visit:    Chronic bilateral low back pain without sciatica      Follow up visit for acupuncture in 7 to 10 days

## 2022-04-11 ENCOUNTER — ANESTHESIA EVENT (OUTPATIENT)
Dept: PERIOP | Facility: HOSPITAL | Age: 57
End: 2022-04-11
Payer: COMMERCIAL

## 2022-04-13 ENCOUNTER — ANESTHESIA (OUTPATIENT)
Dept: PERIOP | Facility: HOSPITAL | Age: 57
End: 2022-04-13
Payer: COMMERCIAL

## 2022-04-13 ENCOUNTER — APPOINTMENT (OUTPATIENT)
Dept: RADIOLOGY | Facility: HOSPITAL | Age: 57
End: 2022-04-13
Payer: COMMERCIAL

## 2022-04-13 ENCOUNTER — HOSPITAL ENCOUNTER (OUTPATIENT)
Facility: HOSPITAL | Age: 57
Setting detail: OUTPATIENT SURGERY
Discharge: HOME | End: 2022-04-13
Attending: UROLOGY | Admitting: UROLOGY
Payer: COMMERCIAL

## 2022-04-13 VITALS
RESPIRATION RATE: 16 BRPM | DIASTOLIC BLOOD PRESSURE: 82 MMHG | TEMPERATURE: 97.2 F | HEIGHT: 74 IN | OXYGEN SATURATION: 99 % | HEART RATE: 78 BPM | WEIGHT: 229.28 LBS | BODY MASS INDEX: 29.43 KG/M2 | SYSTOLIC BLOOD PRESSURE: 137 MMHG

## 2022-04-13 DIAGNOSIS — N21.0 BLADDER STONE: Primary | ICD-10-CM

## 2022-04-13 PROCEDURE — 52317 REMOVE BLADDER STONE: CPT | Performed by: UROLOGY

## 2022-04-13 PROCEDURE — 82360 CALCULUS ASSAY QUANT: CPT | Performed by: UROLOGY

## 2022-04-13 PROCEDURE — C1758 CATHETER, URETERAL: HCPCS | Performed by: UROLOGY

## 2022-04-13 PROCEDURE — C1769 GUIDE WIRE: HCPCS | Performed by: UROLOGY

## 2022-04-13 RX ORDER — MEPERIDINE HYDROCHLORIDE 25 MG/ML
12.5 INJECTION INTRAMUSCULAR; INTRAVENOUS; SUBCUTANEOUS
Status: DISCONTINUED | OUTPATIENT
Start: 2022-04-13 | End: 2022-04-13 | Stop reason: HOSPADM

## 2022-04-13 RX ORDER — MIDAZOLAM HYDROCHLORIDE 2 MG/2ML
INJECTION, SOLUTION INTRAMUSCULAR; INTRAVENOUS AS NEEDED
Status: DISCONTINUED | OUTPATIENT
Start: 2022-04-13 | End: 2022-04-13

## 2022-04-13 RX ORDER — HYDROCODONE BITARTRATE AND ACETAMINOPHEN 5; 325 MG/1; MG/1
1 TABLET ORAL EVERY 4 HOURS PRN
Qty: 5 TABLET | Refills: 0 | Status: SHIPPED | OUTPATIENT
Start: 2022-04-13 | End: 2022-04-15

## 2022-04-13 RX ORDER — FENTANYL CITRATE/PF 50 MCG/ML
25 SYRINGE (ML) INJECTION
Status: DISCONTINUED | OUTPATIENT
Start: 2022-04-13 | End: 2022-04-13 | Stop reason: HOSPADM

## 2022-04-13 RX ORDER — LIDOCAINE HYDROCHLORIDE 20 MG/ML
INJECTION, SOLUTION EPIDURAL; INFILTRATION; INTRACAUDAL; PERINEURAL AS NEEDED
Status: DISCONTINUED | OUTPATIENT
Start: 2022-04-13 | End: 2022-04-13

## 2022-04-13 RX ORDER — IBUPROFEN 200 MG
TABLET ORAL EVERY 6 HOURS PRN
COMMUNITY

## 2022-04-13 RX ORDER — CEFAZOLIN SODIUM 2 G/50ML
2000 SOLUTION INTRAVENOUS EVERY 8 HOURS
Status: DISCONTINUED | OUTPATIENT
Start: 2022-04-13 | End: 2022-04-13 | Stop reason: HOSPADM

## 2022-04-13 RX ORDER — MAGNESIUM HYDROXIDE 1200 MG/15ML
LIQUID ORAL AS NEEDED
Status: DISCONTINUED | OUTPATIENT
Start: 2022-04-13 | End: 2022-04-13 | Stop reason: HOSPADM

## 2022-04-13 RX ORDER — ONDANSETRON 2 MG/ML
INJECTION INTRAMUSCULAR; INTRAVENOUS AS NEEDED
Status: DISCONTINUED | OUTPATIENT
Start: 2022-04-13 | End: 2022-04-13

## 2022-04-13 RX ORDER — ONDANSETRON 2 MG/ML
4 INJECTION INTRAMUSCULAR; INTRAVENOUS ONCE AS NEEDED
Status: DISCONTINUED | OUTPATIENT
Start: 2022-04-13 | End: 2022-04-13 | Stop reason: HOSPADM

## 2022-04-13 RX ORDER — PROPOFOL 10 MG/ML
INJECTION, EMULSION INTRAVENOUS AS NEEDED
Status: DISCONTINUED | OUTPATIENT
Start: 2022-04-13 | End: 2022-04-13

## 2022-04-13 RX ORDER — HYDROCODONE BITARTRATE AND ACETAMINOPHEN 5; 325 MG/1; MG/1
1 TABLET ORAL EVERY 6 HOURS PRN
Status: DISCONTINUED | OUTPATIENT
Start: 2022-04-13 | End: 2022-04-13 | Stop reason: HOSPADM

## 2022-04-13 RX ORDER — SODIUM CHLORIDE, SODIUM LACTATE, POTASSIUM CHLORIDE, CALCIUM CHLORIDE 600; 310; 30; 20 MG/100ML; MG/100ML; MG/100ML; MG/100ML
125 INJECTION, SOLUTION INTRAVENOUS CONTINUOUS
Status: DISCONTINUED | OUTPATIENT
Start: 2022-04-13 | End: 2022-04-13 | Stop reason: HOSPADM

## 2022-04-13 RX ORDER — DEXAMETHASONE SODIUM PHOSPHATE 10 MG/ML
INJECTION, SOLUTION INTRAMUSCULAR; INTRAVENOUS AS NEEDED
Status: DISCONTINUED | OUTPATIENT
Start: 2022-04-13 | End: 2022-04-13

## 2022-04-13 RX ORDER — FENTANYL CITRATE 50 UG/ML
INJECTION, SOLUTION INTRAMUSCULAR; INTRAVENOUS AS NEEDED
Status: DISCONTINUED | OUTPATIENT
Start: 2022-04-13 | End: 2022-04-13

## 2022-04-13 RX ORDER — CEPHALEXIN 500 MG/1
500 CAPSULE ORAL EVERY 12 HOURS SCHEDULED
Qty: 6 CAPSULE | Refills: 0 | Status: SHIPPED | OUTPATIENT
Start: 2022-04-13 | End: 2022-04-16

## 2022-04-13 RX ADMIN — FENTANYL CITRATE 25 MCG: 50 INJECTION INTRAMUSCULAR; INTRAVENOUS at 11:41

## 2022-04-13 RX ADMIN — MIDAZOLAM 2 MG: 1 INJECTION INTRAMUSCULAR; INTRAVENOUS at 11:25

## 2022-04-13 RX ADMIN — PROPOFOL 200 MG: 10 INJECTION, EMULSION INTRAVENOUS at 11:33

## 2022-04-13 RX ADMIN — FENTANYL CITRATE 25 MCG: 50 INJECTION INTRAMUSCULAR; INTRAVENOUS at 11:33

## 2022-04-13 RX ADMIN — FENTANYL CITRATE 25 MCG: 50 INJECTION INTRAMUSCULAR; INTRAVENOUS at 11:48

## 2022-04-13 RX ADMIN — DEXAMETHASONE SODIUM PHOSPHATE 5 MG: 10 INJECTION INTRAMUSCULAR; INTRAVENOUS at 11:49

## 2022-04-13 RX ADMIN — SODIUM CHLORIDE, SODIUM LACTATE, POTASSIUM CHLORIDE, AND CALCIUM CHLORIDE: .6; .31; .03; .02 INJECTION, SOLUTION INTRAVENOUS at 11:51

## 2022-04-13 RX ADMIN — FENTANYL CITRATE 25 MCG: 50 INJECTION INTRAMUSCULAR; INTRAVENOUS at 11:37

## 2022-04-13 RX ADMIN — CEFAZOLIN SODIUM 2000 MG: 2 SOLUTION INTRAVENOUS at 11:24

## 2022-04-13 RX ADMIN — ONDANSETRON 4 MG: 2 INJECTION INTRAMUSCULAR; INTRAVENOUS at 11:49

## 2022-04-13 RX ADMIN — SODIUM CHLORIDE, SODIUM LACTATE, POTASSIUM CHLORIDE, AND CALCIUM CHLORIDE 125 ML/HR: .6; .31; .03; .02 INJECTION, SOLUTION INTRAVENOUS at 09:50

## 2022-04-13 RX ADMIN — LIDOCAINE HYDROCHLORIDE 100 MG: 20 INJECTION, SOLUTION EPIDURAL; INFILTRATION; INTRACAUDAL; PERINEURAL at 11:33

## 2022-04-13 NOTE — DISCHARGE INSTRUCTIONS
Cystoscopy   WHAT YOU NEED TO KNOW:   A cystoscopy is a procedure to look inside your urethra and bladder using a cystoscope  The procedure is used to diagnose and treat conditions of the bladder, urethra, or prostate  DISCHARGE INSTRUCTIONS:   Seek care immediately if:   · Your urine turns from pink to red, or you have clots in your urine  · You cannot urinate and your bladder feels full  · You have severe pain  Contact your healthcare provider or urologist if:   · Your pain or burning during urination becomes worse or lasts longer than 1 day  · Your urine stays pink for longer than 1 day  · You have a fever and chills  · You urinate less than usual, or still feel like you have to urinate after you use the bathroom  · You have questions or concerns about your condition or care  Medicines: You may  be given any of the following:  · Antibiotics  help treat or prevent a bacterial infection  · Acetaminophen  decreases pain and fever  It is available without a doctor's order  Ask how much to take and how often to take it  Follow directions  Read the labels of all other medicines you are using to see if they also contain acetaminophen, or ask your doctor or pharmacist  Acetaminophen can cause liver damage if not taken correctly  Do not use more than 4 grams (4,000 milligrams) total of acetaminophen in one day  · Take your medicine as directed  Contact your healthcare provider if you think your medicine is not helping or if you have side effects  Tell him or her if you are allergic to any medicine  Keep a list of the medicines, vitamins, and herbs you take  Include the amounts, and when and why you take them  Bring the list or the pill bottles to follow-up visits  Carry your medicine list with you in case of an emergency  Follow up with your healthcare provider as directed:  Write down your questions so you remember to ask them during your visits    Self-care:   · Drink liquids as directed  Your healthcare provider may recommend that you drink 6 to 8 eight-ounce cups of water every day for 2 days after your procedure  · Apply a warm, damp washcloth over your urethral opening  This may help to relieve discomfort  · Ask when you can return to regular daily activities  Your healthcare provider may recommend that you rest after your procedure  · Do not have sex  until your healthcare provider tells you it is okay  Sex may increase your risk for a urinary tract infection  © Copyright [employer] 2022 Information is for End User's use only and may not be sold, redistributed or otherwise used for commercial purposes  All illustrations and images included in CareNotes® are the copyrighted property of A D A M , Inc  or [facility] Shun Lamb   The above information is an  only  It is not intended as medical advice for individual conditions or treatments  Talk to your doctor, nurse or pharmacist before following any medical regimen to see if it is safe and effective for you

## 2022-04-13 NOTE — ANESTHESIA PREPROCEDURE EVALUATION
Procedure:  CYSTO (N/A Bladder)  LITHOLOPAXY LASER (N/A Ureter)    Relevant Problems   CARDIO   (+) Hyperlipidemia      /RENAL   (+) Complex renal cyst   (+) Kidney stones      MUSCULOSKELETAL   (+) Chronic bilateral low back pain with right-sided sciatica   (+) Lumbar degenerative disc disease      NEURO/PSYCH   (+) Chronic bilateral low back pain with right-sided sciatica             Anesthesia Plan  ASA Score- 2     Anesthesia Type- general with ASA Monitors  Additional Monitors:   Airway Plan: ETT and LMA  Plan Factors-Exercise tolerance (METS): >4 METS  Chart reviewed  Existing labs reviewed  Patient summary reviewed  Patient is not a current smoker  Induction- intravenous  Postoperative Plan- Plan for postoperative opioid use  Informed Consent- Anesthetic plan and risks discussed with patient

## 2022-04-13 NOTE — OP NOTE
OPERATIVE REPORT  PATIENT NAME: Angela Dominique    :  1965  MRN: [id number]  Pt Location: [facility] OR ROOM 04    SURGERY DATE: 2022    Surgeon(s) and Role:     * Merry Mcdaniel MD - Primary    Preop Diagnosis:  Bladder stone [N21 0]    Post-Op Diagnosis Codes: * Bladder stone [N21 0]    Procedure(s) (LRB):  CYSTO (N/A)  LITHOLOPAXY LASER (N/A)   GREATER THAN 2 CM STONE    Specimen(s):  * No specimens in log *    Estimated Blood Loss:   Minimal    Drains:  * No LDAs found *    Anesthesia Type:   Choice    Operative Indications:  Bladder stone [N21 0]      Operative Findings:  Two large bladder calculi, completely destroyed and extracted  Complications:   None    Procedure and Technique:  Patient was identified, brought to the operating, and placed on table supine position  After induction general anesthesia he was placed in dorsal lithotomy position and prepped and draped in the usual sterile fashion  A formal time-out was performed  The 25 [place]ian rigid cystoscope was introduced per urethra  There is no urethral abnormality or stricture  Evaluation of prostate does reveal a median lobe compone versus likely causing some degree of bladder outlet obstruction  Evaluation of bladder revealed mild trabeculation diffusely  Ureteral orifices are normal   There are 2 bladder calculi measuring 2 cm in diameter each  Holmium laser fiber was placed and laser litholapaxy was performed until completely destroyed  Stone fragments were irrigated out with [employer]  At the conclusion there was no further stone fragment identified in the bladder  There was no bleeding  Patient tolerated procedure well  No catheter was necessary       I was present for the entire procedure    Patient Disposition:  PACU       SIGNATURE: Merry Mcdaniel MD  DATE: 2022  TIME: 12:03 PM

## 2022-04-13 NOTE — ANESTHESIA POSTPROCEDURE EVALUATION
Post-Op Assessment Note    CV Status:  Stable    Pain management: adequate     Mental Status:  Alert and awake   Hydration Status:  Euvolemic   PONV Controlled:  Controlled   Airway Patency:  Patent      Post Op Vitals Reviewed: Yes      Staff: Anesthesiologist         No complications documented      BP      Temp     Pulse     Resp      SpO2      /82   Pulse 78   Temp (!) 97 2 °F (36 2 °C) (Temporal)   Resp 16   Ht 6' 2" (1 88 m)   Wt 104 kg (229 lb 4 5 oz)   SpO2 99%   BMI 29 44 kg/m²

## 2022-04-14 NOTE — TELEPHONE ENCOUNTER
Post Op Note    uJsta Starr is a 60 y o  male s/p CYSTO (N/A Bladder) LITHOLOPAXY LASER (N/A Ureter) performed 4/13/2022  Justa Starr is a patient of Dr Albania Caballero and is seen at the [facility] office  How would you rate your pain on a scale from 1 to 10, 10 being the worst pain ever? 0    Have your bowel movements been regular? No    Advised patient to get stool softeners to assist with constipation  Patient is understanding  Patient states he is doing well overall  Appointment has been scheduled for a follow up in the [facility] office  Patient is aware of location  Advised he can contact the office in the meantime with any concerns  Patient is agreeable  Do you have any difficulty urinating? No    Do you have any other questions or concerns that I can address at this time?

## 2022-04-14 NOTE — TELEPHONE ENCOUNTER
Patient called to schedule f/u appointment to his surgery yesterday with Dr Charity Oseguera     He has some discomfort, not as bad as yesterday  Frequency has lessen  Urine still light pink, but not clots  Patient can be reached at [phone]

## 2022-04-15 ENCOUNTER — PROCEDURE VISIT (OUTPATIENT)
Dept: FAMILY MEDICINE CLINIC | Facility: CLINIC | Age: 57
End: 2022-04-15
Payer: COMMERCIAL

## 2022-04-15 ENCOUNTER — TELEPHONE (OUTPATIENT)
Dept: UROLOGY | Facility: AMBULATORY SURGERY CENTER | Age: 57
End: 2022-04-15

## 2022-04-15 DIAGNOSIS — R35.1 BENIGN PROSTATIC HYPERPLASIA WITH NOCTURIA: ICD-10-CM

## 2022-04-15 DIAGNOSIS — M54.50 CHRONIC BILATERAL LOW BACK PAIN WITHOUT SCIATICA: Primary | ICD-10-CM

## 2022-04-15 DIAGNOSIS — G89.29 CHRONIC BILATERAL LOW BACK PAIN WITHOUT SCIATICA: Primary | ICD-10-CM

## 2022-04-15 DIAGNOSIS — N40.1 BENIGN PROSTATIC HYPERPLASIA WITH NOCTURIA: ICD-10-CM

## 2022-04-15 PROCEDURE — 97813 ACUP 1/> W/ESTIM 1ST 15 MIN: CPT | Performed by: FAMILY MEDICINE

## 2022-04-15 RX ORDER — TADALAFIL 5 MG/1
5 TABLET ORAL DAILY
Qty: 30 TABLET | Refills: 2 | Status: SHIPPED | OUTPATIENT
Start: 2022-04-15 | End: 2022-04-29 | Stop reason: SDUPTHER

## 2022-04-15 NOTE — PROGRESS NOTES
Acupuncture visit   Dx chronic lower back pain  Patient reports a reduction in his pain level with his 1st treatment  "something changed" No radicular pain  No leg weakness or numbness  No new bowel or bladder changes  He is currently on Gabapentin 300 mg 3x/day and prn Ibuprofen  Prior pain management evaluation  09/2021 L5-S1 interlaminar GERARDO  05/2021 MRI lumbar spine Inferiorly extruded or possibly sequestered disc herniation at the L5-S1 level partially effacing the ventral epidural space and abutting the thecal sac and left S1 nerve  Degenerative disc disease at L4-5 with annular bulging and a small right foraminal disc protrusion   Moderate right foraminal narrowing with disc material abutting and slightly displacing the exiting nerve   Correlate for right L4 radiculopathy  At L3-4 there is moderate bilateral foraminal narrowing secondary to annular bulging and broad-based foraminal disc protrusions  Stable size of a mildly complex right renal cyst         Lumbar PENS     L2 L4  S2 S4  @ 4 HZ x 20 MINUTES       PSIS-BL 54  @ 100 HZ x 20 MINUTES  HEAT LAMP     GV 4     BL 52 5     Left ear Epps Men, Point Zero and Cingulate gyrus       Diagnoses and all orders for this visit:    Chronic bilateral low back pain without sciatica    Benign prostatic hyperplasia with nocturia  -     tadalafil (CIALIS) 5 MG tablet; Take 1 tablet (5 mg total) by mouth in the morning      Follow up visit for acupuncture in 2 weeks  Requesting trial of daily use Cialis 5 mg/day for BPH indication   Side effects from Tamsulosin

## 2022-04-15 NOTE — TELEPHONE ENCOUNTER
Regarding: Resuming Sexual Activity  ----- Message from Damian West RN sent at 4/15/2022  8:19 AM EDT -----  Please advise     ----- Message from Gennaro Lozoya sent at 4/14/2022  6:17 PM -----   My post-op instructions state that I should not have sex until cleared by my healthcare provider  I am not coming back until June 7   seems a bit long to me  Plus, I am going to [place] on May 7

## 2022-04-19 LAB
CALCIUM OXALATE DIHYDRATE MFR STONE IR: 80 %
COLOR STONE: NORMAL
COM MFR STONE: 20 %
COMMENT-STONE3: NORMAL
COMPOSITION: NORMAL
LABORATORY COMMENT REPORT: NORMAL
PHOTO: NORMAL
SIZE STONE: NORMAL MM
SPEC SOURCE SUBJ: NORMAL
STONE ANALYSIS-IMP: NORMAL
STONE ANALYSIS-IMP: NORMAL
WT STONE: 302 MG

## 2022-04-29 ENCOUNTER — PROCEDURE VISIT (OUTPATIENT)
Dept: FAMILY MEDICINE CLINIC | Facility: CLINIC | Age: 57
End: 2022-04-29
Payer: COMMERCIAL

## 2022-04-29 DIAGNOSIS — M54.50 CHRONIC BILATERAL LOW BACK PAIN WITHOUT SCIATICA: Primary | ICD-10-CM

## 2022-04-29 DIAGNOSIS — G89.29 CHRONIC BILATERAL LOW BACK PAIN WITHOUT SCIATICA: Primary | ICD-10-CM

## 2022-04-29 DIAGNOSIS — N40.1 BENIGN PROSTATIC HYPERPLASIA WITH NOCTURIA: ICD-10-CM

## 2022-04-29 DIAGNOSIS — R35.1 BENIGN PROSTATIC HYPERPLASIA WITH NOCTURIA: ICD-10-CM

## 2022-04-29 PROCEDURE — 97813 ACUP 1/> W/ESTIM 1ST 15 MIN: CPT | Performed by: FAMILY MEDICINE

## 2022-04-29 RX ORDER — TADALAFIL 5 MG/1
5 TABLET ORAL DAILY
Qty: 90 TABLET | Refills: 1 | Status: SHIPPED | OUTPATIENT
Start: 2022-04-29 | End: 2022-05-02 | Stop reason: SDUPTHER

## 2022-04-29 NOTE — PROGRESS NOTES
Acupuncture visit   Dx chronic lower back pain  Patient reports symptom relief with his 1st 2 treatment  No radicular pain  No leg weakness or numbness  No new bowel or bladder changes  He is currently on Gabapentin 300 mg 3x/day and prn Ibuprofen  Prior pain management evaluation  09/2021 L5-S1 interlaminar GERARDO  05/2021 MRI lumbar spine Inferiorly extruded or possibly sequestered disc herniation at the L5-S1 level partially effacing the ventral epidural space and abutting the thecal sac and left S1 nerve  Degenerative disc disease at L4-5 with annular bulging and a small right foraminal disc protrusion   Moderate right foraminal narrowing with disc material abutting and slightly displacing the exiting nerve   Correlate for right L4 radiculopathy  At L3-4 there is moderate bilateral foraminal narrowing secondary to annular bulging and broad-based foraminal disc protrusions  Stable size of a mildly complex right renal cyst      Lumbar PENS     L2 L4  S2 S4  @ 4 HZ x 20 MINUTES       PSIS-BL 54  @ 100 HZ x 20 MINUTES  HEAT LAMP     GV 4     BL 52 5     Left ear Epps Men, Point Zero and Cingulate gyrus       Diagnoses and all orders for this visit:    Chronic bilateral low back pain without sciatica    Benign prostatic hyperplasia with nocturia  -     tadalafil (CIALIS) 5 MG tablet;  Take 1 tablet (5 mg total) by mouth in the morning

## 2022-05-02 ENCOUNTER — TELEPHONE (OUTPATIENT)
Dept: FAMILY MEDICINE CLINIC | Facility: CLINIC | Age: 57
End: 2022-05-02

## 2022-05-02 DIAGNOSIS — R35.1 BENIGN PROSTATIC HYPERPLASIA WITH NOCTURIA: ICD-10-CM

## 2022-05-02 DIAGNOSIS — N40.1 BENIGN PROSTATIC HYPERPLASIA WITH NOCTURIA: ICD-10-CM

## 2022-05-02 RX ORDER — TADALAFIL 5 MG/1
5 TABLET ORAL DAILY
Qty: 30 TABLET | Refills: 0 | Status: SHIPPED | OUTPATIENT
Start: 2022-05-02 | End: 2022-05-31

## 2022-05-02 NOTE — TELEPHONE ENCOUNTER
Patient needs an emergency supply of CIALIS 5 mg #30 sent to [facility]  Mailorder supple ordered on 4/29/22 will not get delivered before he leaves for vacation

## 2022-05-02 NOTE — TELEPHONE ENCOUNTER
Patient needs an emergency supply of CIALIS 5 mg #30 sent to [facility]  Mailorder supple ordered on 4/29/22 will not get delivered before he leaves for vacation

## 2022-05-29 DIAGNOSIS — R35.1 BENIGN PROSTATIC HYPERPLASIA WITH NOCTURIA: ICD-10-CM

## 2022-05-29 DIAGNOSIS — N40.1 BENIGN PROSTATIC HYPERPLASIA WITH NOCTURIA: ICD-10-CM

## 2022-05-31 DIAGNOSIS — R35.1 BENIGN PROSTATIC HYPERPLASIA WITH NOCTURIA: ICD-10-CM

## 2022-05-31 DIAGNOSIS — N40.1 BENIGN PROSTATIC HYPERPLASIA WITH NOCTURIA: ICD-10-CM

## 2022-05-31 RX ORDER — TADALAFIL 5 MG/1
5 TABLET ORAL DAILY
Qty: 90 TABLET | Refills: 0 | Status: SHIPPED | OUTPATIENT
Start: 2022-05-31

## 2022-05-31 RX ORDER — TADALAFIL 5 MG/1
5 TABLET ORAL DAILY
Qty: 30 TABLET | Refills: 5 | Status: SHIPPED | OUTPATIENT
Start: 2022-05-31 | End: 2022-05-31 | Stop reason: SDUPTHER

## 2022-05-31 NOTE — TELEPHONE ENCOUNTER
Spoke to patient, states that his insurance has sent him e-mails stated that he should send script to [facility]

## 2022-06-06 NOTE — PROGRESS NOTES
6/7/2022    Guillermina Pedersen  1965  [id number]      Assessment  -Bladder calculus s/p cystolitholapaxy (4/13/2022)    Discussion/Plan  Delia Marroquin is a 60 y o  male being managed by Dr Trupti Petit        1  Bladder calculus s/p cystolitholapaxy (4/13/2022)- PVR in the office today is 65 mL  He is pleased with improvement of his urinary pattern  Reviewed dietary recommendations, and provided educational information  Will continue to monitor additional nonobstructing calculus seen on prior imaging  Plan to follow up in February 2023 with PSA, EVA, and KUB  He was advised to call sooner with any issues      -All questions answered, patient agrees with plan      History of Present Illness  60 y o  male with a history of bladder calculus presents today for follow up  Patient underwent cystolitholapaxy on 04/13/2022  Two large bladder calculi were successfully extracted  Final stone analysis revealed primarily calcium oxalate  Patient reports significant improvement in urinary urgency and strength of stream   He denies any gross hematuria or dysuria  Prior imaging identified additional nonobstructing calculus  His last PSA from 02/05/2022 was 0 6  Patient denies any strong family history of prostate cancer  Review of Systems  Review of Systems   Constitutional: Negative  HENT: Negative  Respiratory: Negative  Cardiovascular: Negative  Gastrointestinal: Negative  Genitourinary: Negative for decreased urine volume, difficulty urinating, dysuria, flank pain, frequency, hematuria and urgency  Musculoskeletal: Negative  Skin: Negative  Neurological: Negative  Psychiatric/Behavioral: Negative          Past Medical History  Past Medical History:   Diagnosis Date    Acute sinusitis     Allergic rhinitis     Chronic sinusitis     last assessed 10/23/2012    Hyperlipemia     Kidney stone 5/2001       Past Social History  Past Surgical History:   Procedure Laterality Date    CHOLECYSTECTOMY      COLONOSCOPY N/A 1/22/2016    Procedure: COLONOSCOPY;  Surgeon: Schuyler Paris MD;  [facility];   Service:     CYSTOSCOPY      4/13/22    HERNIA REPAIR      Inguinal    KIDNEY STONE SURGERY      In your records    LITHOTRIPSY      In your records   [address] Right 1997    Knee    NH CYSTOURETHROSCOPY N/A 4/13/2022    Procedure: Trinity Kiki;  Surgeon: Bre Workman MD;  [facility];  Service: Urology    NH REMOVE BLADDER STONE,>2 5 CM N/A 4/13/2022    Procedure: Andre Zelaya;  Surgeon: Bre Workman MD;  [facility];  Service: Urology    SHOULDER ARTHROSCOPY Right        Past Family History  Family History   Problem Relation Age of Onset    Nephrolithiasis Mother    [place] Hyperlipidemia Mother     Hypertension Mother     Urolithiasis Mother     Hyperlipidemia Father     Nephrolithiasis Brother     Multiple sclerosis Sister     Coronary artery disease Maternal Grandmother        Past Social history  Social History     Socioeconomic History    Marital status: /Civil Union     Spouse name: Not on file    Number of children: Not on file    Years of education: Not on file    Highest education level: Not on file   Occupational History     Employer: [employer]   Tobacco Use    Smoking status: Never Smoker    Smokeless tobacco: Never Used   Vaping Use    Vaping Use: Never used   Substance and Sexual Activity    Alcohol use: Yes     Types: 6 Cans of beer per week     Comment: social - last drink  4/09/22     Drug use: Never    Sexual activity: Yes     Partners: Female     Birth control/protection: None   Other Topics Concern    Not on file   Social History Narrative    Not on file     Social Determinants of Health     Financial Resource Strain: Not on file   Food Insecurity: Not on file   Transportation Needs: Not on file   Physical Activity: Not on file   Stress: Not on file   Social Connections: Not on file   Intimate Partner Violence: Not on file   Housing Stability: Not on file       Current Medications  Current Outpatient Medications   Medication Sig Dispense Refill    tadalafil (CIALIS) 5 MG tablet Take 1 tablet (5 mg total) by mouth in the morning 90 tablet 0    Ascorbic Acid (Vitamin C) 500 MG CHEW Chew 1 Dose daily      aspirin 81 MG tablet Take 81 mg by mouth daily   atorvastatin (LIPITOR) 80 mg tablet Take 1 tablet (80 mg total) by mouth daily 90 tablet 3    b complex vitamins capsule Take 1 capsule by mouth daily   Cholecalciferol (D3-1000) 25 MCG (1000 UT) capsule Take 2 capsules by mouth daily      gabapentin (NEURONTIN) 300 mg capsule Take 2 capsules (600 mg total) by mouth 3 (three) times a day 540 capsule 0    ibuprofen (MOTRIN) 200 mg tablet Take by mouth every 6 (six) hours as needed for mild pain      Loratadine 10 MG CAPS Take 1 capsule by mouth daily      Magnesium 500 MG TABS Take 1 tablet by mouth daily      Multiple Vitamin (MULTIVITAMIN) capsule Take 1 capsule by mouth daily   Omega-3 Fatty Acids (fish oil) 1,000 mg Take 1,000 mg by mouth daily      vitamin E, tocopherol, 400 units capsule Take 400 Units by mouth daily   Zinc 50 MG TABS Take 1 tablet by mouth daily       No current facility-administered medications for this visit  Allergies  No Known Allergies    Past Medical History, Social History, Family History, medications and allergies were reviewed  Vitals  There were no vitals filed for this visit  Physical Exam  Physical Exam  Constitutional:       Appearance: Normal appearance  He is well-developed  HENT:      Head: Normocephalic  Eyes:      Pupils: Pupils are equal, round, and reactive to light  Pulmonary:      Effort: Pulmonary effort is normal    Abdominal:      Palpations: Abdomen is soft  Tenderness: There is no right CVA tenderness or left CVA tenderness  Musculoskeletal:         General: Normal range of motion        Cervical back: Normal range of motion  Skin:     General: Skin is warm and dry  Neurological:      General: No focal deficit present  Mental Status: He is alert and oriented to person, place, and time  Psychiatric:         Mood and Affect: Mood normal          Behavior: Behavior normal          Thought Content: Thought content normal          Judgment: Judgment normal          Results    I have personally reviewed all pertinent lab results and reviewed with patient  Lab Results   Component Value Date    PSA 0 6 02/05/2022    PSA 0 4 03/13/2021    PSA 0 5 01/04/2019     Lab Results   Component Value Date    GLUCOSE 94 11/20/2015    CALCIUM 9 7 02/05/2022     11/20/2015    K 4 8 02/05/2022    CO2 29 02/05/2022     02/05/2022    BUN 20 02/05/2022    CREATININE 1 01 02/05/2022     Lab Results   Component Value Date    WBC 5 92 02/05/2022    HGB 17 0 02/05/2022    HCT 52 3 (H) 02/05/2022    MCV 93 02/05/2022     02/05/2022     No results found for this or any previous visit (from the past 1 hour(s))

## 2022-06-07 ENCOUNTER — OFFICE VISIT (OUTPATIENT)
Dept: UROLOGY | Facility: AMBULATORY SURGERY CENTER | Age: 57
End: 2022-06-07
Payer: COMMERCIAL

## 2022-06-07 VITALS
SYSTOLIC BLOOD PRESSURE: 136 MMHG | BODY MASS INDEX: 30.54 KG/M2 | HEART RATE: 76 BPM | WEIGHT: 238 LBS | HEIGHT: 74 IN | DIASTOLIC BLOOD PRESSURE: 88 MMHG

## 2022-06-07 DIAGNOSIS — Z12.5 SCREENING FOR PROSTATE CANCER: ICD-10-CM

## 2022-06-07 DIAGNOSIS — N21.0 BLADDER CALCULUS: Primary | ICD-10-CM

## 2022-06-07 LAB
POST-VOID RESIDUAL VOLUME, ML POC: 65 ML
SL AMB  POCT GLUCOSE, UA: NORMAL
SL AMB LEUKOCYTE ESTERASE,UA: NORMAL
SL AMB POCT BILIRUBIN,UA: NORMAL
SL AMB POCT BLOOD,UA: NORMAL
SL AMB POCT CLARITY,UA: CLEAR
SL AMB POCT COLOR,UA: YELLOW
SL AMB POCT KETONES,UA: NORMAL
SL AMB POCT NITRITE,UA: NORMAL
SL AMB POCT PH,UA: 6.5
SL AMB POCT SPECIFIC GRAVITY,UA: 1.02
SL AMB POCT URINE PROTEIN: NORMAL
SL AMB POCT UROBILINOGEN: 0.2

## 2022-06-07 PROCEDURE — 51798 US URINE CAPACITY MEASURE: CPT | Performed by: NURSE PRACTITIONER

## 2022-06-07 PROCEDURE — 3008F BODY MASS INDEX DOCD: CPT | Performed by: NURSE PRACTITIONER

## 2022-06-07 PROCEDURE — 1036F TOBACCO NON-USER: CPT | Performed by: NURSE PRACTITIONER

## 2022-06-07 PROCEDURE — 81002 URINALYSIS NONAUTO W/O SCOPE: CPT | Performed by: NURSE PRACTITIONER

## 2022-06-07 PROCEDURE — 99213 OFFICE O/P EST LOW 20 MIN: CPT | Performed by: NURSE PRACTITIONER

## 2022-08-26 DIAGNOSIS — N40.1 BENIGN PROSTATIC HYPERPLASIA WITH NOCTURIA: ICD-10-CM

## 2022-08-26 DIAGNOSIS — R35.1 BENIGN PROSTATIC HYPERPLASIA WITH NOCTURIA: ICD-10-CM

## 2022-08-26 RX ORDER — TADALAFIL 5 MG/1
5 TABLET ORAL DAILY
Qty: 90 TABLET | Refills: 0 | Status: SHIPPED | OUTPATIENT
Start: 2022-08-26

## 2022-08-30 ENCOUNTER — APPOINTMENT (OUTPATIENT)
Dept: RADIOLOGY | Facility: MEDICAL CENTER | Age: 57
End: 2022-08-30
Payer: COMMERCIAL

## 2022-08-30 ENCOUNTER — OFFICE VISIT (OUTPATIENT)
Dept: OBGYN CLINIC | Facility: MEDICAL CENTER | Age: 57
End: 2022-08-30
Payer: COMMERCIAL

## 2022-08-30 VITALS
HEART RATE: 80 BPM | WEIGHT: 228 LBS | DIASTOLIC BLOOD PRESSURE: 83 MMHG | BODY MASS INDEX: 29.26 KG/M2 | SYSTOLIC BLOOD PRESSURE: 125 MMHG | HEIGHT: 74 IN

## 2022-08-30 DIAGNOSIS — M25.562 ACUTE PAIN OF LEFT KNEE: Primary | ICD-10-CM

## 2022-08-30 DIAGNOSIS — S83.242A TEAR OF MEDIAL MENISCUS OF LEFT KNEE, CURRENT, UNSPECIFIED TEAR TYPE, INITIAL ENCOUNTER: ICD-10-CM

## 2022-08-30 DIAGNOSIS — M25.562 LEFT KNEE PAIN, UNSPECIFIED CHRONICITY: ICD-10-CM

## 2022-08-30 DIAGNOSIS — M25.462 EFFUSION OF LEFT KNEE: ICD-10-CM

## 2022-08-30 PROCEDURE — 73562 X-RAY EXAM OF KNEE 3: CPT

## 2022-08-30 PROCEDURE — 99214 OFFICE O/P EST MOD 30 MIN: CPT | Performed by: EMERGENCY MEDICINE

## 2022-08-30 NOTE — PROGRESS NOTES
Assessment/Plan:    Diagnoses and all orders for this visit:    Acute pain of left knee  -     XR knee 3 vw left non injury; Future  -     MRI knee left  wo contrast; Future    Effusion of left knee  -     MRI knee left  wo contrast; Future    Tear of medial meniscus of left knee, current, unspecified tear type, initial encounter  -     MRI knee left  wo contrast; Future    Acute on chronic left medial knee pain with mechanical symptoms, obtain MRI left knee r/o MMT  Continue Motrin  T/C CSI if no flipped fragment  T/C PT    Return for Follow Up After Imaging Study  Chief Complaint:     Chief Complaint   Patient presents with    Left Knee - Pain       Subjective:   Patient ID: Lesley Patricia is a 59 y o  male  NP presents for about a month of gradual onset Left medial knee pain , + Mild swelling noted along with painful pops  Twisting or pivoting causes pain, unable to stand single legged  Taking Motrin  Hx chronic b/l knee pain  Works in [address] does a lot of kneeling/squatting and lifting  Notes his left knee flared up a few years ago and he pushed something back into his medial knee  Hx B/L knee arthroscopy possibly partial meniscectomies      Review of Systems    The following portions of the patient's chart were reviewed and updated as appropriate: Allergy:  No Known Allergies      Past Medical History:   Diagnosis Date    Acute sinusitis     Allergic rhinitis     Chronic sinusitis     last assessed 10/23/2012    Hyperlipemia     Kidney stone 5/2001       Past Surgical History:   Procedure Laterality Date    CHOLECYSTECTOMY      COLONOSCOPY N/A 1/22/2016    Procedure: COLONOSCOPY;  Surgeon: Marva Gomes MD;  [facility];   Service:     CYSTOSCOPY      4/13/22    HERNIA REPAIR      Inguinal    KIDNEY STONE SURGERY      In your records    LITHOTRIPSY      In your records    ORTHOPEDIC SURGERY Right 1997    Knee    GA CYSTOURETHROSCOPY N/A 4/13/2022    Procedure: CYSTO;  Surgeon: Sushant Maurice MD;  [facility];  Service: Urology    NC REMOVE BLADDER STONE,>2 5 CM N/A 4/13/2022    Procedure: Jeane Medellin;  Surgeon: Sushant Maurice MD;  [facility];  Service: Urology    SHOULDER ARTHROSCOPY Right        Social History     Socioeconomic History    Marital status: /Civil Union     Spouse name: Not on file    Number of children: Not on file    Years of education: Not on file    Highest education level: Not on file   Occupational History     Employer: [employer]   Tobacco Use    Smoking status: Never Smoker    Smokeless tobacco: Never Used   Vaping Use    Vaping Use: Never used   Substance and Sexual Activity    Alcohol use: Yes     Types: 6 Cans of beer per week     Comment: social - last drink  4/09/22     Drug use: Never    Sexual activity: Yes     Partners: Female     Birth control/protection: None   Other Topics Concern    Not on file   Social History Narrative    Not on file     Social Determinants of Health     Financial Resource Strain: Not on file   Food Insecurity: Not on file   Transportation Needs: Not on file   Physical Activity: Not on file   Stress: Not on file   Social Connections: Not on file   Intimate Partner Violence: Not on file   Housing Stability: Not on file       Family History   Problem Relation Age of Onset    Nephrolithiasis Mother     Hyperlipidemia Mother     Hypertension Mother     Urolithiasis Mother     Hyperlipidemia Father     Nephrolithiasis Brother     Multiple sclerosis Sister     Coronary artery disease Maternal Grandmother        Medications:    Current Outpatient Medications:     Ascorbic Acid (Vitamin C) 500 MG CHEW, Chew 1 Dose daily, Disp: , Rfl:     aspirin 81 MG tablet, Take 81 mg by mouth daily  , Disp: , Rfl:     atorvastatin (LIPITOR) 80 mg tablet, Take 1 tablet (80 mg total) by mouth daily, Disp: 90 tablet, Rfl: 3    b complex vitamins capsule, Take 1 capsule by mouth daily , Disp: , Rfl:     Cholecalciferol (D3-1000) 25 MCG (1000 UT) capsule, Take 2 capsules by mouth daily, Disp: , Rfl:     ibuprofen (MOTRIN) 200 mg tablet, Take by mouth every 6 (six) hours as needed for mild pain, Disp: , Rfl:     Loratadine 10 MG CAPS, Take 1 capsule by mouth daily, Disp: , Rfl:     Magnesium 500 MG TABS, Take 1 tablet by mouth daily, Disp: , Rfl:     Multiple Vitamin (MULTIVITAMIN) capsule, Take 1 capsule by mouth daily  , Disp: , Rfl:     Omega-3 Fatty Acids (fish oil) 1,000 mg, Take 1,000 mg by mouth daily, Disp: , Rfl:     tadalafil (CIALIS) 5 MG tablet, Take 1 tablet (5 mg total) by mouth in the morning, Disp: 90 tablet, Rfl: 0    vitamin E, tocopherol, 400 units capsule, Take 400 Units by mouth daily  , Disp: , Rfl:     Zinc 50 MG TABS, Take 1 tablet by mouth daily, Disp: , Rfl:     gabapentin (NEURONTIN) 300 mg capsule, Take 2 capsules (600 mg total) by mouth 3 (three) times a day, Disp: 540 capsule, Rfl: 0    Patient Active Problem List   Diagnosis    Kidney stones    Complex renal cyst    Hyperlipidemia    Coronary artery calcification    Chronic bilateral low back pain without sciatica    Lumbar degenerative disc disease    Lumbar disc herniation    Lumbar disc disease with radiculopathy       Objective:  /83 (BP Location: Right arm, Patient Position: Sitting, Cuff Size: Large)   Pulse 80   Ht 6' 2" (1 88 m)   Wt 103 kg (228 lb)   BMI 29 27 kg/m²     Left Knee Exam     Tenderness   The patient is experiencing tenderness in the medial joint line  Range of Motion   The patient has normal left knee ROM  Tests   Maikel:  Medial - positive Lateral - negative  Varus: negative Valgus: negative  Lachman:  Anterior - negative    Posterior - negative    Other   Erythema: absent  Sensation: normal  Swelling: mild  Effusion: effusion present          Observations   Left Knee   Positive for effusion         Physical Exam  Musculoskeletal:      Left knee: Effusion present  Instability Tests: Medial Maikel test positive  Lateral Maikel test negative             Neurologic Exam    Procedures    I have personally reviewed pertinent films in PACS and my interpretation is Xrays Left Knee: No acute fracture or D/L, no significant DJD   + Effusion

## 2022-08-30 NOTE — LETTER
August 30, 2022     Patient: Justa Starr  YOB: 1965  Date of Visit: 8/30/2022      To Whom it May Concern:    Jhoan Carter is under my professional care  Angelica Setting was seen in my office on 8/30/2022  If you have any questions or concerns, please don't hesitate to call           Sincerely,          Frieda Carroll MD        CC: No Recipients

## 2022-09-13 ENCOUNTER — TELEPHONE (OUTPATIENT)
Dept: OBGYN CLINIC | Facility: MEDICAL CENTER | Age: 57
End: 2022-09-13

## 2022-09-13 NOTE — TELEPHONE ENCOUNTER
----- Message from Tito Harper sent at 9/12/2022  8:04 PM EDT -----  Regarding: MRI  I received a letter from [facility] stating my upcoming MRI needs more information (medically necessary?) before approving the procedure        Do they have what they need or am I not to have the test?

## 2022-09-13 NOTE — TELEPHONE ENCOUNTER
Called pt and notified him that his MRI was approved on 9/7, he should receive an authrization letter in the mail  Pt verbalized understanding

## 2022-09-24 ENCOUNTER — HOSPITAL ENCOUNTER (OUTPATIENT)
Dept: MRI IMAGING | Facility: HOSPITAL | Age: 57
Discharge: HOME | End: 2022-09-24
Attending: EMERGENCY MEDICINE
Payer: COMMERCIAL

## 2022-09-24 DIAGNOSIS — S83.242A TEAR OF MEDIAL MENISCUS OF LEFT KNEE, CURRENT, UNSPECIFIED TEAR TYPE, INITIAL ENCOUNTER: ICD-10-CM

## 2022-09-24 DIAGNOSIS — M25.462 EFFUSION OF LEFT KNEE: ICD-10-CM

## 2022-09-24 DIAGNOSIS — M25.562 ACUTE PAIN OF LEFT KNEE: ICD-10-CM

## 2022-09-24 PROCEDURE — 73721 MRI JNT OF LWR EXTRE W/O DYE: CPT

## 2022-09-24 PROCEDURE — G1004 CDSM NDSC: HCPCS

## 2022-09-26 ENCOUNTER — TELEPHONE (OUTPATIENT)
Dept: OBGYN CLINIC | Facility: MEDICAL CENTER | Age: 57
End: 2022-09-26

## 2022-09-26 NOTE — TELEPHONE ENCOUNTER
----- Message from Fransico Nguyen MD sent at 9/26/2022 11:47 AM EDT -----  Please notify patient that he has a meniscus tear with a flipped fragment which is likely causing him painful popping and locking of the knee  I recommend he see orthopedic surgeon  He is scheduled to see me tomorrow, I can still see him or we can cancel appt if he would like  He could see Scott Lopez or Hernan Pierre     Let me know what he decides      Thanks

## 2022-10-21 ENCOUNTER — OFFICE VISIT (OUTPATIENT)
Dept: OBGYN CLINIC | Facility: MEDICAL CENTER | Age: 57
End: 2022-10-21
Payer: COMMERCIAL

## 2022-10-21 VITALS
HEART RATE: 89 BPM | HEIGHT: 74 IN | DIASTOLIC BLOOD PRESSURE: 84 MMHG | SYSTOLIC BLOOD PRESSURE: 124 MMHG | BODY MASS INDEX: 28.88 KG/M2 | WEIGHT: 225 LBS

## 2022-10-21 DIAGNOSIS — S83.242A OTHER TEAR OF MEDIAL MENISCUS, CURRENT INJURY, LEFT KNEE, INITIAL ENCOUNTER: Primary | ICD-10-CM

## 2022-10-21 PROCEDURE — 99204 OFFICE O/P NEW MOD 45 MIN: CPT | Performed by: ORTHOPAEDIC SURGERY

## 2022-10-21 NOTE — PROGRESS NOTES
Assessment:   Diagnosis ICD-10-CM Associated Orders   1  Other tear of medial meniscus, current injury, left knee, initial encounter  S83 242A        Plan:  - Given patient's symptoms, physical exam, and findings on MRI, treatment through partial medial meniscectomy would be most appropriate at this time  -Risks and benefits were discussed  -Informed consent was obtained   -Will follow-up with patient on date of surgery    To do next visit:  Return On date of surgery  The above stated was discussed in layman's terms and the patient expressed understanding  All questions were answered to the patient's satisfaction  Subjective:   Shantel Marie is a 59 y o  male community ambulator not on any blood thinners who presents with a 4 month history of L medial sided knee pain  He does not recount ay incident event or trauma to the knee, and describes the course as insidious  He describes the pain as dull and radiating across the medial joint line  He notes prior arthroscopy for the b/l knees in the 90s  He has tried advil for pain, with minimal relief  He is here to review his MRI results, as well as discuss potential treatment options  He notes a painful popping and clicking when he ambulates  No additional concerns or questions at this time  Review of systems negative unless otherwise specified in HPI    Past Medical History:   Diagnosis Date   • Acute sinusitis    • Allergic rhinitis    • Chronic sinusitis     last assessed 10/23/2012   • Hyperlipemia    • Kidney stone 5/2001       Past Surgical History:   Procedure Laterality Date   • CHOLECYSTECTOMY     • COLONOSCOPY N/A 1/22/2016    Procedure: COLONOSCOPY;  Surgeon: Leandro Calderón MD;  [facility];   Service:    • CYSTOSCOPY      4/13/22   • HERNIA REPAIR      Inguinal   • KIDNEY STONE SURGERY      In your records   • LITHOTRIPSY      In your records   • ORTHOPEDIC SURGERY Right 1997    Knee   • NE CYSTOURETHROSCOPY N/A 4/13/2022 Procedure: CYSTO;  Surgeon: Josefina Adrian MD;  [facility];  Service: Urology   • FL REMOVE BLADDER STONE,>2 5 CM N/A 4/13/2022    Procedure: Rachael Musa;  Surgeon: Josefina Adrian MD;  [facility];  Service: Urology   • SHOULDER ARTHROSCOPY Right        Family History   Problem Relation Age of Onset   • Nephrolithiasis Mother    • Hyperlipidemia Mother    • Hypertension Mother    • Urolithiasis Mother    • Hyperlipidemia Father    • Nephrolithiasis Brother    • Multiple sclerosis Sister    • Coronary artery disease Maternal Grandmother        Social History     Occupational History     Employer: [employer]   Tobacco Use   • Smoking status: Never Smoker   • Smokeless tobacco: Never Used   Vaping Use   • Vaping Use: Never used   Substance and Sexual Activity   • Alcohol use: Yes     Types: 6 Cans of beer per week     Comment: social - last drink  4/09/22    • Drug use: Never   • Sexual activity: Yes     Partners: Female     Birth control/protection: None         Current Outpatient Medications:   •  Ascorbic Acid (Vitamin C) 500 MG CHEW, Chew 1 Dose daily, Disp: , Rfl:   •  aspirin 81 MG tablet, Take 81 mg by mouth daily  , Disp: , Rfl:   •  atorvastatin (LIPITOR) 80 mg tablet, Take 1 tablet (80 mg total) by mouth daily, Disp: 90 tablet, Rfl: 3  •  b complex vitamins capsule, Take 1 capsule by mouth daily  , Disp: , Rfl:   •  Cholecalciferol (D3-1000) 25 MCG (1000 UT) capsule, Take 2 capsules by mouth daily, Disp: , Rfl:   •  ibuprofen (MOTRIN) 200 mg tablet, Take by mouth every 6 (six) hours as needed for mild pain, Disp: , Rfl:   •  Loratadine 10 MG CAPS, Take 1 capsule by mouth daily, Disp: , Rfl:   •  Magnesium 500 MG TABS, Take 1 tablet by mouth daily, Disp: , Rfl:   •  Multiple Vitamin (MULTIVITAMIN) capsule, Take 1 capsule by mouth daily  , Disp: , Rfl:   •  Omega-3 Fatty Acids (fish oil) 1,000 mg, Take 1,000 mg by mouth daily, Disp: , Rfl:   •  tadalafil (CIALIS) 5 MG tablet, Take 1 tablet (5 mg total) by mouth in the morning, Disp: 90 tablet, Rfl: 0  •  vitamin E, tocopherol, 400 units capsule, Take 400 Units by mouth daily  , Disp: , Rfl:   •  Zinc 50 MG TABS, Take 1 tablet by mouth daily, Disp: , Rfl:   •  gabapentin (NEURONTIN) 300 mg capsule, Take 2 capsules (600 mg total) by mouth 3 (three) times a day, Disp: 540 capsule, Rfl: 0    No Known Allergies         Vitals:    10/21/22 1330   BP: 124/84   Pulse: 89       Objective:                    Left Knee Exam     Tenderness   The patient is experiencing tenderness in the medial joint line  Range of Motion   Extension: normal   Flexion: 100     Tests   Maikel:  Medial - positive   Varus: negative Valgus: negative    Other   Sensation: normal  Swelling: mild    Comments:  Mild Varus deformity            Diagnostics, reviewed and taken today if performed as documented: The attending physician has personally reviewed the pertinent films in PACS and interpretation is as follows:  MRI left knee- Undersurface tear of the posterior horn of the medial meniscus  X-ray- 3 view of left knee revealing no significant signs of osteoarthritis, joint effusion, or acute fracture  Procedures, if performed today:      None performed      Portions of the record may have been created with voice recognition software  Occasional wrong word or "sound a like" substitutions may have occurred due to the inherent limitations of voice recognition software  Read the chart carefully and recognize, using context, where substitutions have occurred

## 2022-10-23 DIAGNOSIS — E78.01 FAMILIAL HYPERCHOLESTEROLEMIA: ICD-10-CM

## 2022-10-24 RX ORDER — ATORVASTATIN CALCIUM 80 MG/1
TABLET, FILM COATED ORAL
Qty: 90 TABLET | Refills: 3 | Status: SHIPPED | OUTPATIENT
Start: 2022-10-24

## 2022-11-11 NOTE — PRE-PROCEDURE INSTRUCTIONS
Pre-Surgery Instructions:   Medication Instructions   • Ascorbic Acid (Vitamin C) 500 MG CHEW Last dose 11/11   • aspirin 81 MG tablet Last dose 11/11   • atorvastatin (LIPITOR) 80 mg tablet Take night before surgery   • b complex vitamins capsule Last dose 11/11   • Cholecalciferol (D3-1000) 25 MCG (1000 UT) capsule Last dose 11/11   • ibuprofen (MOTRIN) 200 mg tablet Last dose 11/11   • Loratadine 10 MG CAPS Uses PRN- OK to take day of surgery   • Magnesium 500 MG TABS Last dose 11/11   • Multiple Vitamin (MULTIVITAMIN) capsule Last dose 11/11   • Omega-3 Fatty Acids (fish oil) 1,000 mg Last dose 11/11   • tadalafil (CIALIS) 5 MG tablet Take day of surgery  • vitamin E, tocopherol, 400 units capsule Last dose 11/11   • Zinc 50 MG TABS Last dose 11/11     Spoke with pt via phone  Advised hospital location will call with arrival time night before surgery  NPO after midnight the night before surgery, including chewing gum and hard candy  A small sip of water is allowed to take approved medications the morning of surgery  Non-vaccinated patients and visitors need to remain masked at all times while in the hospital     Instructed to leave contacts/jewelery/valuables at home  Ok to wear dentures, glasses and hearing aides into the hospital - they will be removed for surgery  No smoking or alcohol consumption 24 hours prior to surgery  Avoid all Aspirin products and OTC Vit/Supp 1 week prior to surgery and avoid NSAIDs 3 days prior to surgery per anesthesia instructions  Tylenol ok to take prn  Showering instructions reviewed for night before and morning of surgery using surgical soap or antibacterial soap  Patient verbalized understanding of all of the above, including medication instructions

## 2022-11-12 ENCOUNTER — APPOINTMENT (OUTPATIENT)
Dept: LAB | Facility: MEDICAL CENTER | Age: 57
End: 2022-11-12

## 2022-11-12 ENCOUNTER — CLINICAL SUPPORT (OUTPATIENT)
Dept: URGENT CARE | Facility: MEDICAL CENTER | Age: 57
End: 2022-11-12

## 2022-11-12 DIAGNOSIS — Z01.818 ENCOUNTER FOR PREADMISSION TESTING: ICD-10-CM

## 2022-11-12 LAB
ALBUMIN SERPL BCP-MCNC: 4.1 G/DL (ref 3.5–5)
ALP SERPL-CCNC: 82 U/L (ref 46–116)
ALT SERPL W P-5'-P-CCNC: 41 U/L (ref 12–78)
ANION GAP SERPL CALCULATED.3IONS-SCNC: 3 MMOL/L (ref 4–13)
AST SERPL W P-5'-P-CCNC: 19 U/L (ref 5–45)
ATRIAL RATE: 71 BPM
BASOPHILS # BLD AUTO: 0.03 THOUSANDS/ÂΜL (ref 0–0.1)
BASOPHILS NFR BLD AUTO: 1 % (ref 0–1)
BILIRUB SERPL-MCNC: 0.59 MG/DL (ref 0.2–1)
BUN SERPL-MCNC: 19 MG/DL (ref 5–25)
CALCIUM SERPL-MCNC: 9.3 MG/DL (ref 8.3–10.1)
CHLORIDE SERPL-SCNC: 107 MMOL/L (ref 96–108)
CO2 SERPL-SCNC: 27 MMOL/L (ref 21–32)
CREAT SERPL-MCNC: 0.88 MG/DL (ref 0.6–1.3)
EOSINOPHIL # BLD AUTO: 0.12 THOUSAND/ÂΜL (ref 0–0.61)
EOSINOPHIL NFR BLD AUTO: 2 % (ref 0–6)
ERYTHROCYTE [DISTWIDTH] IN BLOOD BY AUTOMATED COUNT: 12.6 % (ref 11.6–15.1)
GFR SERPL CREATININE-BSD FRML MDRD: 94 ML/MIN/1.73SQ M
GLUCOSE P FAST SERPL-MCNC: 106 MG/DL (ref 65–99)
HCT VFR BLD AUTO: 49.9 % (ref 36.5–49.3)
HGB BLD-MCNC: 16.5 G/DL (ref 12–17)
IMM GRANULOCYTES # BLD AUTO: 0.02 THOUSAND/UL (ref 0–0.2)
IMM GRANULOCYTES NFR BLD AUTO: 0 % (ref 0–2)
LYMPHOCYTES # BLD AUTO: 1.74 THOUSANDS/ÂΜL (ref 0.6–4.47)
LYMPHOCYTES NFR BLD AUTO: 28 % (ref 14–44)
MCH RBC QN AUTO: 30.4 PG (ref 26.8–34.3)
MCHC RBC AUTO-ENTMCNC: 33.1 G/DL (ref 31.4–37.4)
MCV RBC AUTO: 92 FL (ref 82–98)
MONOCYTES # BLD AUTO: 0.45 THOUSAND/ÂΜL (ref 0.17–1.22)
MONOCYTES NFR BLD AUTO: 7 % (ref 4–12)
NEUTROPHILS # BLD AUTO: 3.95 THOUSANDS/ÂΜL (ref 1.85–7.62)
NEUTS SEG NFR BLD AUTO: 62 % (ref 43–75)
NRBC BLD AUTO-RTO: 0 /100 WBCS
P AXIS: 52 DEGREES
PLATELET # BLD AUTO: 217 THOUSANDS/UL (ref 149–390)
PMV BLD AUTO: 10.2 FL (ref 8.9–12.7)
POTASSIUM SERPL-SCNC: 4.2 MMOL/L (ref 3.5–5.3)
PR INTERVAL: 176 MS
PROT SERPL-MCNC: 7.2 G/DL (ref 6.4–8.4)
QRS AXIS: -27 DEGREES
QRSD INTERVAL: 110 MS
QT INTERVAL: 396 MS
QTC INTERVAL: 430 MS
RBC # BLD AUTO: 5.43 MILLION/UL (ref 3.88–5.62)
SODIUM SERPL-SCNC: 137 MMOL/L (ref 135–147)
T WAVE AXIS: 34 DEGREES
VENTRICULAR RATE: 71 BPM
WBC # BLD AUTO: 6.31 THOUSAND/UL (ref 4.31–10.16)

## 2022-11-14 ENCOUNTER — OFFICE VISIT (OUTPATIENT)
Dept: FAMILY MEDICINE CLINIC | Facility: CLINIC | Age: 57
End: 2022-11-14

## 2022-11-14 VITALS
HEART RATE: 80 BPM | WEIGHT: 222 LBS | DIASTOLIC BLOOD PRESSURE: 86 MMHG | BODY MASS INDEX: 29.42 KG/M2 | TEMPERATURE: 96.1 F | HEIGHT: 73 IN | OXYGEN SATURATION: 96 % | RESPIRATION RATE: 17 BRPM | SYSTOLIC BLOOD PRESSURE: 122 MMHG

## 2022-11-14 DIAGNOSIS — Z01.818 PRE-OP EXAMINATION: Primary | ICD-10-CM

## 2022-11-14 DIAGNOSIS — R35.1 BENIGN PROSTATIC HYPERPLASIA WITH NOCTURIA: ICD-10-CM

## 2022-11-14 DIAGNOSIS — N40.1 BENIGN PROSTATIC HYPERPLASIA WITH NOCTURIA: ICD-10-CM

## 2022-11-14 DIAGNOSIS — S83.242S TEAR OF MEDIAL MENISCUS OF LEFT KNEE, CURRENT, UNSPECIFIED TEAR TYPE, SEQUELA: ICD-10-CM

## 2022-11-14 RX ORDER — TADALAFIL 5 MG/1
5 TABLET ORAL
Qty: 90 TABLET | Refills: 3 | Status: SHIPPED | OUTPATIENT
Start: 2022-11-14

## 2022-11-14 NOTE — PROGRESS NOTES
PRE-OPERATIVE EVALUATION  [facility]    NAME: Obie Quintero  AGE: 59 y o  SEX: male  : 1965     DATE: 2022    Family Medicine Pre-Operative Evaluation      Chief Complaint: had concerns including Pre-op Exam (Dr Herrera- Left knee orthoscopic knee surgery-2022)  Surgery: arthroscopic partial medial meniscectomy Left knee  Anticipated Date of Surgery:   Referring Provider: Dr Yonathan Serrano     History of Present Illness:     Obie Quintero is a 59 y o  male who presents to the office today for a preoperative consultation at the request of the surgeon for the procedure above  Current anti-platelet/anti-coagulation medications that the patient is prescribed includes: Aspirin  Assessment of Cardiac Risk:  · Denies unstable or severe angina or MI in the last 6 weeks or history of stent placement in the last year   · Denies decompensated heart failure (e g  New onset heart failure, NYHA functional class IV heart failure, or worsening existing heart failure)  · Denies significant arrhythmias such as high grade AV block, symptomatic ventricular arrhythmia, newly recognized ventricular tachycardia, supraventricular tachycardia with resting heart rate >100, or symptomatic bradycardia  · Denies severe heart valve disease including aortic stenosis or symptomatic mitral stenosis     Exercise Capacity:  · Able to walk 4 blocks without symptoms?: Yes  · Able to walk 2 flights without symptoms?: Yes    Prior Anesthesia Reactions: No     Personal history of venous thromboembolic disease? No    History of steroid use for >2 weeks within last year? No    Review of Systems:     Review of Systems   Constitutional: Negative for fatigue and fever  HENT: Negative for sore throat  Eyes: Negative for visual disturbance  Respiratory: Negative for cough, chest tightness and shortness of breath  Cardiovascular: Negative for chest pain, palpitations and leg swelling     Gastrointestinal: Negative for abdominal pain, constipation, diarrhea and nausea  Endocrine: Negative for cold intolerance and heat intolerance  Genitourinary: Negative for flank pain  Musculoskeletal: Negative for back pain and neck pain  Left knee pain      Skin: Negative for rash  Neurological: Negative for headaches  Psychiatric/Behavioral: Negative for behavioral problems and confusion  Current Problem List:     Patient Active Problem List   Diagnosis   • Kidney stones   • Complex renal cyst   • Hyperlipidemia   • Coronary artery calcification   • Chronic bilateral low back pain without sciatica   • Lumbar degenerative disc disease   • Lumbar disc herniation   • Lumbar disc disease with radiculopathy   • Other tear of medial meniscus, current injury, left knee, initial encounter   • Pre-op examination     Allergies:     No Known Allergies  Medications:       Current Outpatient Medications:   •  Ascorbic Acid (Vitamin C) 500 MG CHEW, Chew 1 Dose daily, Disp: , Rfl:   •  aspirin 81 MG tablet, Take 81 mg by mouth daily  , Disp: , Rfl:   •  atorvastatin (LIPITOR) 80 mg tablet, TAKE 1 TABLET DAILY (Patient taking differently: daily at bedtime), Disp: 90 tablet, Rfl: 3  •  b complex vitamins capsule, Take 1 capsule by mouth daily  , Disp: , Rfl:   •  Cholecalciferol (D3-1000) 25 MCG (1000 UT) capsule, Take 2 capsules by mouth daily, Disp: , Rfl:   •  Loratadine 10 MG CAPS, Take 1 capsule by mouth daily, Disp: , Rfl:   •  Magnesium 500 MG TABS, Take 1 tablet by mouth daily, Disp: , Rfl:   •  Multiple Vitamin (MULTIVITAMIN) capsule, Take 1 capsule by mouth daily  , Disp: , Rfl:   •  Omega-3 Fatty Acids (fish oil) 1,000 mg, Take 1,000 mg by mouth daily, Disp: , Rfl:   •  tadalafil (CIALIS) 5 MG tablet, Take 1 tablet (5 mg total) by mouth daily at bedtime, Disp: 90 tablet, Rfl: 3  •  vitamin E, tocopherol, 400 units capsule, Take 400 Units by mouth daily  , Disp: , Rfl:   •  Zinc 50 MG TABS, Take 1 tablet by mouth daily, Disp: , Rfl:   •  ibuprofen (MOTRIN) 200 mg tablet, Take by mouth every 6 (six) hours as needed for mild pain (Patient not taking: Reported on 11/14/2022), Disp: , Rfl:   Past Medical History:       Past Medical History:   Diagnosis Date   • Acute sinusitis    • Allergic rhinitis    • Chronic sinusitis     last assessed 10/23/2012   • Hyperlipemia    • Kidney stone 5/2001        Past Surgical History:   Procedure Laterality Date   • CHOLECYSTECTOMY     • COLONOSCOPY N/A 1/22/2016    Procedure: COLONOSCOPY;  Surgeon: Patt Garcia MD;  Location: [facility]; Service:    • CYSTOSCOPY      4/13/22   • HERNIA REPAIR      Inguinal   • KIDNEY STONE SURGERY      In your records   • LITHOTRIPSY      In your records   • ORTHOPEDIC SURGERY Right 1997    Knee   • MI CYSTOURETHROSCOPY N/A 4/13/2022    Procedure: CYSTO;  Surgeon: Nas Graff MD;  Location: [facility];  Service: Urology   • MI REMOVE BLADDER STONE,>2 5 CM N/A 4/13/2022    Procedure: Jairo Ou;  Surgeon: Nas Graff MD;  Location: [facility];  Service: Urology   • SHOULDER ARTHROSCOPY Right         Family History   Problem Relation Age of Onset   • Nephrolithiasis Mother    • Hyperlipidemia Mother    • Hypertension Mother    • Urolithiasis Mother    • Hyperlipidemia Father    • Nephrolithiasis Brother    • Multiple sclerosis Sister    • Coronary artery disease Maternal Grandmother         Social History     Socioeconomic History   • Marital status: /Civil Union     Spouse name: Not on file   • Number of children: Not on file   • Years of education: Not on file   • Highest education level: Not on file   Occupational History     Employer: [employer]   Tobacco Use   • Smoking status: Never Smoker   • Smokeless tobacco: Never Used   Vaping Use   • Vaping Use: Never used   Substance and Sexual Activity   • Alcohol use:  Yes     Alcohol/week: 6 0 standard drinks     Types: 6 Cans of beer per week     Comment: social   • Drug use: Never   • Sexual activity: Yes     Partners: Female     Birth control/protection: None   Other Topics Concern   • Not on file   Social History Narrative   • Not on file     Social Determinants of Health     Financial Resource Strain: Not on file   Food Insecurity: Not on file   Transportation Needs: Not on file   Physical Activity: Not on file   Stress: Not on file   Social Connections: Not on file   Intimate Partner Violence: Not on file   Housing Stability: Not on file      Physical Exam:     /86 (BP Location: Left arm, Patient Position: Sitting, Cuff Size: Large)   Pulse 80   Temp (!) 96 1 °F (35 6 °C)   Resp 17   Ht 6' 1" (1 854 m)   Wt 101 kg (222 lb)   SpO2 96%   BMI 29 29 kg/m²     Physical Exam  Vitals and nursing note reviewed  Constitutional:       Appearance: He is well-developed  HENT:      Head: Normocephalic and atraumatic  Cardiovascular:      Rate and Rhythm: Normal rate and regular rhythm  Pulmonary:      Effort: Pulmonary effort is normal       Breath sounds: Normal breath sounds  Musculoskeletal:         General: Tenderness (Left knee ) present  No swelling  Normal range of motion  Right lower leg: No edema  Left lower leg: No edema  Skin:     General: Skin is warm and dry  Neurological:      General: No focal deficit present  Mental Status: He is alert  Psychiatric:         Mood and Affect: Mood normal          Behavior: Behavior normal         Data:     Pre-operative work-up    Laboratory Results: I have personally reviewed the pertinent laboratory results/reports   Lab Results   Component Value Date    CREATININE 0 88 11/12/2022       EKG: I have personally reviewed pertinent reports           Assessment & Recommendations:     Pre-Op Evaluation Assessment  Cardiac Risk Estimation: per the Revised Cardiac Risk Index (Circ  100:1043, 1999), the patient's risk factors for cardiac complications include None, putting him in: RCI RISK CLASS I (0 risk factors, risk of major cardiac compl  appr  0 5%)  Lina Carr is undergoing an elective Low Risk surgery  They are at [address] for major adverse cardiac event (MACE)  They may proceed with surgery as planned without further workup  Pre-Op Evaluation Plan  1  Further preoperative workup as follows:   - None; no further preoperative work-up is required    2  Medication Management/Recommendations:   - Patient has been instructed to avoid herbs or non-directed vitamins the week prior to surgery to ensure no drug interactions with perioperative surgical and anesthetic medications  - Patient should continue his statin medication up through and including the day of surgery  - Patient has been instructed to avoid aspirin containing medications or non-steroidal anti-inflammatory drugs for the week preceding surgery  3  Patient requires further consultation with: None    4  Assessment of Chronic Conditions:  No problem-specific Assessment & Plan notes found for this encounter  Pre-op form completed and faxed to referring physician noted above      Isaiah Sheth MD  [facility]  [address] [place] [id number]  Phone#  [phone]  Fax#  [phone]

## 2022-11-16 ENCOUNTER — HOSPITAL ENCOUNTER (OUTPATIENT)
Facility: HOSPITAL | Age: 57
Setting detail: OUTPATIENT SURGERY
Discharge: HOME | End: 2022-11-16
Attending: ORTHOPAEDIC SURGERY | Admitting: ORTHOPAEDIC SURGERY

## 2022-11-16 ENCOUNTER — ANESTHESIA (OUTPATIENT)
Dept: PERIOP | Facility: HOSPITAL | Age: 57
End: 2022-11-16

## 2022-11-16 ENCOUNTER — ANESTHESIA EVENT (OUTPATIENT)
Dept: PERIOP | Facility: HOSPITAL | Age: 57
End: 2022-11-16

## 2022-11-16 VITALS
SYSTOLIC BLOOD PRESSURE: 135 MMHG | TEMPERATURE: 97 F | WEIGHT: 220 LBS | HEIGHT: 73 IN | OXYGEN SATURATION: 96 % | RESPIRATION RATE: 16 BRPM | HEART RATE: 75 BPM | BODY MASS INDEX: 29.16 KG/M2 | DIASTOLIC BLOOD PRESSURE: 78 MMHG

## 2022-11-16 DIAGNOSIS — S83.242A OTHER TEAR OF MEDIAL MENISCUS, CURRENT INJURY, LEFT KNEE, INITIAL ENCOUNTER: Primary | ICD-10-CM

## 2022-11-16 RX ORDER — MIDAZOLAM HYDROCHLORIDE 2 MG/2ML
INJECTION, SOLUTION INTRAMUSCULAR; INTRAVENOUS AS NEEDED
Status: DISCONTINUED | OUTPATIENT
Start: 2022-11-16 | End: 2022-11-16

## 2022-11-16 RX ORDER — FENTANYL CITRATE/PF 50 MCG/ML
50 SYRINGE (ML) INJECTION
Status: DISCONTINUED | OUTPATIENT
Start: 2022-11-16 | End: 2022-11-16 | Stop reason: HOSPADM

## 2022-11-16 RX ORDER — DEXAMETHASONE SODIUM PHOSPHATE 10 MG/ML
INJECTION, SOLUTION INTRAMUSCULAR; INTRAVENOUS AS NEEDED
Status: DISCONTINUED | OUTPATIENT
Start: 2022-11-16 | End: 2022-11-16

## 2022-11-16 RX ORDER — SODIUM CHLORIDE, SODIUM LACTATE, POTASSIUM CHLORIDE, CALCIUM CHLORIDE 600; 310; 30; 20 MG/100ML; MG/100ML; MG/100ML; MG/100ML
INJECTION, SOLUTION INTRAVENOUS CONTINUOUS PRN
Status: DISCONTINUED | OUTPATIENT
Start: 2022-11-16 | End: 2022-11-16

## 2022-11-16 RX ORDER — CEFAZOLIN SODIUM 2 G/50ML
2000 SOLUTION INTRAVENOUS ONCE
Status: COMPLETED | OUTPATIENT
Start: 2022-11-16 | End: 2022-11-16

## 2022-11-16 RX ORDER — FENTANYL CITRATE 50 UG/ML
INJECTION, SOLUTION INTRAMUSCULAR; INTRAVENOUS AS NEEDED
Status: DISCONTINUED | OUTPATIENT
Start: 2022-11-16 | End: 2022-11-16

## 2022-11-16 RX ORDER — SODIUM CHLORIDE, SODIUM LACTATE, POTASSIUM CHLORIDE, CALCIUM CHLORIDE 600; 310; 30; 20 MG/100ML; MG/100ML; MG/100ML; MG/100ML
125 INJECTION, SOLUTION INTRAVENOUS CONTINUOUS
Status: DISCONTINUED | OUTPATIENT
Start: 2022-11-16 | End: 2022-11-16 | Stop reason: HOSPADM

## 2022-11-16 RX ORDER — ONDANSETRON 2 MG/ML
4 INJECTION INTRAMUSCULAR; INTRAVENOUS EVERY 6 HOURS PRN
Status: CANCELLED | OUTPATIENT
Start: 2022-11-16

## 2022-11-16 RX ORDER — HYDROMORPHONE HCL IN WATER/PF 6 MG/30 ML
0.2 PATIENT CONTROLLED ANALGESIA SYRINGE INTRAVENOUS
Status: DISCONTINUED | OUTPATIENT
Start: 2022-11-16 | End: 2022-11-16 | Stop reason: HOSPADM

## 2022-11-16 RX ORDER — LIDOCAINE HYDROCHLORIDE 10 MG/ML
INJECTION, SOLUTION EPIDURAL; INFILTRATION; INTRACAUDAL; PERINEURAL AS NEEDED
Status: DISCONTINUED | OUTPATIENT
Start: 2022-11-16 | End: 2022-11-16

## 2022-11-16 RX ORDER — PROPOFOL 10 MG/ML
INJECTION, EMULSION INTRAVENOUS AS NEEDED
Status: DISCONTINUED | OUTPATIENT
Start: 2022-11-16 | End: 2022-11-16

## 2022-11-16 RX ORDER — ONDANSETRON 2 MG/ML
4 INJECTION INTRAMUSCULAR; INTRAVENOUS ONCE AS NEEDED
Status: DISCONTINUED | OUTPATIENT
Start: 2022-11-16 | End: 2022-11-16 | Stop reason: HOSPADM

## 2022-11-16 RX ORDER — BUPIVACAINE HYDROCHLORIDE 2.5 MG/ML
INJECTION, SOLUTION EPIDURAL; INFILTRATION; INTRACAUDAL AS NEEDED
Status: DISCONTINUED | OUTPATIENT
Start: 2022-11-16 | End: 2022-11-16 | Stop reason: HOSPADM

## 2022-11-16 RX ORDER — FAMOTIDINE 20 MG/1
20 TABLET, FILM COATED ORAL 2 TIMES DAILY
COMMUNITY

## 2022-11-16 RX ORDER — ONDANSETRON 2 MG/ML
INJECTION INTRAMUSCULAR; INTRAVENOUS AS NEEDED
Status: DISCONTINUED | OUTPATIENT
Start: 2022-11-16 | End: 2022-11-16

## 2022-11-16 RX ORDER — OXYCODONE HYDROCHLORIDE 5 MG/1
TABLET ORAL
Qty: 30 TABLET | Refills: 0 | Status: SHIPPED | OUTPATIENT
Start: 2022-11-16 | End: 2022-11-16

## 2022-11-16 RX ADMIN — FENTANYL CITRATE 25 MCG: 50 INJECTION INTRAMUSCULAR; INTRAVENOUS at 08:46

## 2022-11-16 RX ADMIN — FENTANYL CITRATE 50 MCG: 50 INJECTION INTRAMUSCULAR; INTRAVENOUS at 08:26

## 2022-11-16 RX ADMIN — LIDOCAINE HYDROCHLORIDE 50 MG: 10 INJECTION, SOLUTION EPIDURAL; INFILTRATION; INTRACAUDAL; PERINEURAL at 08:16

## 2022-11-16 RX ADMIN — DEXAMETHASONE SODIUM PHOSPHATE 10 MG: 10 INJECTION, SOLUTION INTRAMUSCULAR; INTRAVENOUS at 08:25

## 2022-11-16 RX ADMIN — EPINEPHRINE: 1 INJECTION INTRAMUSCULAR; INTRAVENOUS; SUBCUTANEOUS at 08:46

## 2022-11-16 RX ADMIN — FENTANYL CITRATE 25 MCG: 50 INJECTION INTRAMUSCULAR; INTRAVENOUS at 08:37

## 2022-11-16 RX ADMIN — PROPOFOL 200 MG: 10 INJECTION, EMULSION INTRAVENOUS at 08:16

## 2022-11-16 RX ADMIN — MIDAZOLAM 2 MG: 1 INJECTION INTRAMUSCULAR; INTRAVENOUS at 08:10

## 2022-11-16 RX ADMIN — SODIUM CHLORIDE, SODIUM LACTATE, POTASSIUM CHLORIDE, AND CALCIUM CHLORIDE: .6; .31; .03; .02 INJECTION, SOLUTION INTRAVENOUS at 07:47

## 2022-11-16 RX ADMIN — Medication 50 MCG: at 09:19

## 2022-11-16 RX ADMIN — ONDANSETRON 4 MG: 2 INJECTION INTRAMUSCULAR; INTRAVENOUS at 08:42

## 2022-11-16 RX ADMIN — CEFAZOLIN SODIUM 2000 MG: 2 SOLUTION INTRAVENOUS at 08:12

## 2022-11-16 NOTE — CONSULTS
Office Visit  10/21/2022  [address] [facility] [place]   Beba Cardozo MD  Orthopedic Surgery  Other tear of medial meniscus, current injury, left knee, initial encounter  Dx  Left Knee - Pain; Referred by Referral Self  Reason for Visit     Encounter Notes  All notes     Progress Notes from [address]  Instructions       Return for Postop     COVID Immunization Verification [employer] 10/21/2022),   COVID Lab Result Verification (Printed 10/21/2022),   After Visit Summary (Automatic SnapShot taken 11/11/2022)  Additional Documentation    Vitals:   /84   Pulse 89   Ht 6' 2" (1 88 m)   Wt 102 kg (225 lb)   BMI 28 89 kg/m²   BSA 2 28 m²      More Vitals   SmartForms:    [facility] PRE-CHARTING •    [facility] PCMH/PCSP WRAP UP REQUIREMENTS ADVANCED      Encounter Info:   Billing Info,   History,   Allergies,   Detailed Report        Communications    View Encounter Conversation Summary  Orders Placed     None  Medication Changes       None     Medication List     Visit Diagnoses       Other tear of medial meniscus, current injury, left knee, initial encounter     Problem List

## 2022-11-16 NOTE — DISCHARGE INSTRUCTIONS
Discharge Instructions - Orthopedics  Shilpa Handy 59 y o  male MRN: [id number]  Unit/Bed#: APU 18    Weight Bearing Status:                                           Weight Bearing as tolerated to the left lower extremity  DVT prophylaxis:  Complete course of Lovenox as directed    Pain:  Continue analgesics as directed    Showering Instructions:   Do not shower until Postop day 2    Dressing Instructions:   Keep dressing clean, dry and intact until  postop day 2   All of your original dressings may be removed at that time   You may apply large Band-Aids to the incisional sites    Driving Instructions:  No driving until cleared by Orthopaedic Surgery  PT/OT:  Continue PT/OT on outpatient basis as directed    Appt Instructions: If you do not have your appointment, please call the clinic at [phone]  Otherwise followup as scheduled    Contact the office sooner if you experience any increased numbness/tingling in the extremities        Miscellaneous:  None

## 2022-11-16 NOTE — ANESTHESIA PREPROCEDURE EVALUATION
Procedure:  ARTHROSCOPY KNEE (Left: Knee)    Relevant Problems   CARDIO   (+) Coronary artery calcification   (+) Hyperlipidemia      /RENAL   (+) Complex renal cyst   (+) Kidney stones      MUSCULOSKELETAL   (+) Chronic bilateral low back pain without sciatica   (+) Lumbar degenerative disc disease      NEURO/PSYCH   (+) Chronic bilateral low back pain without sciatica      Nervous and Auditory   (+) Lumbar disc disease with radiculopathy      Musculoskeletal and Integument   (+) Other tear of medial meniscus, current injury, left knee, initial encounter        Physical Exam    Airway    Mallampati score: II  TM Distance: >3 FB  Neck ROM: full     Dental   No notable dental hx     Cardiovascular      Pulmonary      Other Findings        Anesthesia Plan  ASA Score- 2     Anesthesia Type- general with ASA Monitors  Additional Monitors:   Airway Plan: LMA  Plan Factors-Exercise tolerance (METS): >4 METS  Chart reviewed  Patient summary reviewed  Patient is not a current smoker  Obstructive sleep apnea risk education given perioperatively  Induction- intravenous  Postoperative Plan- Plan for postoperative opioid use  Planned trial extubation    Informed Consent- Anesthetic plan and risks discussed with patient  I personally reviewed this patient with the CRNA  Discussed and agreed on the Anesthesia Plan with the CRNA  Elaine Madden

## 2022-11-16 NOTE — INTERVAL H&P NOTE
H&P reviewed  After examining the patient I find no changes in the patients condition since the H&P had been written  There were no vitals filed for this visit  Vital signs were taken this morning in the ambulatory surgical suite and they are recorded elsewhere    Head:  Present  CVS:  RRR  Lungs:  Clear bilateral  Abdomen:  Present  Assessment:  Adult male medial meniscus tear of left knee that causes pain and dysfunction despite appropriate nonsurgical treatments  Plan:   To OR for left knee arthroscopy with anticipated partial medial meniscectomy

## 2022-11-16 NOTE — OP NOTE
OPERATIVE REPORT  PATIENT NAME: Meghan Horne    :  1965  MRN: [id number]  Pt Location: [facility] RO[facility] 04    SURGERY DATE: 2022    Surgeon(s) and Role:     * Olena Macario MD - Primary     * Preet Momin PA-C - Assisting    Preop Diagnosis:  Acute pain of left knee [M25 562]  Internal derangement of left knee [M23 92]    Post-Op Diagnosis Codes:     * Acute pain of left knee [M25 562]     * Internal derangement of left knee [M23 92]  Same with patellofemoral chondromalacia  Procedure(s) (LRB):  ARTHROSCOPY KNEE with partial medial  MENISECTOMY (Left)  Left knee arthroscopy with partial medial meniscectomy and patellofemoral chondroplasty  Specimen(s):  * No specimens in log *    Estimated Blood Loss:   Minimal    Drains:  * No LDAs found *    Anesthesia Type:   General    Operative Indications:  Acute pain of left knee [M25 562]  Internal derangement of left knee [M23 92]      Operative Findings:  Effusion:  None  Medial compartment:  Complex tear posteromedial meniscus, very little chondromalacia on the medial femoral condyle proximal tibia  Notch:  Intact ACL, PCL  Lateral compartment:  Normal lateral meniscus, normal lateral femoral and tibial articular cartilage  Patellofemoral compartment:  Grade 3 chondral changes along the lateral aspect the patella, no chondral changes along the trochlear notch  Suprapatellar pouch:  No loose bodies, no plica    Complications:   None    Procedure and Technique: Following induction of adequate level of general anesthesia, the left lower extremity then underwent sterile prep and drape  Antibiotics administered  A standard anterolateral portal was created for introduction of the inflow cannula  The knee was then insufflated with saline  The arthroscope was introduced  Diagnostic arthroscopic examination of patient's left knee was then carried above-mentioned findings    A medial portal was established, utilizing mixture handheld biters and Elba, the complex tear of the posterior horn medial meniscus underwent partial medial meniscectomy  The remainder of the meniscus and trimmed for balance and symmetry  With the arthroscope in the patellofemoral compartment:  A gentle shaving chondroplasty was performed for the loose friable cartilage along the lateral facet of the patella  Satisfied with the extent of surgery, the arthroscope was removed  Arthroscopic portals were closed nylon stitch  He was then injected Marcaine for postoperative pain relief  Sterile dressings were applied  He was awakened from general anesthesia, taken recovery room stable condition with plans to include follow up in the office as an outpatient  Please note, that as no qualified orthopedic resident was available to assist, the assistance of Jessica Gonzales was instrumental in the safe execution of this patient's surgery    Started the patient position, patient prep drape, intraoperative assistance, joint distraction to allow proper medial meniscectomy, portal closure, dressing application, patient transfer, all these were performed under my direct supervision   I was present for the entire procedure    Patient Disposition:  PACU         SIGNATURE: Akila Lawrence MD  DATE: November 16, 2022  TIME: 8:46 AM

## 2022-11-16 NOTE — H&P (VIEW-ONLY)
Office Visit  10/21/2022  [address] [facility] [place]   Wenceslao Simmonds, MD  Orthopedic Surgery  Other tear of medial meniscus, current injury, left knee, initial encounter  Dx  Left Knee - Pain; Referred by Referral Self  Reason for Visit     Encounter Notes  All notes     Progress Notes from [address]  Instructions       Return for Postop     COVID Immunization Verification [employer] 10/21/2022),   COVID Lab Result Verification (Printed 10/21/2022),   After Visit Summary (Automatic SnapShot taken 11/11/2022)  Additional Documentation    Vitals:   /84   Pulse 89   Ht 6' 2" (1 88 m)   Wt 102 kg (225 lb)   BMI 28 89 kg/m²   BSA 2 28 m²      More Vitals   SmartForms:    [facility] PRE-CHARTING •    [facility] PCMH/PCSP WRAP UP REQUIREMENTS ADVANCED      Encounter Info:   Billing Info,   History,   Allergies,   Detailed Report        Communications    View Encounter Conversation Summary  Orders Placed     None  Medication Changes       None     Medication List     Visit Diagnoses       Other tear of medial meniscus, current injury, left knee, initial encounter     Problem List (2) well flexed

## 2022-11-16 NOTE — ANESTHESIA POSTPROCEDURE EVALUATION
Post-Op Assessment Note    CV Status:  Stable  Pain Score: 0    Pain management: adequate     Mental Status:  Alert and awake   Hydration Status:  Euvolemic   PONV Controlled:  Controlled   Airway Patency:  Patent      Post Op Vitals Reviewed: Yes      Staff: CRNA         No notable events documented      /84 (11/16/22 0851)    Temp 98 7 °F (37 1 °C) (11/16/22 0851)    Pulse 81 (11/16/22 0851)   Resp 16 (11/16/22 0851)    SpO2   100%

## 2022-12-02 ENCOUNTER — OFFICE VISIT (OUTPATIENT)
Dept: OBGYN CLINIC | Facility: MEDICAL CENTER | Age: 57
End: 2022-12-02

## 2022-12-02 VITALS
HEIGHT: 73 IN | DIASTOLIC BLOOD PRESSURE: 82 MMHG | SYSTOLIC BLOOD PRESSURE: 120 MMHG | BODY MASS INDEX: 29.16 KG/M2 | HEART RATE: 85 BPM | WEIGHT: 220 LBS

## 2022-12-02 DIAGNOSIS — Z48.89 AFTERCARE FOLLOWING SURGERY: Primary | ICD-10-CM

## 2022-12-02 NOTE — LETTER
December 2, 2022     Patient: Melissa Brand  YOB: 1965  Date of Visit: 12/2/2022      To Whom it May Concern:    Edinson Ludin is under my professional care  Yoshi Julian was seen in my office on 12/2/2022  Yoshi Jordan may return to work on Monday, December 5th, 2022  He is instructed to perform modified duty including resting as needed and in two weeks may return to full duty       If you have any questions or concerns, please don't hesitate to call           Sincerely,          Cristal Epstein MD        CC: No Recipients

## 2022-12-02 NOTE — PROGRESS NOTES
Assessment:   Diagnosis ICD-10-CM Associated Orders   1  Aftercare following surgery  [id number]           Plan:    Patient is 2 week status post left knee partial medial menisectomy doing well  Sutures were removed today  To do next visit:  No follow-ups on file  Patient to follow up in 4 weeks for recheck  The above stated was discussed in layman's terms and the patient expressed understanding  All questions were answered to the patient's satisfaction  Subjective:   Urszula Hunt is a 59 y o  male who presents two weeks after his left knee partial medial menisectomy doing well  He would like to return to work on Monday  He denies new injury or increased pain in his left knee  He would like to go into the hot tub  Review of systems negative unless otherwise specified in HPI    Past Medical History:   Diagnosis Date   • Acute sinusitis    • Allergic rhinitis    • Chronic sinusitis     last assessed 10/23/2012   • Hyperlipemia    • Kidney stone 5/2001       Past Surgical History:   Procedure Laterality Date   • CHOLECYSTECTOMY     • COLONOSCOPY N/A 1/22/2016    Procedure: COLONOSCOPY;  Surgeon: Tommie Isidro MD;  Location: [facility];   Service:    • CYSTOSCOPY      4/13/22   • HERNIA REPAIR      Inguinal   • KIDNEY STONE SURGERY      In your records   • LITHOTRIPSY      In your records   • ORTHOPEDIC SURGERY Right 1997    Knee   • NV CYSTOURETHROSCOPY N/A 4/13/2022    Procedure: CYSTO;  Surgeon: Todd Omer MD;  Location[facility];  Service: Urology   • NV KNEE SCOPE,MED/LAT MENISECTOMY Left 11/16/2022    Procedure: ARTHROSCOPY KNEE with partial medial  MENISECTOMY and patellofemoral chondroplasty;  Surgeon: Maria Dolores Moser MD;  [facility];  Service: Orthopedics   • NV REMOVE BLADDER STONE,>2 5 CM N/A 4/13/2022    Procedure: Leon Ring;  Surgeon: Todd Omer MD;  [facility];  Service: Urology   • SHOULDER ARTHROSCOPY Right        Family History   Problem Relation Age of Onset   • Nephrolithiasis Mother    • Hyperlipidemia Mother    • Hypertension Mother    • Urolithiasis Mother    • Hyperlipidemia Father    • Nephrolithiasis Brother    • Multiple sclerosis Sister    • Coronary artery disease Maternal Grandmother        Social History     Occupational History     Employer: [employer]   Tobacco Use   • Smoking status: Never   • Smokeless tobacco: Never   Vaping Use   • Vaping Use: Never used   Substance and Sexual Activity   • Alcohol use: Yes     Alcohol/week: 6 0 standard drinks     Types: 6 Cans of beer per week     Comment: social   • Drug use: Never   • Sexual activity: Yes     Partners: Female     Birth control/protection: None         Current Outpatient Medications:   •  Ascorbic Acid (Vitamin C) 500 MG CHEW, Chew 1 Dose daily, Disp: , Rfl:   •  aspirin 81 MG tablet, Take 81 mg by mouth daily  , Disp: , Rfl:   •  atorvastatin (LIPITOR) 80 mg tablet, TAKE 1 TABLET DAILY (Patient taking differently: daily at bedtime), Disp: 90 tablet, Rfl: 3  •  b complex vitamins capsule, Take 1 capsule by mouth daily  , Disp: , Rfl:   •  Cholecalciferol (D3-1000) 25 MCG (1000 UT) capsule, Take 2 capsules by mouth daily, Disp: , Rfl:   •  famotidine (PEPCID) 20 mg tablet, Take 20 mg by mouth 2 (two) times a day, Disp: , Rfl:   •  Loratadine 10 MG CAPS, Take 1 capsule by mouth daily, Disp: , Rfl:   •  Magnesium 500 MG TABS, Take 1 tablet by mouth daily, Disp: , Rfl:   •  Multiple Vitamin (MULTIVITAMIN) capsule, Take 1 capsule by mouth daily  , Disp: , Rfl:   •  Omega-3 Fatty Acids (fish oil) 1,000 mg, Take 1,000 mg by mouth daily, Disp: , Rfl:   •  tadalafil (CIALIS) 5 MG tablet, Take 1 tablet (5 mg total) by mouth daily at bedtime, Disp: 90 tablet, Rfl: 3  •  vitamin E, tocopherol, 400 units capsule, Take 400 Units by mouth daily  , Disp: , Rfl:   •  Zinc 50 MG TABS, Take 1 tablet by mouth daily, Disp: , Rfl:     No Known Allergies         Vitals:    12/02/22 1529 BP: 120/82   Pulse: 85       Objective:                    Left Knee Exam     Tenderness   The patient is experiencing no tenderness  Range of Motion   Extension: normal   Flexion: normal     Tests   Varus: negative Valgus: negative    Other   Erythema: absent  Scars: present  Sensation: normal  Pulse: present  Swelling: none  Effusion: no effusion present            Diagnostics, reviewed and taken today if performed as documented:    None performed      The attending physician has personally reviewed the pertinent films in PACS and interpretation is as follows:      Procedures, if performed today:    Suture removal    Date/Time: 12/2/2022 4:22 PM  Performed by: Evelia Glasgow MD  Authorized by: Evelia Glasgow MD   Universal Protocol:  Consent: Verbal consent obtained  Consent given by: patient  Patient understanding: patient states understanding of the procedure being performed        Patient location:  Bedside  Location:     Laterality:  Left    Location:  Lower extremity    Lower extremity location:  Knee    Knee location:  L knee  Procedure details: Tools used:  Suture removal kit    Wound appearance:  No sign(s) of infection, nonpurulent, pink and clean    Number of sutures removed:  2  Post-procedure details:     Post-removal:  Band-Aid applied    Patient tolerance of procedure: Tolerated well, no immediate complications              Portions of the record may have been created with voice recognition software  Occasional wrong word or "sound a like" substitutions may have occurred due to the inherent limitations of voice recognition software  Read the chart carefully and recognize, using context, where substitutions have occurred

## 2023-01-25 DIAGNOSIS — N21.0 BLADDER CALCULUS: Primary | ICD-10-CM

## 2023-02-09 ENCOUNTER — APPOINTMENT (OUTPATIENT)
Dept: RADIOLOGY | Facility: MEDICAL CENTER | Age: 58
End: 2023-02-09

## 2023-02-09 DIAGNOSIS — N21.0 BLADDER CALCULUS: ICD-10-CM

## 2023-02-13 ENCOUNTER — TELEPHONE (OUTPATIENT)
Dept: UROLOGY | Facility: AMBULATORY SURGERY CENTER | Age: 58
End: 2023-02-13

## 2023-02-13 NOTE — TELEPHONE ENCOUNTER
Regarding: KUB  Contact: [phone]  ----- Message from Maggie Sanchez RN sent at 2/13/2023  7:38 AM EST -----  Please assist patient in rescheduling f/u     ----- Message from Chema Duran to [id number] [address] Mcody sent at 2/10/2023  5:28 PM -----   Hi  I need to reschedule Monday's appointment due to a death in the family  I already canceled it in My Chart       ----- Message -----       Renee Duran RN       Sent:1/25/2023  9:11 AM EST         To:Slick Farah    Subject:VÍCTOR RENEE order was placed in your chart  Please have this done prior to the appointment  Thanks  Marla Justin RN, BSN      ----- Message -----       From:Slick De La Garza       Sent:1/24/2023  7:09 PM EST         To:Clarita JOSEPH [id number]    Subject:VÍCTOR    Do I need to have my KUB test done prior to my upcoming appointment?

## 2023-03-30 ENCOUNTER — OFFICE VISIT (OUTPATIENT)
Dept: FAMILY MEDICINE CLINIC | Facility: CLINIC | Age: 58
End: 2023-03-30

## 2023-03-30 VITALS
WEIGHT: 223 LBS | SYSTOLIC BLOOD PRESSURE: 132 MMHG | TEMPERATURE: 96.8 F | HEART RATE: 80 BPM | BODY MASS INDEX: 29.55 KG/M2 | RESPIRATION RATE: 20 BRPM | DIASTOLIC BLOOD PRESSURE: 84 MMHG | HEIGHT: 73 IN | OXYGEN SATURATION: 94 %

## 2023-03-30 DIAGNOSIS — R03.0 ELEVATED BLOOD PRESSURE READING WITHOUT DIAGNOSIS OF HYPERTENSION: ICD-10-CM

## 2023-03-30 DIAGNOSIS — E78.01 FAMILIAL HYPERCHOLESTEROLEMIA: ICD-10-CM

## 2023-03-30 DIAGNOSIS — Z12.5 SCREENING FOR PROSTATE CANCER: ICD-10-CM

## 2023-03-30 DIAGNOSIS — Z00.00 WELL ADULT EXAM: Primary | ICD-10-CM

## 2023-03-30 PROBLEM — S83.242A OTHER TEAR OF MEDIAL MENISCUS, CURRENT INJURY, LEFT KNEE, INITIAL ENCOUNTER: Status: RESOLVED | Noted: 2022-10-21 | Resolved: 2023-03-30

## 2023-03-30 PROBLEM — Z01.818 PRE-OP EXAMINATION: Status: RESOLVED | Noted: 2022-11-14 | Resolved: 2023-03-30

## 2023-03-30 NOTE — PROGRESS NOTES
Name: Gaylord Epley      : 1965      MRN: [id number]  Encounter Provider: Prosper Madera MD  Encounter Date: 3/30/2023   Encounter department: [address] [place] [address]  Well adult exam    2  Familial hypercholesterolemia  -     Lipid panel  -     TSH, 3rd generation with Free T4 reflex    3  Elevated blood pressure reading without diagnosis of hypertension  -     Comprehensive metabolic panel  -     CBC and differential    4  Screening for prostate cancer  -     PSA, Total Screen; Future    Continue with current medications  Repeat fasting labs  Monitor BP at home  PRN Simethicone or Peppermint oil for bloating  Can try eating frequent small meals  OV 1 year  BMI Counseling: Body mass index is 29 42 kg/m²  The BMI is above normal  Nutrition recommendations include reducing portion sizes, decreasing overall calorie intake, consuming healthier snacks, moderation in carbohydrate intake, reducing intake of saturated fat and trans fat and reducing intake of cholesterol  Exercise recommendations include exercising 3-5 times per week  Subjective     59 year male here for wellness exam   Medications reviewed  Hospitalizations/surgeries/SH/FH reviewed see note  Familial hyperlipidemia mixed type on Atorvastatin 80 mg daily and 1 fish oil capsule/day  Prior treatment with Lopid and Fenofibrate  Pre treatment cholesterol 400 range and triglyceride range 600  Lipid profile 2022 cholesterol 238  Triglycerides 190  HDL 60     2022  LFTs normal     Lab Results   Component Value Date    WBC 6 31 2022    HGB 16 5 2022    HCT 49 9 (H) 2022    MCV 92 2022     2022     Lab Results   Component Value Date     2015    SODIUM 137 2022    K 4 2 2022     2022    CO2 27 2022    ANIONGAP 3 (L) 2015    AGAP 3 (L) 2022    BUN 19 2022    CREATININE 0 88 2022    GLUC 100 "02/14/2017    GLUF 106 (H) 11/12/2022    CALCIUM 9 3 11/12/2022    AST 19 11/12/2022    ALT 41 11/12/2022    ALKPHOS 82 11/12/2022    PROT 6 7 11/20/2015    TP 7 2 11/12/2022    BILITOT 0 54 11/20/2015    TBILI 0 59 11/12/2022    EGFR 94 11/12/2022     Lab Results   Component Value Date    CHOLESTEROL 238 (H) 02/05/2022    CHOLESTEROL 242 (H) 03/13/2021    CHOLESTEROL 292 (H) 01/04/2019     Lab Results   Component Value Date    HDL 60 02/05/2022    HDL 72 03/13/2021    HDL 61 (H) 01/04/2019     Lab Results   Component Value Date    TRIG 190 (H) 02/05/2022    TRIG 161 (H) 03/13/2021    TRIG 312 (H) 01/04/2019     Lab Results   Component Value Date    PSA 0 6 02/05/2022    PSA 0 4 03/13/2021    PSA 0 5 01/04/2019         Review of Systems   Constitutional: Negative for appetite change, chills, fever and unexpected weight change  HENT: Negative for congestion, ear pain, rhinorrhea, sinus pressure, sore throat and trouble swallowing  Eyes: Negative for visual disturbance  Respiratory: Negative for cough, shortness of breath and wheezing  Cardiovascular: Negative for chest pain, palpitations and leg swelling  Gastrointestinal: Negative for abdominal pain, blood in stool, constipation, diarrhea, nausea and vomiting  Status post cholecystectomy  History of colon polyps  06/2021 colonoscopy normal  \"tired\" after eating  Post prandial bloating  No history of lactose intolerance  Endocrine: Negative for polydipsia and polyuria  Genitourinary: Negative for difficulty urinating         06/2022 PVR 65 ML on daily use Cialis 04/2022 cystoscopy for bladder stone  02/2022 KUB Bilateral nephrolithiasis  Probable 13 mm bladder calculus  05/2021 MRI Stable size of a mildly complex right renal cyst  History of kidney stones followed by Urology  Prior surgery and lithotripsy  02/2017 CT scan of abdomen and pelvis bilateral renal cystic lesions  Stable small right hepatic lobe cysts 9 mm    3 mm calculus right " mid pole kidney  No hydronephrosis  Left kidney minute calculus fragments visible mid to lower pole  Status post left inguinal herniorrhaphy  Lab Results       Component                Value               Date                       PSA                      0 6                 02/05/2022                 PSA                      0 4                 03/13/2021                 PSA                      0 5                 01/04/2019                 Musculoskeletal: Positive for back pain  Negative for arthralgias and myalgias  S/p L knee arthroscopy 11/2022 Chronic right sided lower back with intermittent R leg pain  No R leg weakness or numbness  05/2021 MRI lumbar spine  Inferiorly extruded or possibly sequestered disc herniation at the L5-S1 level partially effacing the ventral epidural space and abutting the thecal sac and left S1 nerve  Degenerative disc disease at L4-5 with annular bulging and a small right foraminal disc protrusion  Moderate right foraminal narrowing with disc material abutting and slightly displacing the exiting nerve  Correlate for right L4 radiculopathy  At L3-4 there is moderate bilateral foraminal narrowing secondary to annular bulging and broad-based foraminal disc protrusions  09/2021 L5-S1 interlaminar lumbar GERARDO for right lumbar radiculopathy  02/2017 CT scan abdomen and pelvis severe chronic disc degeneration present lumbosacral junction  He completed PT  Skin: Negative for rash  Allergic/Immunologic: Negative for environmental allergies  Neurological: Negative for dizziness, weakness and headaches  Hematological: Negative for adenopathy  Does not bruise/bleed easily  Psychiatric/Behavioral: Negative for dysphoric mood and sleep disturbance         Past Medical History:   Diagnosis Date   • Acute sinusitis    • Allergic rhinitis    • Chronic sinusitis     last assessed 10/23/2012   • Hyperlipemia    • Kidney stone 5/2001   • Other tear of medial meniscus, current injury, left knee, initial encounter 10/21/2022     Past Surgical History:   Procedure Laterality Date   • CHOLECYSTECTOMY     • COLONOSCOPY N/A 1/22/2016    Procedure: COLONOSCOPY;  Surgeon: Guevara Miranda MD;  Location: [facility]; Service:    • CYSTOSCOPY      4/13/22   • HERNIA REPAIR      Inguinal   • KIDNEY STONE SURGERY      In your records   • LITHOTRIPSY      In your records   • ORTHOPEDIC SURGERY Right 1997    Knee   • NE ARTHRS KNE SURG W/MENISCECTOMY MED/LAT W/SHVG Left 11/16/2022    Procedure: ARTHROSCOPY KNEE with partial medial  MENISECTOMY and patellofemoral chondroplasty;  Surgeon: Олег Benson MD;  [facility];  Service: Orthopedics   • NE CYSTOURETHROSCOPY N/A 4/13/2022    Procedure: Parul Taylor;  Surgeon: Natasha Matthew MD;  [facility];  Service: Urology   • NE LITHOLAPAXY COMP/LG > 2 5 CM N/A 4/13/2022    Procedure: Guillaume Gerardo;  Surgeon: Natasha Matthew MD;  [facility];  Service: Urology   • SHOULDER ARTHROSCOPY Right      Family History   Problem Relation Age of Onset   • Nephrolithiasis Mother    • Hyperlipidemia Mother    • Hypertension Mother    • Urolithiasis Mother    • Hyperlipidemia Father    • Nephrolithiasis Brother    • Multiple sclerosis Sister    • Coronary artery disease Maternal Grandmother      Social History     Socioeconomic History   • Marital status: /Civil Union     Spouse name: None   • Number of children: None   • Years of education: None   • Highest education level: None   Occupational History     Employer: [employer]   Tobacco Use   • Smoking status: Never   • Smokeless tobacco: Never   Vaping Use   • Vaping Use: Never used   Substance and Sexual Activity   • Alcohol use:  Yes     Alcohol/week: 6 0 standard drinks     Types: 6 Cans of beer per week     Comment: social   • Drug use: Never   • Sexual activity: Yes     Partners: Female     Birth control/protection: None   Other Topics Concern   • None   Social History Narrative   • None     Social Determinants of Health     Financial Resource Strain: Not on file   Food Insecurity: Not on file   Transportation Needs: Not on file   Physical Activity: Not on file   Stress: Not on file   Social Connections: Not on file   Intimate Partner Violence: Not on file   Housing Stability: Not on file     Current Outpatient Medications on File Prior to Visit   Medication Sig   • Ascorbic Acid (Vitamin C) 500 MG CHEW Chew 1 Dose daily   • aspirin 81 MG tablet Take 81 mg by mouth daily  • atorvastatin (LIPITOR) 80 mg tablet TAKE 1 TABLET DAILY (Patient taking differently: daily at bedtime)   • b complex vitamins capsule Take 1 capsule by mouth daily  • Cholecalciferol (D3-1000) 25 MCG (1000 UT) capsule Take 2 capsules by mouth daily   • Loratadine 10 MG CAPS Take 1 capsule by mouth daily   • Magnesium 500 MG TABS Take 1 tablet by mouth daily   • Multiple Vitamin (MULTIVITAMIN) capsule Take 1 capsule by mouth daily  • Omega-3 Fatty Acids (fish oil) 1,000 mg Take 1,000 mg by mouth daily   • tadalafil (CIALIS) 5 MG tablet Take 1 tablet (5 mg total) by mouth daily at bedtime   • vitamin E, tocopherol, 400 units capsule Take 400 Units by mouth daily     • Zinc 50 MG TABS Take 1 tablet by mouth daily     No Known Allergies  Immunization History   Administered Date(s) Administered   • COVID-19 PFIZER VACCINE 0 3 ML IM 03/30/2021, 04/20/2021, 01/08/2022   • INFLUENZA 09/13/2018, 09/05/2021, 09/10/2022   • Influenza Quadrivalent Preservative Free 3 years and older IM 09/17/2015, 09/06/2017   • Influenza Quadrivalent, 6-35 Months IM 10/06/2016   • Influenza, recombinant, quadrivalent,injectable, preservative free 09/13/2019, 09/08/2020   • Influenza, seasonal, injectable 12/10/2012, 10/15/2013, 10/15/2013, 01/01/2014   • Tdap 11/06/2014   • Zoster Vaccine Recombinant 09/04/2020, 12/02/2020       Objective     /84 (BP Location: Left arm, Patient Position: Sitting, Cuff Size: Large) "Pulse 80   Temp (!) 96 8 °F (36 °C)   Resp 20   Ht 6' 1\" (1 854 m)   Wt 101 kg (223 lb)   SpO2 94%   BMI 29 42 kg/m²     BP Readings from Last 3 Encounters:   03/30/23 132/84   12/02/22 120/82   11/16/22 135/78     Wt Readings from Last 3 Encounters:   03/30/23 101 kg (223 lb)   12/02/22 99 8 kg (220 lb)   11/16/22 99 8 kg (220 lb)       Physical Exam  Vitals and nursing note reviewed  Constitutional:       General: He is not in acute distress  Appearance: He is well-developed  HENT:      Right Ear: Tympanic membrane and ear canal normal       Left Ear: Tympanic membrane and ear canal normal    Eyes:      General: No scleral icterus  Extraocular Movements: Extraocular movements intact  Conjunctiva/sclera: Conjunctivae normal       Pupils: Pupils are equal, round, and reactive to light  Neck:      Thyroid: No thyroid mass or thyromegaly  Vascular: No carotid bruit or JVD  Trachea: No tracheal deviation  Cardiovascular:      Rate and Rhythm: Normal rate and regular rhythm  Pulses:           Carotid pulses are 2+ on the right side and 2+ on the left side  Heart sounds: Normal heart sounds  No murmur heard  No gallop  Pulmonary:      Effort: Pulmonary effort is normal  No respiratory distress  Breath sounds: Normal breath sounds  No wheezing or rales  Abdominal:      General: Bowel sounds are normal  There is no distension or abdominal bruit  Palpations: Abdomen is soft  There is no hepatomegaly, splenomegaly or mass  Tenderness: There is no abdominal tenderness  There is no guarding or rebound  Musculoskeletal:      Right lower leg: No edema  Left lower leg: No edema  Lymphadenopathy:      Cervical: No cervical adenopathy  Upper Body:      Right upper body: No supraclavicular adenopathy  Left upper body: No supraclavicular adenopathy  Skin:     Findings: No rash  Nails: There is no clubbing     Neurological:      General: No " focal deficit present  Mental Status: He is alert and oriented to person, place, and time     Psychiatric:         Mood and Affect: Mood normal          Behavior: Behavior normal        Link Em MD

## 2023-04-08 LAB
ALBUMIN SERPL BCP-MCNC: 3.9 G/DL (ref 3.5–5)
ALP SERPL-CCNC: 88 U/L (ref 46–116)
ALT SERPL W P-5'-P-CCNC: 48 U/L (ref 12–78)
ANION GAP SERPL CALCULATED.3IONS-SCNC: 0 MMOL/L (ref 4–13)
AST SERPL W P-5'-P-CCNC: 19 U/L (ref 5–45)
BASOPHILS # BLD AUTO: 0.04 THOUSANDS/ÂΜL (ref 0–0.1)
BASOPHILS NFR BLD AUTO: 1 % (ref 0–1)
BILIRUB SERPL-MCNC: 0.64 MG/DL (ref 0.2–1)
BUN SERPL-MCNC: 19 MG/DL (ref 5–25)
CALCIUM SERPL-MCNC: 9.1 MG/DL (ref 8.3–10.1)
CHLORIDE SERPL-SCNC: 106 MMOL/L (ref 96–108)
CHOLEST SERPL-MCNC: 257 MG/DL
CO2 SERPL-SCNC: 29 MMOL/L (ref 21–32)
CREAT SERPL-MCNC: 0.93 MG/DL (ref 0.6–1.3)
EOSINOPHIL # BLD AUTO: 0.11 THOUSAND/ÂΜL (ref 0–0.61)
EOSINOPHIL NFR BLD AUTO: 2 % (ref 0–6)
ERYTHROCYTE [DISTWIDTH] IN BLOOD BY AUTOMATED COUNT: 12.5 % (ref 11.6–15.1)
GFR SERPL CREATININE-BSD FRML MDRD: 89 ML/MIN/1.73SQ M
GLUCOSE P FAST SERPL-MCNC: 105 MG/DL (ref 65–99)
HCT VFR BLD AUTO: 50 % (ref 36.5–49.3)
HDLC SERPL-MCNC: 67 MG/DL
HGB BLD-MCNC: 17 G/DL (ref 12–17)
IMM GRANULOCYTES # BLD AUTO: 0.03 THOUSAND/UL (ref 0–0.2)
IMM GRANULOCYTES NFR BLD AUTO: 1 % (ref 0–2)
LDLC SERPL CALC-MCNC: 134 MG/DL (ref 0–100)
LYMPHOCYTES # BLD AUTO: 1.7 THOUSANDS/ÂΜL (ref 0.6–4.47)
LYMPHOCYTES NFR BLD AUTO: 26 % (ref 14–44)
MCH RBC QN AUTO: 30.4 PG (ref 26.8–34.3)
MCHC RBC AUTO-ENTMCNC: 34 G/DL (ref 31.4–37.4)
MCV RBC AUTO: 89 FL (ref 82–98)
MONOCYTES # BLD AUTO: 0.45 THOUSAND/ÂΜL (ref 0.17–1.22)
MONOCYTES NFR BLD AUTO: 7 % (ref 4–12)
NEUTROPHILS # BLD AUTO: 4.11 THOUSANDS/ÂΜL (ref 1.85–7.62)
NEUTS SEG NFR BLD AUTO: 63 % (ref 43–75)
NONHDLC SERPL-MCNC: 190 MG/DL
NRBC BLD AUTO-RTO: 0 /100 WBCS
PLATELET # BLD AUTO: 206 THOUSANDS/UL (ref 149–390)
PMV BLD AUTO: 9.4 FL (ref 8.9–12.7)
POTASSIUM SERPL-SCNC: 4.7 MMOL/L (ref 3.5–5.3)
PROT SERPL-MCNC: 7.3 G/DL (ref 6.4–8.4)
RBC # BLD AUTO: 5.6 MILLION/UL (ref 3.88–5.62)
SODIUM SERPL-SCNC: 135 MMOL/L (ref 135–147)
TRIGL SERPL-MCNC: 282 MG/DL
TSH SERPL DL<=0.05 MIU/L-ACNC: 1.63 UIU/ML (ref 0.45–4.5)
WBC # BLD AUTO: 6.44 THOUSAND/UL (ref 4.31–10.16)

## 2023-06-08 ENCOUNTER — OFFICE VISIT (OUTPATIENT)
Dept: UROLOGY | Facility: AMBULATORY SURGERY CENTER | Age: 58
End: 2023-06-08
Payer: COMMERCIAL

## 2023-06-08 VITALS
DIASTOLIC BLOOD PRESSURE: 80 MMHG | HEIGHT: 73 IN | SYSTOLIC BLOOD PRESSURE: 142 MMHG | RESPIRATION RATE: 18 BRPM | OXYGEN SATURATION: 96 % | BODY MASS INDEX: 29.55 KG/M2 | HEART RATE: 85 BPM | WEIGHT: 223 LBS

## 2023-06-08 DIAGNOSIS — N52.9 ERECTILE DYSFUNCTION, UNSPECIFIED ERECTILE DYSFUNCTION TYPE: ICD-10-CM

## 2023-06-08 DIAGNOSIS — N20.0 NEPHROLITHIASIS: ICD-10-CM

## 2023-06-08 DIAGNOSIS — N40.1 BENIGN PROSTATIC HYPERPLASIA WITH LOWER URINARY TRACT SYMPTOMS, SYMPTOM DETAILS UNSPECIFIED: Primary | ICD-10-CM

## 2023-06-08 DIAGNOSIS — Z12.5 SCREENING FOR PROSTATE CANCER: ICD-10-CM

## 2023-06-08 PROCEDURE — 99213 OFFICE O/P EST LOW 20 MIN: CPT | Performed by: NURSE PRACTITIONER

## 2023-06-08 NOTE — PROGRESS NOTES
6/8/2023    [place]  1965  [id number]      Assessment  -Bladder calculus s/p cystolitholapaxy (4/13/2022)  -Prostate cancer screening  -Nephrolithiasis   -BPH with lower urinary tract symptoms    Discussion/Plan  Cruz Murrell is a 59 y o  male being managed by Dr Roxanne Telles      1  Prostate cancer screening-recent PSA 0 5, previously 0 6  No abnormal findings noted on EVA today  Continue annual prostate cancer screening  2  Nephrolithiasis, history of bladder calculus s/p cystolitholapaxy (4/13/2022)- discussed results of recent KUB which identified bilateral punctate nonobstructing renal calculi, largest measuring 2 mm in right kidney  Patient currently asymptomatic  We will continue to monitor  Reviewed dietary recommendations  Repeat KUB in 1 year  3  BPH with lower urinary tract symptoms-patient has no urinary complaints at this time  He will remain on Cialis 5 mg daily which is managed by his PCP  Follow-up in 1 year with PSA, EVA, and KUB  He was advised to call sooner with any questions or issues     -All questions answered, patient agrees with plan      History of Present Illness  59 y o  male with a history of prostate cancer screening and bladder calculus presents today for follow up  He was last seen in the office in June 2022  He has a history of bladder calculus status post cystoscopy litholapaxy performed on 4/13/2022  KUB identified bilateral nonobstructing renal calculi  He remains asymptomatic  Patient denies any recent stone episodes or flank pain  Last PSA was 0 6  He denies any strong family history of prostate malignancy  Patient remains on Cialis 5 mg daily which is managed by his PCP  He denies any lower urinary tract symptoms, gross hematuria, or dysuria  Review of Systems  Review of Systems   Constitutional: Negative  HENT: Negative  Respiratory: Negative  Cardiovascular: Negative  Gastrointestinal: Negative      Genitourinary: Negative for decreased urine volume, difficulty urinating, dysuria, flank pain, frequency, hematuria and urgency  Musculoskeletal: Negative  Skin: Negative  Neurological: Negative  Psychiatric/Behavioral: Negative  Past Medical History  Past Medical History:   Diagnosis Date   • Acute sinusitis    • Allergic rhinitis    • Chronic sinusitis     last assessed 10/23/2012   • Hyperlipemia    • Kidney stone 5/2001   • Other tear of medial meniscus, current injury, left knee, initial encounter 10/21/2022       Past Social History  Past Surgical History:   Procedure Laterality Date   • CHOLECYSTECTOMY     • COLONOSCOPY N/A 1/22/2016    Procedure: COLONOSCOPY;  Surgeon: Adali Early MD;  [facility];   Service:    • CYSTOSCOPY      4/13/22   • HERNIA REPAIR      Inguinal   • KIDNEY STONE SURGERY      In your records   • LITHOTRIPSY      In your records   • ORTHOPEDIC SURGERY Right 1997    Knee   • MO ARTHRS KNE SURG W/MENISCECTOMY MED/LAT W/SHVG Left 11/16/2022    Procedure: ARTHROSCOPY KNEE with partial medial  MENISECTOMY and patellofemoral chondroplasty;  Surgeon: Jin Tapia MD;  [facility];  Service: Orthopedics   • MO CYSTOURETHROSCOPY N/A 4/13/2022    Procedure: Oleary Fee;  Surgeon: Carlin Arguello MD;  [facility];  Service: Urology   • MO LITHOLAPAXY COMP/LG > 2 5 CM N/A 4/13/2022    Procedure: Oneal Lance;  Surgeon: Carlin Arguello MD;  [facility];  Service: Urology   • SHOULDER ARTHROSCOPY Right        Past Family History  Family History   Problem Relation Age of Onset   • Nephrolithiasis Mother    • Hyperlipidemia Mother    • Hypertension Mother    • Urolithiasis Mother    • Hyperlipidemia Father    • Nephrolithiasis Brother    • Multiple sclerosis Sister    • Coronary artery disease Maternal Grandmother        Past Social history  Social History     Socioeconomic History   • Marital status: /Civil Union     Spouse name: Not on file   • Number of children: Not on file   • Years of education: Not on file   • Highest education level: Not on file   Occupational History     Employer: [employer]   Tobacco Use   • Smoking status: Never   • Smokeless tobacco: Never   Vaping Use   • Vaping Use: Never used   Substance and Sexual Activity   • Alcohol use: Yes     Alcohol/week: 6 0 standard drinks of alcohol     Types: 6 Cans of beer per week     Comment: social   • Drug use: Never   • Sexual activity: Yes     Partners: Female     Birth control/protection: None   Other Topics Concern   • Not on file   Social History Narrative   • Not on file     Social Determinants of Health     Financial Resource Strain: Not on file   Food Insecurity: Not on file   Transportation Needs: Not on file   Physical Activity: Not on file   Stress: Not on file   Social Connections: Not on file   Intimate Partner Violence: Not on file   Housing Stability: Not on file       Current Medications  Current Outpatient Medications   Medication Sig Dispense Refill   • Ascorbic Acid (Vitamin C) 500 MG CHEW Chew 1 Dose daily     • aspirin 81 MG tablet Take 81 mg by mouth daily  • atorvastatin (LIPITOR) 80 mg tablet TAKE 1 TABLET DAILY (Patient taking differently: daily at bedtime) 90 tablet 3   • b complex vitamins capsule Take 1 capsule by mouth daily  • Cholecalciferol (D3-1000) 25 MCG (1000 UT) capsule Take 2 capsules by mouth daily     • Loratadine 10 MG CAPS Take 1 capsule by mouth daily     • Magnesium 500 MG TABS Take 1 tablet by mouth daily     • Multiple Vitamin (MULTIVITAMIN) capsule Take 1 capsule by mouth daily  • Omega-3 Fatty Acids (fish oil) 1,000 mg Take 1,000 mg by mouth daily     • tadalafil (CIALIS) 5 MG tablet Take 1 tablet (5 mg total) by mouth daily at bedtime 90 tablet 3   • vitamin E, tocopherol, 400 units capsule Take 400 Units by mouth daily  • Zinc 50 MG TABS Take 1 tablet by mouth daily       No current facility-administered medications for this visit  Allergies  No Known Allergies    Past Medical History, Social History, Family History, medications and allergies were reviewed  Vitals  There were no vitals filed for this visit  Physical Exam  Physical Exam  Constitutional:       Appearance: Normal appearance  He is well-developed  HENT:      Head: Normocephalic  Eyes:      Pupils: Pupils are equal, round, and reactive to light  Pulmonary:      Effort: Pulmonary effort is normal    Abdominal:      Palpations: Abdomen is soft  Tenderness: There is no right CVA tenderness or left CVA tenderness  Genitourinary:     Prostate: Normal       Rectum: Normal       Comments: Prostate 50 g, smooth, nontender, no nodules  Musculoskeletal:         General: Normal range of motion  Cervical back: Normal range of motion  Skin:     General: Skin is warm and dry  Neurological:      General: No focal deficit present  Mental Status: He is alert and oriented to person, place, and time  Psychiatric:         Mood and Affect: Mood normal          Behavior: Behavior normal          Thought Content: Thought content normal          Judgment: Judgment normal          Results    I have personally reviewed all pertinent lab results and reviewed with patient  Lab Results   Component Value Date    PSA 0 5 04/08/2023    PSA 0 6 02/05/2022    PSA 0 4 03/13/2021     Lab Results   Component Value Date    BUN 19 04/08/2023    CALCIUM 9 1 04/08/2023     04/08/2023    CO2 29 04/08/2023    CREATININE 0 93 04/08/2023    GLUCOSE 94 11/20/2015    K 4 7 04/08/2023     11/20/2015     Lab Results   Component Value Date    HCT 50 0 (H) 04/08/2023    HGB 17 0 04/08/2023    MCV 89 04/08/2023     04/08/2023    WBC 6 44 04/08/2023     No results found for this or any previous visit (from the past 1 hour(s))

## 2023-10-21 DIAGNOSIS — N40.1 BENIGN PROSTATIC HYPERPLASIA WITH NOCTURIA: ICD-10-CM

## 2023-10-21 DIAGNOSIS — R35.1 BENIGN PROSTATIC HYPERPLASIA WITH NOCTURIA: ICD-10-CM

## 2023-10-22 DIAGNOSIS — R35.1 BENIGN PROSTATIC HYPERPLASIA WITH NOCTURIA: ICD-10-CM

## 2023-10-22 DIAGNOSIS — E78.01 FAMILIAL HYPERCHOLESTEROLEMIA: ICD-10-CM

## 2023-10-22 DIAGNOSIS — N40.1 BENIGN PROSTATIC HYPERPLASIA WITH NOCTURIA: ICD-10-CM

## 2023-10-23 ENCOUNTER — TELEPHONE (OUTPATIENT)
Age: 58
End: 2023-10-23

## 2023-10-23 RX ORDER — TADALAFIL 5 MG/1
5 TABLET ORAL
Qty: 90 TABLET | Refills: 0 | Status: SHIPPED | OUTPATIENT
Start: 2023-10-23

## 2023-10-23 RX ORDER — ATORVASTATIN CALCIUM 80 MG/1
80 TABLET, FILM COATED ORAL DAILY
Qty: 90 TABLET | Refills: 0 | Status: SHIPPED | OUTPATIENT
Start: 2023-10-23

## 2023-10-23 RX ORDER — TADALAFIL 5 MG/1
5 TABLET ORAL
Qty: 90 TABLET | Refills: 3 | Status: SHIPPED | OUTPATIENT
Start: 2023-10-23

## 2023-11-06 ENCOUNTER — OFFICE VISIT (OUTPATIENT)
Dept: URGENT CARE | Facility: MEDICAL CENTER | Age: 58
End: 2023-11-06
Payer: COMMERCIAL

## 2023-11-06 ENCOUNTER — APPOINTMENT (OUTPATIENT)
Dept: RADIOLOGY | Facility: MEDICAL CENTER | Age: 58
End: 2023-11-06
Payer: COMMERCIAL

## 2023-11-06 VITALS
HEART RATE: 101 BPM | OXYGEN SATURATION: 93 % | DIASTOLIC BLOOD PRESSURE: 88 MMHG | WEIGHT: 223 LBS | BODY MASS INDEX: 29.55 KG/M2 | HEIGHT: 73 IN | TEMPERATURE: 98 F | SYSTOLIC BLOOD PRESSURE: 148 MMHG | RESPIRATION RATE: 20 BRPM

## 2023-11-06 DIAGNOSIS — J20.9 ACUTE BRONCHITIS, UNSPECIFIED ORGANISM: ICD-10-CM

## 2023-11-06 DIAGNOSIS — J18.9 PNEUMONIA OF RIGHT LOWER LOBE DUE TO INFECTIOUS ORGANISM: Primary | ICD-10-CM

## 2023-11-06 LAB
SARS-COV-2 AG UPPER RESP QL IA: NEGATIVE
VALID CONTROL: NORMAL

## 2023-11-06 PROCEDURE — 71046 X-RAY EXAM CHEST 2 VIEWS: CPT

## 2023-11-06 PROCEDURE — 87811 SARS-COV-2 COVID19 W/OPTIC: CPT | Performed by: PHYSICIAN ASSISTANT

## 2023-11-06 PROCEDURE — 99213 OFFICE O/P EST LOW 20 MIN: CPT | Performed by: PHYSICIAN ASSISTANT

## 2023-11-06 RX ORDER — ALBUTEROL SULFATE 90 UG/1
2 AEROSOL, METERED RESPIRATORY (INHALATION) EVERY 4 HOURS PRN
Qty: 8.5 G | Refills: 1 | Status: SHIPPED | OUTPATIENT
Start: 2023-11-06 | End: 2023-11-13

## 2023-11-06 RX ORDER — AZITHROMYCIN 250 MG/1
TABLET, FILM COATED ORAL
Qty: 6 TABLET | Refills: 0 | Status: SHIPPED | OUTPATIENT
Start: 2023-11-06 | End: 2023-11-10

## 2023-11-06 RX ORDER — BENZONATATE 200 MG/1
200 CAPSULE ORAL 3 TIMES DAILY PRN
Qty: 20 CAPSULE | Refills: 0 | Status: SHIPPED | OUTPATIENT
Start: 2023-11-06

## 2023-11-06 NOTE — PATIENT INSTRUCTIONS
1. Use albuterol inhaler as directed. 2. Use over-the-counter plain Mucinex or Robitussin for phlegm relief. 3. Increase oral fluids. 4. Use over-the-counter acetaminophen as needed for fever pain. 5. Go to the ER with any worsening symptoms. 6.  Follow-up with primary care in 7 to 10 days for any persistent symptoms.

## 2023-11-06 NOTE — LETTER
November 6, 2023     Patient: Angel Snow   YOB: 1965   Date of Visit: 11/6/2023       To Whom It May Concern: It is my medical opinion that Patti Friends should be excused for his absence from work through 11/11/2023. If you have any questions or concerns, please don't hesitate to call.          Sincerely,        Faustino Orozco PA-C    CC: No Recipients

## 2023-11-06 NOTE — PROGRESS NOTES
[facility]        NAME: Angel Snow is a 59 y.o. male  : 1965    MRN: [id number]  DATE: 2023  TIME: 9:38 AM    Assessment and Plan   Pneumonia of right lower lobe due to infectious organism [J18.9]  1. Pneumonia of right lower lobe due to infectious organism  XR chest pa & lateral    Poct Covid 19 Rapid Antigen Test    azithromycin (ZITHROMAX) 250 mg tablet    albuterol (ProAir HFA) 90 mcg/act inhaler    benzonatate (TESSALON) 200 MG capsule            Patient Instructions     1. Use albuterol inhaler as directed. 2. Use over-the-counter plain Mucinex or Robitussin for phlegm relief. 3. Increase oral fluids. 4. Use over-the-counter acetaminophen as needed for fever pain. 5. Go to the ER with any worsening symptoms. 6.  Follow-up with primary care in 7 to 10 days for any persistent symptoms. Chief Complaint     Chief Complaint   Patient presents with    Earache     Bilateral ear pain     Shortness of Breath     Shortness of breath with exertion and while laying flat; associated with chest congestion, post nasal drip         History of Present Illness       55-year-old male patient with a 4 to 5-day history of worsening productive cough, sore throat, head congestion, chest tightness, mild shortness of breath. Patient has marked fatigue. Patient has chills but has not registered a fever at home. Patient did not do any home testing. Shortness of Breath  The current episode started in the past 7 days. The problem occurs constantly. The problem has been gradually worsening since onset. Associated symptoms include coughing, fatigue and a sore throat. The symptoms are aggravated by URIs, any activity and lying flat. Earache   Associated symptoms include coughing, headaches and a sore throat. Review of Systems   Review of Systems   Constitutional:  Positive for chills and fatigue. HENT:  Positive for congestion, ear pain and sore throat.     Respiratory: Positive for cough, chest tightness and shortness of breath. Neurological:  Positive for headaches. Current Medications       Current Outpatient Medications:     albuterol (ProAir HFA) 90 mcg/act inhaler, Inhale 2 puffs every 4 (four) hours as needed for wheezing or shortness of breath for up to 7 days, Disp: 8.5 g, Rfl: 1    Ascorbic Acid (Vitamin C) 500 MG CHEW, Chew 1 Dose daily, Disp: , Rfl:     aspirin 81 MG tablet, Take 81 mg by mouth daily. , Disp: , Rfl:     atorvastatin (LIPITOR) 80 mg tablet, Take 1 tablet (80 mg total) by mouth daily, Disp: 90 tablet, Rfl: 0    azithromycin (ZITHROMAX) 250 mg tablet, Take 2 tablets today then 1 tablet daily x 4 days, Disp: 6 tablet, Rfl: 0    b complex vitamins capsule, Take 1 capsule by mouth daily. , Disp: , Rfl:     benzonatate (TESSALON) 200 MG capsule, Take 1 capsule (200 mg total) by mouth 3 (three) times a day as needed for cough, Disp: 20 capsule, Rfl: 0    Cholecalciferol (D3-1000) 25 MCG (1000 UT) capsule, Take 2 capsules by mouth daily, Disp: , Rfl:     Loratadine 10 MG CAPS, Take 1 capsule by mouth daily, Disp: , Rfl:     Magnesium 500 MG TABS, Take 1 tablet by mouth daily, Disp: , Rfl:     Multiple Vitamin (MULTIVITAMIN) capsule, Take 1 capsule by mouth daily. , Disp: , Rfl:     tadalafil (CIALIS) 5 MG tablet, Take 1 tablet (5 mg total) by mouth daily at bedtime, Disp: 90 tablet, Rfl: 0    Omega-3 Fatty Acids (fish oil) 1,000 mg, Take 1,000 mg by mouth daily, Disp: , Rfl:     tadalafil (CIALIS) 5 MG tablet, TAKE 1 TABLET DAILY AT BEDTIME, Disp: 90 tablet, Rfl: 3    vitamin E, tocopherol, 400 units capsule, Take 400 Units by mouth daily. , Disp: , Rfl:     Zinc 50 MG TABS, Take 1 tablet by mouth daily, Disp: , Rfl:     Current Allergies     Allergies as of 11/06/2023    (No Known Allergies)            The following portions of the patient's history were reviewed and updated as appropriate: allergies, current medications, past family history, past medical history, past social history, past surgical history and problem list.     Past Medical History:   Diagnosis Date    Acute sinusitis     Allergic rhinitis     Chronic sinusitis     last assessed 10/23/2012    Hyperlipemia     Kidney stone 5/2001    Other tear of medial meniscus, current injury, left knee, initial encounter 10/21/2022       Past Surgical History:   Procedure Laterality Date    CHOLECYSTECTOMY      COLONOSCOPY N/A 1/22/2016    Procedure: COLONOSCOPY;  Surgeon: Marquita Bosworth, MD;  [facility]: [facility]; Service:     CYSTOSCOPY      4/13/22    HERNIA REPAIR      Inguinal    KIDNEY STONE SURGERY      In your records    LITHOTRIPSY      In your records    ORTHOPEDIC SURGERY Right 1997    Knee    TN ARTHRS KNE SURG W/MENISCECTOMY MED/LAT W/SHVG Left 11/16/2022    Procedure: ARTHROSCOPY KNEE with partial medial  MENISECTOMY and patellofemoral chondroplasty;  Surgeon: Vaishnavi Gay MD;  [facility];  Service: Orthopedics    TN CYSTOURETHROSCOPY N/A 4/13/2022    Procedure: Mainor Lopez;  Surgeon: Fran Reynolds MD;  [facility];  Service: Urology    TN LITHOLAPAXY COMP/LG > 2.5 CM N/A 4/13/2022    Procedure: Astrid Hartmann;  Surgeon: Fran Reynolds MD;  [facility];  Service: Urology    SHOULDER ARTHROSCOPY Right        Family History   Problem Relation Age of Onset    Nephrolithiasis Mother     Hyperlipidemia Mother     Hypertension Mother     Urolithiasis Mother     Hyperlipidemia Father     Nephrolithiasis Brother     Multiple sclerosis Sister     Coronary artery disease Maternal Grandmother          Medications have been verified. Objective   /88   Pulse 101   Temp 98 °F (36.7 °C)   Resp 20   Ht 6' 1" (1.854 m)   Wt 101 kg (223 lb)   SpO2 93%   BMI 29.42 kg/m²        Physical Exam     Physical Exam  Vitals and nursing note reviewed. Constitutional:       General: He is not in acute distress. Appearance: Normal appearance.  He is not ill-appearing. HENT:      Head: Normocephalic. Right Ear: Tympanic membrane normal.      Left Ear: Tympanic membrane normal.      Nose: Congestion present. Mouth/Throat:      Mouth: Mucous membranes are moist.      Pharynx: Oropharynx is clear. Eyes:      Conjunctiva/sclera: Conjunctivae normal.      Pupils: Pupils are equal, round, and reactive to light. Cardiovascular:      Rate and Rhythm: Normal rate and regular rhythm. Pulses: Normal pulses. Pulmonary:      Effort: Pulmonary effort is normal.      Breath sounds: Examination of the right-middle field reveals rhonchi. Examination of the right-lower field reveals rhonchi. Wheezing and rhonchi present. Chest:      Chest wall: No tenderness. Abdominal:      Tenderness: There is no abdominal tenderness. Musculoskeletal:         General: Normal range of motion. Cervical back: Normal range of motion and neck supple. Skin:     General: Skin is warm and dry. Capillary Refill: Capillary refill takes less than 2 seconds. Neurological:      Mental Status: He is alert and oriented to person, place, and time. Psychiatric:         Mood and Affect: Mood normal.         Behavior: Behavior normal.       Preliminary interpretation of chest x-ray:  Probable infiltrate right middle or right lower lobe. Answers submitted by the patient for this visit:  Shortness of Breath Questionnaire (Submitted on 11/6/2023)  Chief Complaint: Shortness of breath  Chronicity: new  Episode duration: 1 Minutes  sputum production: Yes  swollen glands:  Yes

## 2023-11-10 ENCOUNTER — TELEPHONE (OUTPATIENT)
Dept: OTHER | Facility: HOSPITAL | Age: 58
End: 2023-11-10

## 2023-11-10 ENCOUNTER — RA CDI HCC (OUTPATIENT)
Dept: OTHER | Facility: HOSPITAL | Age: 58
End: 2023-11-10

## 2023-11-10 NOTE — TELEPHONE ENCOUNTER
Patient called stating he received a call from office. I did see his appointment time changed so I did make patient aware of that. 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 If the the appointment time change was not what call was about please follow up with patient as warm transfer was attempted but unsuccessful.

## 2023-11-10 NOTE — PROGRESS NOTES
[facility] coding opportunities       Chart reviewed, no opportunity found: CHART REVIEWED, NO OPPORTUNITY FOUND        Patients Insurance        Commercial Insurance: Rojas Martin

## 2023-11-10 NOTE — TELEPHONE ENCOUNTER
Pt called in stating he missed a call from Hussain Malloy regarding his appt. Warm transferred him over to her in the office.

## 2023-11-15 ENCOUNTER — OFFICE VISIT (OUTPATIENT)
Dept: FAMILY MEDICINE CLINIC | Facility: CLINIC | Age: 58
End: 2023-11-15
Payer: COMMERCIAL

## 2023-11-15 VITALS
BODY MASS INDEX: 30.75 KG/M2 | RESPIRATION RATE: 18 BRPM | WEIGHT: 232 LBS | TEMPERATURE: 97.7 F | HEART RATE: 88 BPM | DIASTOLIC BLOOD PRESSURE: 78 MMHG | SYSTOLIC BLOOD PRESSURE: 136 MMHG | HEIGHT: 73 IN | OXYGEN SATURATION: 95 %

## 2023-11-15 DIAGNOSIS — J40 BRONCHITIS: Primary | ICD-10-CM

## 2023-11-15 PROCEDURE — 99213 OFFICE O/P EST LOW 20 MIN: CPT | Performed by: STUDENT IN AN ORGANIZED HEALTH CARE EDUCATION/TRAINING PROGRAM

## 2023-11-15 RX ORDER — AMPICILLIN TRIHYDRATE 250 MG
CAPSULE ORAL
COMMUNITY
Start: 2023-07-01

## 2023-11-15 RX ORDER — FLUTICASONE PROPIONATE 50 MCG
1 SPRAY, SUSPENSION (ML) NASAL DAILY
Qty: 15.8 ML | Refills: 1 | Status: SHIPPED | OUTPATIENT
Start: 2023-11-15

## 2023-11-15 NOTE — PROGRESS NOTES
Name: Berenice Harden      : 1965      MRN: [id number]  Encounter Provider: Simona Kenney MD  Encounter Date: 11/15/2023   Encounter department: [address] [place] [address]. Bronchitis  Assessment & Plan:  Improving. Patient seen at urgent care and received a clinical diagnosis of pneumonia. Cxr does not support diagnosis. No CBC collected. Likely more viral bronchitis. Patient finished 5 day course of azithromycin. Is not currently using albuterol. Reports residual ear discomfort and post nasal drip. Supportive therapy advised. RTW note provided. Orders:  -     fluticasone (FLONASE) 50 mcg/act nasal spray; 1 spray into each nostril daily           Subjective     HPI  This is a very pleasant 59 y.o. male who presents to the clinic for follow up urgent care visit. Patient was seen by urgent care on  for URI symptoms. Patient reports onset of symptoms approximately 12 days ago. Patient reports developing shortness of breath prior to attending urgent care. Subsequent CXR revealed findings of linear atelectasis. Patient diagnosed with pneumonia and initiated on supportive therapy and azithromycin course. COVID negative in ED. Currently patient reports residual post nasal drip, productive cough of yellow tinged sputum, ear pressure and discomfort of right ear. Is currently not taking any medication. Did take pseudophed last night with relief of sinus pressure. Denies fever, chills, dyspnea, n/v/d. 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 Review of Systems   Constitutional:  Negative for chills and fever. HENT:  Negative for congestion, rhinorrhea and sinus pressure. Respiratory:  Negative for chest tightness, shortness of breath and wheezing. Cardiovascular:  Negative for chest pain and palpitations. Gastrointestinal:  Negative for abdominal pain, nausea and vomiting. Endocrine: Negative for polyuria. Genitourinary:  Negative for difficulty urinating, dysuria, frequency and urgency. Musculoskeletal:  Negative for myalgias. Neurological:  Negative for dizziness, syncope and light-headedness. Psychiatric/Behavioral:  Negative for dysphoric mood. Past Medical History:   Diagnosis Date    Acute sinusitis     Allergic rhinitis     Chronic sinusitis     last assessed 10/23/2012    Hyperlipemia     Kidney stone 5/2001    Other tear of medial meniscus, current injury, left knee, initial encounter 10/21/2022     Past Surgical History:   Procedure Laterality Date    CHOLECYSTECTOMY      COLONOSCOPY N/A 1/22/2016    Procedure: COLONOSCOPY;  Surgeon: Dianne Ziegler MD;  [facility];   Service:     CYSTOSCOPY      4/13/22    HERNIA REPAIR      Inguinal    KIDNEY STONE SURGERY      In your records    LITHOTRIPSY      In your records    ORTHOPEDIC SURGERY Right 1997    Knee    CT ARTHRS KNE SURG W/MENISCECTOMY MED/LAT W/SHVG Left 11/16/2022    Procedure: ARTHROSCOPY KNEE with partial medial  MENISECTOMY and patellofemoral chondroplasty;  Surgeon: Dwaine Gtz MD;  [facility];  Service: Orthopedics    CT CYSTOURETHROSCOPY N/A 4/13/2022    Procedure: Shelley Kilpatrick;  Surgeon: Justin Hernandez MD;  [facility];  Service: Urology    CT LITHOLAPAXY COMP/LG > 2.5 CM N/A 4/13/2022    Procedure: Wendy Cain;  Surgeon: Justin Hernandez MD;  [facility];  Service: Urology    SHOULDER ARTHROSCOPY Right      Family History   Problem Relation Age of Onset    Nephrolithiasis Mother     Hyperlipidemia Mother     Hypertension Mother     Urolithiasis Mother     Hyperlipidemia Father     Nephrolithiasis Brother     Multiple sclerosis Sister     Coronary artery disease Maternal Grandmother      Social History     Socioeconomic History    Marital status: /Civil Union     Spouse name: None    Number of children: None    Years of education: None    Highest education level: None Occupational History     Employer: [employer]   Tobacco Use    Smoking status: Never    Smokeless tobacco: Never   Vaping Use    Vaping Use: Never used   Substance and Sexual Activity    Alcohol use: Yes     Alcohol/week: 6.0 standard drinks of alcohol     Types: 6 Cans of beer per week     Comment: social    Drug use: Never    Sexual activity: Yes     Partners: Female     Birth control/protection: None   Other Topics Concern    None   Social History Narrative    None     Social Determinants of Health     Financial Resource Strain: Not on file   Food Insecurity: Not on file   Transportation Needs: Not on file   Physical Activity: Not on file   Stress: Not on file   Social Connections: Not on file   Intimate Partner Violence: Not on file   Housing Stability: Not on file     Current Outpatient Medications on File Prior to Visit   Medication Sig    Ascorbic Acid (Vitamin C) 500 MG CHEW Chew 1 Dose daily    aspirin 81 MG tablet Take 81 mg by mouth daily. atorvastatin (LIPITOR) 80 mg tablet Take 1 tablet (80 mg total) by mouth daily    b complex vitamins capsule Take 1 capsule by mouth daily. benzonatate (TESSALON) 200 MG capsule Take 1 capsule (200 mg total) by mouth 3 (three) times a day as needed for cough    Cholecalciferol (D3-1000) 25 MCG (1000 UT) capsule Take 2 capsules by mouth daily    Cinnamon 500 MG capsule     Loratadine 10 MG CAPS Take 1 capsule by mouth daily    Magnesium 500 MG TABS Take 1 tablet by mouth daily    Multiple Vitamin (MULTIVITAMIN) capsule Take 1 capsule by mouth daily. Omega-3 Fatty Acids (fish oil) 1,000 mg Take 1,000 mg by mouth daily    tadalafil (CIALIS) 5 MG tablet TAKE 1 TABLET DAILY AT BEDTIME    vitamin E, tocopherol, 400 units capsule Take 400 Units by mouth daily.     Zinc 50 MG TABS Take 1 tablet by mouth daily    [DISCONTINUED] tadalafil (CIALIS) 5 MG tablet Take 1 tablet (5 mg total) by mouth daily at bedtime     No Known Allergies  Immunization History   Administered Date(s) Administered    COVID-19 PFIZER VACCINE 0.3 ML IM 03/30/2021, 04/20/2021, 01/08/2022    INFLUENZA 09/13/2018, 09/05/2021, 09/10/2022, 09/17/2023    Influenza Quadrivalent Preservative Free 3 years and older IM 09/17/2015, 09/06/2017    Influenza Quadrivalent, 6-35 Months IM 10/06/2016    Influenza, recombinant, quadrivalent,injectable, preservative free 09/13/2019, 09/08/2020    Influenza, seasonal, injectable 12/10/2012, 10/15/2013, 10/15/2013, 01/01/2014    Tdap 11/06/2014    Zoster Vaccine Recombinant 09/04/2020, 12/02/2020       Objective     /78 (BP Location: Left arm, Patient Position: Sitting, Cuff Size: Large)   Pulse 88   Temp 97.7 °F (36.5 °C)   Resp 18   Ht 6' 1" (1.854 m)   Wt 105 kg (232 lb)   SpO2 95%   BMI 30.61 kg/m²     Physical Exam  Constitutional:       Appearance: Normal appearance. HENT:      Head: Normocephalic and atraumatic. Eyes:      Conjunctiva/sclera: Conjunctivae normal.   Cardiovascular:      Rate and Rhythm: Normal rate and regular rhythm. Heart sounds: Normal heart sounds. Pulmonary:      Effort: Pulmonary effort is normal.      Breath sounds: Normal breath sounds. Abdominal:      General: There is no distension. Musculoskeletal:         General: Normal range of motion. Cervical back: Normal range of motion and neck supple. Neurological:      Mental Status: He is alert and oriented to person, place, and time. Psychiatric:         Behavior: Behavior normal.         Thought Content:  Thought content normal.       Mary Jo Bolanos MD

## 2023-11-15 NOTE — LETTER
November 15, 2023     Patient: Celeste Garcia  YOB: 1965  Date of Visit: 11/15/2023      To Whom it May Concern:    Bubba Hull is under my professional care. Tressa Blum was seen in my office on 11/15/2023. He is excused from work for medical reasons from 11/13/23-11/15/23. 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 If you have any questions or concerns, please don't hesitate to call.          Sincerely,          Sylvie Melendez MD        CC: No Recipients

## 2023-11-15 NOTE — LETTER
November 15, 2023     Patient: Constanza Alejandro  YOB: 1965  Date of Visit: 11/15/2023      To Whom it May Concern:    Radha Guardado is under my professional care. Salomon Walton was seen in my office on 11/15/2023. 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 If you have any questions or concerns, please don't hesitate to call.          Sincerely,          Shannon Fox MD        CC: No Recipients

## 2023-11-15 NOTE — ASSESSMENT & PLAN NOTE
Improving. Patient seen at urgent care and received a clinical diagnosis of pneumonia. Cxr does not support diagnosis. No CBC collected. Likely more viral bronchitis. Patient finished 5 day course of azithromycin. Is not currently using albuterol. Reports residual ear discomfort and post nasal drip. Supportive therapy advised. RTW note provided.

## 2023-11-24 ENCOUNTER — NURSE TRIAGE (OUTPATIENT)
Age: 58
End: 2023-11-24

## 2023-11-24 NOTE — TELEPHONE ENCOUNTER
Spoke to patient he still have a cough up sputum yellow- brown, difficulty sleeping, wheezing, when he is sleeping on left side can feel gurgling, no chest pain, sinus headache, chest congestion.      Please review

## 2023-11-24 NOTE — TELEPHONE ENCOUNTER
Patient with diagnosis of pneumonia 11/6 at [facility]; fu OV 11/15. Patient called in to report wheezing, gurgling overnight with chest congestion and cough with yellow-brown sputum. Azithromycin started 11/6 and completed 5 day course. Patient currently in [place]. Can be at office anytime after 2:30 PM today. New medication orders can be sent to [facility] on profile. Please follow up with patient. Reason for Disposition   [1] Caller has URGENT question AND [2] triager unable to answer question    Answer Assessment - Initial Assessment Questions  1. SYMPTOM: "What's the main symptom you're concerned about?" (e.g., breathing difficulty, fever, weakness)      Chest congestion not improved; cough persists, wheezing overnight, gurgling in chest/throat; yellow-green-brown sputum  2. ONSET: "When did the  symptoms  start?"      11/2/23; urgent care on 11/6; OV 11/15  3. BETTER-SAME-WORSE: "Are you getting better, staying the same, or getting worse compared to the day you were discharged?"      Better but still not improved  4. HOSPITALIZATION: "How long were you hospitalized?" (e.g., days)      Not hopsitalized; seen at [facility]  5. DISCHARGE DATE: "What date were you discharged from the hospital?"      N/a  6. DISCHARGE DOCTOR: "Who is the main doctor taking care of you now?"      N/a  7. DISCHARGE APPOINTMENT: "Have you scheduled a follow-up discharge appointment with your doctor?"      OV on 11/15  8. DISCHARGE MEDICATIONS: "Did the physician who discharged you order any new medications for you to use? If yes, have you filled the prescription and started taking the medication?"       Azitrhromycin 5-day course  9. DISCHARGE ANTIBIOTICS: "Are you taking any antibiotic medication now?"       Completed  10.  BREATHING DIFFICULTY: "Are you having any difficulty breathing?" If Yes, ask: "How bad is it?"  (e.g., none, mild, moderate, severe)    - MILD: No SOB at rest, mild SOB with walking, speaks normally in sentences, can lay down, no retractions, pulse < 100.     - MODERATE: SOB at rest, SOB with minimal exertion and prefers to sit, cannot lie down flat, speaks in phrases, mild retractions, audible wheezing, pulse 100-120.     - SEVERE: Very SOB at rest, speaks in single words, struggling to breathe, sitting hunched forward, retractions, pulse > 120         Mild SOB; can't take deep breaths  11. FEVER: "Do you have a fever?" If Yes, ask: "What is it, how was it measured and when did it start?"        Denies  12.  OTHER SYMPTOMS: "Do you have any other symptoms?" (e.g., weakness, confusion, pain)        As described above    Protocols used: Pneumonia on Antibiotic Post-Hospitalization Follow-up Call-[facility]-

## 2023-11-24 NOTE — TELEPHONE ENCOUNTER
Regarding: Not getting better/wheezing/chest congestion/etc.  ----- Message from Schuyler Hair sent at 11/24/2023  8:17 AM EST -----  Patient called because his symptoms have gotten worse since he was seen in office last week (11/17). Bhavya Chi is experiencing chest congestion, cough, wheezing, and he is bringing up thick greenish/yellow mucus. Patient states "something just doesn't feel right"  Bhavya Chi called for an appointment for today, there was nothing available, tried to reach the office for an overbook, but there was no answer. Tried to reach [facility], no answer. I offered patient the option of a [facility] but he states they weren't much help last time. Please call Bhavya Chi at [phone].     Thank you

## 2023-11-25 ENCOUNTER — OFFICE VISIT (OUTPATIENT)
Dept: URGENT CARE | Facility: MEDICAL CENTER | Age: 58
End: 2023-11-25
Payer: COMMERCIAL

## 2023-11-25 VITALS
BODY MASS INDEX: 29.69 KG/M2 | HEIGHT: 73 IN | OXYGEN SATURATION: 96 % | TEMPERATURE: 97.9 F | WEIGHT: 224 LBS | SYSTOLIC BLOOD PRESSURE: 145 MMHG | DIASTOLIC BLOOD PRESSURE: 90 MMHG | RESPIRATION RATE: 18 BRPM | HEART RATE: 98 BPM

## 2023-11-25 DIAGNOSIS — J20.9 ACUTE BRONCHITIS, UNSPECIFIED ORGANISM: Primary | ICD-10-CM

## 2023-11-25 PROCEDURE — 99213 OFFICE O/P EST LOW 20 MIN: CPT | Performed by: PHYSICIAN ASSISTANT

## 2023-11-25 RX ORDER — PREDNISONE 10 MG/1
TABLET ORAL
Qty: 42 TABLET | Refills: 0 | Status: SHIPPED | OUTPATIENT
Start: 2023-11-25 | End: 2023-12-07

## 2023-11-25 RX ORDER — DEXTROMETHORPHAN HYDROBROMIDE AND PROMETHAZINE HYDROCHLORIDE 15; 6.25 MG/5ML; MG/5ML
5 SYRUP ORAL
Qty: 118 ML | Refills: 0 | Status: SHIPPED | OUTPATIENT
Start: 2023-11-25 | End: 2023-11-29 | Stop reason: ALTCHOICE

## 2023-11-25 NOTE — PATIENT INSTRUCTIONS
Push plenty of fluids  Start steroid today   Cough medication as prescribed at night  Start flonase daily  Discontinue Mucinex D

## 2023-11-25 NOTE — PROGRESS NOTES
[facility]        NAME: Harrison Wheeler is a 59 y.o. male  : 1965    MRN: [id number]  DATE: 2023  TIME: 1:56 PM    Assessment and Plan   Acute bronchitis, unspecified organism [J20.9]  1. Acute bronchitis, unspecified organism  predniSONE 10 mg tablet    promethazine-dextromethorphan (PHENERGAN-DM) 6.25-15 mg/5 mL oral syrup        I do not feel we need to repeat CXR this soon. CXR on  showing b/l atelectasis (in relation to the chronic coughing) Vitals are reassuring. Some coarseness throughout and wheezing in left upper lung that clears when he coughs. I feel patient would benefit from course of steroids and cough suppressant. Can continue mucinex (blue box) and Flonase. Recommend close f/u pcp    Patient Instructions     Patient Instructions   Push plenty of fluids  Start steroid today   Cough medication as prescribed at night  Start flonase daily  Discontinue Mucinex D      **Portions of the record may have been created with voice recognition software. Occasional wrong word or "sound a like" substitutions may have occurred due to the inherent limitations of voice recognition software. Read the chart carefully and recognize, using context, where substitutions have occurred. **     Chief Complaint     Chief Complaint   Patient presents with   • Cold Like Symptoms     Pt. With cough and post nasal drip for the past week and a half. He was seen here on  and diagnosed with pneumonia. He was feeling better until about 3 days ago when the cough and congestion worsened again. No fevers. History of Present Illness     Patient presents today for evaluation of cough and congestion. Patient was seen earlier this month with similar symptoms. Was treated for suspected pna with zithromax. Pt had f/u pcp for return to work and was feeling better. Patient states symptoms have since returned and progressively worsening again. Using albuterol prn. Using flonase prn.  Mucinex D he started yesterday    Cough  This is a recurrent problem. The current episode started 1 to 4 weeks ago. The problem has been gradually worsening. The problem occurs every few minutes. The cough is Productive of brown sputum. Associated symptoms include ear pain, headaches, postnasal drip, rhinorrhea, a sore throat, shortness of breath and wheezing. Pertinent negatives include no chest pain, chills, ear congestion, fever, heartburn, hemoptysis, myalgias, nasal congestion, rash, sweats or weight loss. The symptoms are aggravated by cold air and lying down. Review of Systems     Review of Systems   Constitutional:  Negative for chills, fever and weight loss. HENT:  Positive for ear pain, postnasal drip, rhinorrhea and sore throat. Respiratory:  Positive for cough, shortness of breath and wheezing. Negative for hemoptysis. Cardiovascular:  Negative for chest pain. Gastrointestinal:  Negative for heartburn. Musculoskeletal:  Negative for myalgias. Skin:  Negative for rash. Neurological:  Positive for headaches. Current Medications     Current Outpatient Medications:   •  Ascorbic Acid (Vitamin C) 500 MG CHEW, Chew 1 Dose daily, Disp: , Rfl:   •  aspirin 81 MG tablet, Take 81 mg by mouth daily. , Disp: , Rfl:   •  atorvastatin (LIPITOR) 80 mg tablet, Take 1 tablet (80 mg total) by mouth daily, Disp: 90 tablet, Rfl: 0  •  b complex vitamins capsule, Take 1 capsule by mouth daily. , Disp: , Rfl:   •  Cholecalciferol (D3-1000) 25 MCG (1000 UT) capsule, Take 2 capsules by mouth daily, Disp: , Rfl:   •  Cinnamon 500 MG capsule, , Disp: , Rfl:   •  fluticasone (FLONASE) 50 mcg/act nasal spray, 1 spray into each nostril daily, Disp: 15.8 mL, Rfl: 1  •  Loratadine 10 MG CAPS, Take 1 capsule by mouth daily, Disp: , Rfl:   •  Magnesium 500 MG TABS, Take 1 tablet by mouth daily, Disp: , Rfl:   •  Multiple Vitamin (MULTIVITAMIN) capsule, Take 1 capsule by mouth daily. , Disp: , Rfl:   •  Omega-3 Fatty Acids (fish oil) 1,000 mg, Take 1,000 mg by mouth daily, Disp: , Rfl:   •  predniSONE 10 mg tablet, Take 5 tablets (50 mg total) by mouth daily for 3 days, THEN 4 tablets (40 mg total) daily for 3 days, THEN 3 tablets (30 mg total) daily for 3 days, THEN 2 tablets (20 mg total) daily for 3 days. , Disp: 42 tablet, Rfl: 0  •  promethazine-dextromethorphan (PHENERGAN-DM) 6.25-15 mg/5 mL oral syrup, Take 5 mL by mouth daily at bedtime as needed for cough, Disp: 118 mL, Rfl: 0  •  vitamin E, tocopherol, 400 units capsule, Take 400 Units by mouth daily. , Disp: , Rfl:   •  Zinc 50 MG TABS, Take 1 tablet by mouth daily, Disp: , Rfl:   •  benzonatate (TESSALON) 200 MG capsule, Take 1 capsule (200 mg total) by mouth 3 (three) times a day as needed for cough, Disp: 20 capsule, Rfl: 0  •  tadalafil (CIALIS) 5 MG tablet, TAKE 1 TABLET DAILY AT BEDTIME, Disp: 90 tablet, Rfl: 3    Current Allergies     Allergies as of 11/25/2023   • (No Known Allergies)            The following portions of the patient's history were reviewed and updated as appropriate: allergies, current medications, past family history, past medical history, past social history, past surgical history and problem list.     Past Medical History:   Diagnosis Date   • Acute sinusitis    • Allergic rhinitis    • Chronic sinusitis     last assessed 10/23/2012   • Hyperlipemia    • Kidney stone 5/2001   • Other tear of medial meniscus, current injury, left knee, initial encounter 10/21/2022       Past Surgical History:   Procedure Laterality Date   • CHOLECYSTECTOMY     • COLONOSCOPY N/A 1/22/2016    Procedure: COLONOSCOPY;  Surgeon: James Fenton MD;  [facility];   Service:    • CYSTOSCOPY      4/13/22   • HERNIA REPAIR      Inguinal   • KIDNEY STONE SURGERY      In your records   • LITHOTRIPSY      In your records   • ORTHOPEDIC SURGERY Right 1997    Knee   • [place] ARTHRS KNE SURG W/MENISCECTOMY MED/LAT W/SHVG Left 11/16/2022    Procedure: ARTHROSCOPY KNEE with partial medial  MENISECTOMY and patellofemoral chondroplasty;  Surgeon: Gennaro Carbajal MD;  [facility];  Service: Orthopedics   • FL CYSTOURETHROSCOPY N/A 4/13/2022    Procedure: CYSTO;  Surgeon: Elijah Shaw MD;  [facility];  Service: Urology   • FL LITHOLAPAXY COMP/LG > 2.5 CM N/A 4/13/2022    Procedure: Soraya Elaine;  Surgeon: Elijah Shaw MD;  [facility];  Service: Urology   • SHOULDER ARTHROSCOPY Right        Family History   Problem Relation Age of Onset   • Nephrolithiasis Mother    • Hyperlipidemia Mother    • Hypertension Mother    • Urolithiasis Mother    • Hyperlipidemia Father    • Nephrolithiasis Brother    • Multiple sclerosis Sister    • Coronary artery disease Maternal Grandmother          Medications have been verified. Objective     /90   Pulse 98   Temp 97.9 °F (36.6 °C)   Resp 18   Ht 6' 1" (1.854 m)   Wt 102 kg (224 lb)   SpO2 96%   BMI 29.55 kg/m²        Physical Exam     Physical Exam  Vitals and nursing note reviewed. Constitutional:       Appearance: He is well-developed. HENT:      Head: Normocephalic. Right Ear: Hearing and tympanic membrane normal.      Left Ear: Hearing and tympanic membrane normal.      Nose: No mucosal edema. Mouth/Throat:      Pharynx: Uvula midline. Posterior oropharyngeal erythema present. Cardiovascular:      Rate and Rhythm: Normal rate and regular rhythm. Pulmonary:      Effort: Pulmonary effort is normal.      Breath sounds: Decreased air movement present. Examination of the left-upper field reveals wheezing. Wheezing present.

## 2023-11-27 ENCOUNTER — OFFICE VISIT (OUTPATIENT)
Dept: FAMILY MEDICINE CLINIC | Facility: CLINIC | Age: 58
End: 2023-11-27
Payer: COMMERCIAL

## 2023-11-27 ENCOUNTER — APPOINTMENT (OUTPATIENT)
Dept: RADIOLOGY | Facility: MEDICAL CENTER | Age: 58
End: 2023-11-27
Payer: COMMERCIAL

## 2023-11-27 VITALS
WEIGHT: 223.3 LBS | TEMPERATURE: 97.8 F | RESPIRATION RATE: 17 BRPM | DIASTOLIC BLOOD PRESSURE: 90 MMHG | OXYGEN SATURATION: 95 % | BODY MASS INDEX: 29.6 KG/M2 | HEIGHT: 73 IN | HEART RATE: 104 BPM | SYSTOLIC BLOOD PRESSURE: 130 MMHG

## 2023-11-27 DIAGNOSIS — J40 BRONCHITIS: ICD-10-CM

## 2023-11-27 DIAGNOSIS — J40 BRONCHITIS: Primary | ICD-10-CM

## 2023-11-27 PROCEDURE — 99213 OFFICE O/P EST LOW 20 MIN: CPT | Performed by: STUDENT IN AN ORGANIZED HEALTH CARE EDUCATION/TRAINING PROGRAM

## 2023-11-27 PROCEDURE — 71046 X-RAY EXAM CHEST 2 VIEWS: CPT

## 2023-11-27 RX ORDER — AMOXICILLIN AND CLAVULANATE POTASSIUM 875; 125 MG/1; MG/1
1 TABLET, FILM COATED ORAL EVERY 12 HOURS SCHEDULED
Qty: 10 TABLET | Refills: 0 | OUTPATIENT
Start: 2023-11-27 | End: 2023-11-29

## 2023-11-27 NOTE — LETTER
November 27, 2023     Patient: Lul Alvarez  YOB: 1965  Date of Visit: 11/27/2023      To Whom it May Concern:    Mojgan Walter is under my professional care. Tena Love was seen in my office on 11/27/2023. Tena Love may return to work on 12/4/23 . If you have any questions or concerns, please don't hesitate to call.          Sincerely,          Donte Chapa MD        CC: No Recipients

## 2023-11-27 NOTE — PROGRESS NOTES
Name: Janett Contreras      : 1965      MRN: [id number]  Encounter Provider: Tray Aldana MD  Encounter Date: 2023   Encounter department: [address] [place] [address]. Bronchitis  Assessment & Plan:  4th visit for same, now with subacute cough for 3 weeks. - Will trial Breztri inhaler (sample provided to patient) BID for 14 days, chest xray, and abx given new onset fever yesterday evening  - Return precautions provided    Orders:  -     XR chest pa & lateral; Future; Expected date: 2023  -     amoxicillin-clavulanate (AUGMENTIN) 875-125 mg per tablet; Take 1 tablet by mouth every 12 (twelve) hours for 5 days           Subjective     Cough  Associated symptoms include ear pain, a fever (tmax 100.4), a sore throat and wheezing. Sore Throat   Associated symptoms include coughing and ear pain. Wheezing  Associated symptoms include coughing, a sore throat and wheezing. Earache   Associated symptoms include coughing and a sore throat. Patient presents for continued bronchitis - symptoms include productive cough and chest congestion. Patient initally seen on 11/15, 12 days following symptoms onset. Had taken course of azithromycin prior to this visit. Sent home on flonase for ETD. Recently seen by urgent care ) and prescribed prednisone taper, currently on day 3 of 12. Xray on 23 showing bilateral lung atelectasis consistent with diagnosis of bronchitis. Patient reports fever of 100.4 last night. Has been taking Tylenol and is currently afebrile. Denies n/v/d. Reports difficulty with deep inspiration as it results in cough fits. Cough is productive of yellow tinged sputum. Review of Systems   Constitutional:  Positive for fever (tmax 100.4). HENT:  Positive for ear pain and sore throat. Respiratory:  Positive for cough and wheezing.         Past Medical History:   Diagnosis Date    Acute sinusitis     Allergic     Seasonal Allergic rhinitis     Chronic sinusitis     last assessed 10/23/2012    Hyperlipemia     Kidney stone 05/2001    Other tear of medial meniscus, current injury, left knee, initial encounter 10/21/2022     Past Surgical History:   Procedure Laterality Date    CHOLECYSTECTOMY      COLONOSCOPY N/A 01/22/2016    Procedure: COLONOSCOPY;  Surgeon: Cheryl Grossman MD;  [facility];   Service:     CYSTOSCOPY      4/13/22    HERNIA REPAIR      Inguinal    KIDNEY STONE SURGERY      In your records    [address]    Both knees scoped, unsure of dates    LITHOTRIPSY      In your records    ORTHOPEDIC SURGERY Right 1997    Knee    [place] ARTHRS KNE SURG W/MENISCECTOMY MED/LAT W/SHVG Left 11/16/2022    Procedure: ARTHROSCOPY KNEE with partial medial  MENISECTOMY and patellofemoral chondroplasty;  Surgeon: Kaylyn Castleman, MD;  [facility];  Service: Orthopedics    [place] CYSTOURETHROSCOPY N/A 04/13/2022    Procedure: Santiago Navarro;  Surgeon: Jonathan Marte MD;  [facility];  Service: Urology    [place] LITHOLAPAXY COMP/LG > 2.5 CM N/A 04/13/2022    Procedure: Rose Ingram;  Surgeon: Jonathan Marte MD;  [facility];  Service: Urology    SHOULDER ARTHROSCOPY Right      Family History   Problem Relation Age of Onset    Nephrolithiasis Mother     Hyperlipidemia Mother         Under treatment    Hypertension Mother     Urolithiasis Mother     Coronary artery disease Mother     COPD Mother         Under treatment    Hyperlipidemia Father         Under treatment    Nephrolithiasis Brother     Multiple sclerosis Sister     Coronary artery disease Maternal Grandmother      Social History     Socioeconomic History    Marital status: /Civil Union     Spouse name: None    Number of children: None    Years of education: None    Highest education level: None   Occupational History     Employer: [employer]   Tobacco Use    Smoking status: Never    Smokeless tobacco: Never   Vaping Use    Vaping Use: Never used   Substance and Sexual Activity    Alcohol use: Yes     Alcohol/week: 6.0 standard drinks of alcohol     Types: 6 Cans of beer per week     Comment: social    Drug use: Never    Sexual activity: Yes     Partners: Female     Birth control/protection: None   Other Topics Concern    None   Social History Narrative    None     Social Determinants of Health     Financial Resource Strain: Not on file   Food Insecurity: Not on file   Transportation Needs: Not on file   Physical Activity: Not on file   Stress: Not on file   Social Connections: Not on file   Intimate Partner Violence: Not on file   Housing Stability: Not on file     Current Outpatient Medications on File Prior to Visit   Medication Sig    Ascorbic Acid (Vitamin C) 500 MG CHEW Chew 1 Dose daily    aspirin 81 MG tablet Take 81 mg by mouth daily. atorvastatin (LIPITOR) 80 mg tablet Take 1 tablet (80 mg total) by mouth daily    b complex vitamins capsule Take 1 capsule by mouth daily. benzonatate (TESSALON) 200 MG capsule Take 1 capsule (200 mg total) by mouth 3 (three) times a day as needed for cough    Cholecalciferol (D3-1000) 25 MCG (1000 UT) capsule Take 2 capsules by mouth daily    Cinnamon 500 MG capsule     fluticasone (FLONASE) 50 mcg/act nasal spray 1 spray into each nostril daily    Loratadine 10 MG CAPS Take 1 capsule by mouth daily    Magnesium 500 MG TABS Take 1 tablet by mouth daily    Multiple Vitamin (MULTIVITAMIN) capsule Take 1 capsule by mouth daily. Omega-3 Fatty Acids (fish oil) 1,000 mg Take 1,000 mg by mouth daily    predniSONE 10 mg tablet Take 5 tablets (50 mg total) by mouth daily for 3 days, THEN 4 tablets (40 mg total) daily for 3 days, THEN 3 tablets (30 mg total) daily for 3 days, THEN 2 tablets (20 mg total) daily for 3 days.     promethazine-dextromethorphan (PHENERGAN-DM) 6.25-15 mg/5 mL oral syrup Take 5 mL by mouth daily at bedtime as needed for cough    tadalafil (CIALIS) 5 MG tablet TAKE 1 TABLET DAILY AT BEDTIME    vitamin E, tocopherol, 400 units capsule Take 400 Units by mouth daily. Zinc 50 MG TABS Take 1 tablet by mouth daily     No Known Allergies  Immunization History   Administered Date(s) Administered    COVID-19 PFIZER VACCINE 0.3 ML IM 03/30/2021, 04/20/2021, 01/08/2022    INFLUENZA 09/13/2018, 09/05/2021, 09/10/2022, 09/17/2023    Influenza Quadrivalent Preservative Free 3 years and older IM 09/17/2015, 09/06/2017    Influenza Quadrivalent, 6-35 Months IM 10/06/2016    Influenza, recombinant, quadrivalent,injectable, preservative free 09/13/2019, 09/08/2020    Influenza, seasonal, injectable 12/10/2012, 10/15/2013, 10/15/2013, 01/01/2014    Tdap 11/06/2014    Zoster Vaccine Recombinant 09/04/2020, 12/02/2020       Objective     /90 (BP Location: Left arm, Patient Position: Sitting, Cuff Size: Standard)   Pulse 104   Temp 97.8 °F (36.6 °C) (Tympanic)   Resp 17   Ht 6' 1" (1.854 m)   Wt 101 kg (223 lb 4.8 oz)   SpO2 95%   BMI 29.46 kg/m²     Physical Exam  Constitutional:       Appearance: Normal appearance. HENT:      Head: Normocephalic and atraumatic. Right Ear: Tympanic membrane, ear canal and external ear normal.      Left Ear: Tympanic membrane, ear canal and external ear normal.      Nose: Congestion present. Right Turbinates: Enlarged. Left Turbinates: Enlarged. Eyes:      Conjunctiva/sclera: Conjunctivae normal.   Cardiovascular:      Rate and Rhythm: Normal rate and regular rhythm. Heart sounds: Normal heart sounds. Pulmonary:      Effort: Pulmonary effort is normal.      Breath sounds: Normal breath sounds. Abdominal:      General: There is no distension. Musculoskeletal:         General: Normal range of motion. Cervical back: Normal range of motion and neck supple. Neurological:      Mental Status: He is alert and oriented to person, place, and time. Psychiatric:         Behavior: Behavior normal.         Thought Content:  Thought content normal.       Mary Jo Bolanos MD

## 2023-11-27 NOTE — ASSESSMENT & PLAN NOTE
4th visit for same, now with worsening subacute cough for 3 weeks.     - Will trial Breztri inhaler (sample provided to patient) BID for 14 days, repeat chest xray, and abx given new onset fever yesterday evening  - Return precautions provided

## 2023-11-29 ENCOUNTER — TELEPHONE (OUTPATIENT)
Age: 58
End: 2023-11-29

## 2023-11-29 ENCOUNTER — APPOINTMENT (EMERGENCY)
Dept: CT IMAGING | Facility: HOSPITAL | Age: 58
End: 2023-11-29
Payer: COMMERCIAL

## 2023-11-29 ENCOUNTER — NURSE TRIAGE (OUTPATIENT)
Age: 58
End: 2023-11-29

## 2023-11-29 ENCOUNTER — HOSPITAL ENCOUNTER (EMERGENCY)
Facility: HOSPITAL | Age: 58
Discharge: HOME | End: 2023-11-29
Attending: EMERGENCY MEDICINE
Payer: COMMERCIAL

## 2023-11-29 VITALS
HEART RATE: 85 BPM | OXYGEN SATURATION: 95 % | DIASTOLIC BLOOD PRESSURE: 86 MMHG | SYSTOLIC BLOOD PRESSURE: 138 MMHG | RESPIRATION RATE: 18 BRPM | TEMPERATURE: 98.9 F

## 2023-11-29 DIAGNOSIS — J18.9 PNEUMONIA: Primary | ICD-10-CM

## 2023-11-29 LAB
ALBUMIN SERPL BCP-MCNC: 4.4 G/DL (ref 3.5–5)
ALP SERPL-CCNC: 90 U/L (ref 34–104)
ALT SERPL W P-5'-P-CCNC: 43 U/L (ref 7–52)
ANION GAP SERPL CALCULATED.3IONS-SCNC: 10 MMOL/L
AST SERPL W P-5'-P-CCNC: 31 U/L (ref 13–39)
BASOPHILS # BLD AUTO: 0.02 THOUSANDS/ÂΜL (ref 0–0.1)
BASOPHILS NFR BLD AUTO: 0 % (ref 0–1)
BILIRUB SERPL-MCNC: 0.38 MG/DL (ref 0.2–1)
BNP SERPL-MCNC: 13 PG/ML (ref 0–100)
BUN SERPL-MCNC: 20 MG/DL (ref 5–25)
CALCIUM SERPL-MCNC: 9.3 MG/DL (ref 8.4–10.2)
CHLORIDE SERPL-SCNC: 100 MMOL/L (ref 96–108)
CO2 SERPL-SCNC: 29 MMOL/L (ref 21–32)
CREAT SERPL-MCNC: 0.83 MG/DL (ref 0.6–1.3)
CRP SERPL QL: 17.7 MG/L
EOSINOPHIL # BLD AUTO: 0.01 THOUSAND/ÂΜL (ref 0–0.61)
EOSINOPHIL NFR BLD AUTO: 0 % (ref 0–6)
ERYTHROCYTE [DISTWIDTH] IN BLOOD BY AUTOMATED COUNT: 13 % (ref 11.6–15.1)
GFR SERPL CREATININE-BSD FRML MDRD: 95 ML/MIN/1.73SQ M
GLUCOSE SERPL-MCNC: 150 MG/DL (ref 65–140)
HCT VFR BLD AUTO: 46.2 % (ref 36.5–49.3)
HGB BLD-MCNC: 15.4 G/DL (ref 12–17)
IMM GRANULOCYTES # BLD AUTO: 0.04 THOUSAND/UL (ref 0–0.2)
IMM GRANULOCYTES NFR BLD AUTO: 1 % (ref 0–2)
LACTATE SERPL-SCNC: 1.5 MMOL/L (ref 0.5–2)
LYMPHOCYTES # BLD AUTO: 0.9 THOUSANDS/ÂΜL (ref 0.6–4.47)
LYMPHOCYTES NFR BLD AUTO: 11 % (ref 14–44)
MCH RBC QN AUTO: 30 PG (ref 26.8–34.3)
MCHC RBC AUTO-ENTMCNC: 33.3 G/DL (ref 31.4–37.4)
MCV RBC AUTO: 90 FL (ref 82–98)
MONOCYTES # BLD AUTO: 0.46 THOUSAND/ÂΜL (ref 0.17–1.22)
MONOCYTES NFR BLD AUTO: 6 % (ref 4–12)
NEUTROPHILS # BLD AUTO: 6.59 THOUSANDS/ÂΜL (ref 1.85–7.62)
NEUTS SEG NFR BLD AUTO: 82 % (ref 43–75)
NRBC BLD AUTO-RTO: 0 /100 WBCS
PLATELET # BLD AUTO: 189 THOUSANDS/UL (ref 149–390)
PMV BLD AUTO: 8.8 FL (ref 8.9–12.7)
POTASSIUM SERPL-SCNC: 3.9 MMOL/L (ref 3.5–5.3)
PROCALCITONIN SERPL-MCNC: 0.06 NG/ML
PROT SERPL-MCNC: 7.6 G/DL (ref 6.4–8.4)
RBC # BLD AUTO: 5.13 MILLION/UL (ref 3.88–5.62)
SODIUM SERPL-SCNC: 139 MMOL/L (ref 135–147)
WBC # BLD AUTO: 8.02 THOUSAND/UL (ref 4.31–10.16)

## 2023-11-29 PROCEDURE — 93005 ELECTROCARDIOGRAM TRACING: CPT

## 2023-11-29 PROCEDURE — 71250 CT THORAX DX C-: CPT

## 2023-11-29 PROCEDURE — 83880 ASSAY OF NATRIURETIC PEPTIDE: CPT | Performed by: EMERGENCY MEDICINE

## 2023-11-29 PROCEDURE — 87040 BLOOD CULTURE FOR BACTERIA: CPT | Performed by: EMERGENCY MEDICINE

## 2023-11-29 PROCEDURE — 36415 COLL VENOUS BLD VENIPUNCTURE: CPT | Performed by: EMERGENCY MEDICINE

## 2023-11-29 PROCEDURE — 86140 C-REACTIVE PROTEIN: CPT | Performed by: EMERGENCY MEDICINE

## 2023-11-29 PROCEDURE — 85025 COMPLETE CBC W/AUTO DIFF WBC: CPT | Performed by: EMERGENCY MEDICINE

## 2023-11-29 PROCEDURE — 99284 EMERGENCY DEPT VISIT MOD MDM: CPT

## 2023-11-29 PROCEDURE — 80053 COMPREHEN METABOLIC PANEL: CPT | Performed by: EMERGENCY MEDICINE

## 2023-11-29 PROCEDURE — 83605 ASSAY OF LACTIC ACID: CPT | Performed by: EMERGENCY MEDICINE

## 2023-11-29 PROCEDURE — G1004 CDSM NDSC: HCPCS

## 2023-11-29 PROCEDURE — 84145 PROCALCITONIN (PCT): CPT | Performed by: EMERGENCY MEDICINE

## 2023-11-29 PROCEDURE — 99285 EMERGENCY DEPT VISIT HI MDM: CPT | Performed by: EMERGENCY MEDICINE

## 2023-11-29 RX ORDER — DOXYCYCLINE HYCLATE 100 MG/1
100 CAPSULE ORAL 2 TIMES DAILY
Qty: 10 CAPSULE | Refills: 0 | Status: SHIPPED | OUTPATIENT
Start: 2023-11-29 | End: 2023-12-05

## 2023-11-29 RX ORDER — DOXYCYCLINE HYCLATE 100 MG/1
100 CAPSULE ORAL ONCE
Status: COMPLETED | OUTPATIENT
Start: 2023-11-29 | End: 2023-11-29

## 2023-11-29 RX ORDER — HYDROCODONE BITARTRATE AND HOMATROPINE METHYLBROMIDE ORAL SOLUTION 5; 1.5 MG/5ML; MG/5ML
5 LIQUID ORAL 4 TIMES DAILY PRN
Qty: 40 ML | Refills: 0 | Status: SHIPPED | OUTPATIENT
Start: 2023-11-29 | End: 2023-12-09

## 2023-11-29 RX ORDER — CEFPODOXIME PROXETIL 200 MG/1
200 TABLET, FILM COATED ORAL ONCE
Status: COMPLETED | OUTPATIENT
Start: 2023-11-29 | End: 2023-11-29

## 2023-11-29 RX ORDER — CEFPODOXIME PROXETIL 200 MG/1
200 TABLET, FILM COATED ORAL 2 TIMES DAILY
Qty: 10 TABLET | Refills: 0 | Status: SHIPPED | OUTPATIENT
Start: 2023-11-29 | End: 2023-12-05

## 2023-11-29 RX ADMIN — CEFPODOXIME PROXETIL 200 MG: 200 TABLET, FILM COATED ORAL at 19:49

## 2023-11-29 RX ADMIN — DOXYCYCLINE 100 MG: 100 CAPSULE ORAL at 19:49

## 2023-11-29 NOTE — TELEPHONE ENCOUNTER
Pt called requesting X ray results I informed pt the results have not yet been released. Pt said he has been taking the Augmentin for 2 days and he feels terrible still.

## 2023-11-29 NOTE — TELEPHONE ENCOUNTER
Patient calls in for second time today stating he cannot breathe well, unable to take a deep breathe in  without going into a coughing spell. Patient was diagnosed with pneumonia on 11 6 2023 and has had multiple visits to  since . Continues to run a fever, continues with severe cough, weakness and difficulty breathing despite  taking prednisone, antibiotics and cough medicine , inhalers. Patent sounds SOB via phone. Recommended ER evaluation. Patient agreed and will head to [facility]. Reason for Disposition   MODERATE difficulty breathing (e.g., speaks in phrases, SOB even at rest, pulse 100-120) of new-onset or worse than normal    Answer Assessment - Initial Assessment Questions  1. RESPIRATORY STATUS: "Describe your breathing?" (e.g., wheezing, shortness of breath, unable to speak, severe coughing)         Can't take breathe in       2. ONSET: "When did this breathing problem begin?"             Unsure    3. PATTERN "Does the difficult breathing come and go, or has it been constant since it started?"           Constant      4. SEVERITY: "How bad is your breathing?" (e.g., mild, moderate, severe)     - MILD: No SOB at rest, mild SOB with walking, speaks normally in sentences, can lay down, no retractions, pulse < 100.     - MODERATE: SOB at rest, SOB with minimal exertion and prefers to sit, cannot lie down flat, speaks in phrases, mild retractions, audible wheezing, pulse 100-120.     - SEVERE: Very SOB at rest, speaks in single words, struggling to breathe, sitting hunched forward, retractions, pulse > 120           Moderate      5. RECURRENT SYMPTOM: "Have you had difficulty breathing before?" If Yes, ask: "When was the last time?" and "What happened that time?"           No      6. CARDIAC HISTORY: "Do you have any history of heart disease?" (e.g., heart attack, angina, bypass surgery, angioplasty)           Denies      7.  LUNG HISTORY: "Do you have any history of lung disease?"  (e.g., pulmonary embolus, asthma, emphysema)          Denies    8. CAUSE: "What do you think is causing the breathing problem?"           Unsure    9.  OTHER SYMPTOMS: "Do you have any other symptoms? (e.g., dizziness, runny nose, cough, chest pain, fever)       Severe cough chest tightness    Protocols used: Breathing Difficulty-ADULT-[place]

## 2023-11-29 NOTE — ED PROVIDER NOTES
History  Chief Complaint   Patient presents with    Medical Problem     Pt states he has been sick for about one month with chest congestion, cough, and low grade fevers. Was seen at urgent care and PCP multiple times; was diagnosed with pneumonia and bronchitis. Currently taking prednisone and antibiotics. 60 yo M presenting to the ED for persistent cough, productive of thick yellow mucus, intermittent fevers and chills. Symptoms initially started about 1 month ago, in early November. He's been on albuterol, prednisone, and now Augmentin for the past 2 days but feels like he isn't improving. He's now had 4 urgent care visits. History provided by:  Patient   used: No    Medical Problem  Associated symptoms: cough, fatigue, fever and shortness of breath        Prior to Admission Medications   Prescriptions Last Dose Informant Patient Reported? Taking? Ascorbic Acid (Vitamin C) 500 MG CHEW  Self Yes No   Sig: Chew 1 Dose daily   Cholecalciferol (D3-1000) 25 MCG (1000 UT) capsule  Self Yes No   Sig: Take 2 capsules by mouth daily   Cinnamon 500 MG capsule   Yes No   Loratadine 10 MG CAPS  Self Yes No   Sig: Take 1 capsule by mouth daily   Magnesium 500 MG TABS  Self Yes No   Sig: Take 1 tablet by mouth daily   Multiple Vitamin (MULTIVITAMIN) capsule  Self Yes No   Sig: Take 1 capsule by mouth daily. Omega-3 Fatty Acids (fish oil) 1,000 mg  Self Yes No   Sig: Take 1,000 mg by mouth daily   Zinc 50 MG TABS  Self Yes No   Sig: Take 1 tablet by mouth daily   amoxicillin-clavulanate (AUGMENTIN) 875-125 mg per tablet   No No   Sig: Take 1 tablet by mouth every 12 (twelve) hours for 5 days   aspirin 81 MG tablet  Self Yes No   Sig: Take 81 mg by mouth daily. atorvastatin (LIPITOR) 80 mg tablet   No No   Sig: Take 1 tablet (80 mg total) by mouth daily   b complex vitamins capsule  Self Yes No   Sig: Take 1 capsule by mouth daily.    benzonatate (TESSALON) 200 MG capsule   No No   Sig: Take 1 capsule (200 mg total) by mouth 3 (three) times a day as needed for cough   fluticasone (FLONASE) 50 mcg/act nasal spray   No No   Si spray into each nostril daily   predniSONE 10 mg tablet   No No   Sig: Take 5 tablets (50 mg total) by mouth daily for 3 days, THEN 4 tablets (40 mg total) daily for 3 days, THEN 3 tablets (30 mg total) daily for 3 days, THEN 2 tablets (20 mg total) daily for 3 days. tadalafil (CIALIS) 5 MG tablet   No No   Sig: TAKE 1 TABLET DAILY AT BEDTIME   vitamin E, tocopherol, 400 units capsule  Self Yes No   Sig: Take 400 Units by mouth daily. Facility-Administered Medications: None       Past Medical History:   Diagnosis Date    Acute sinusitis     Allergic 1999    Seasonal    Allergic rhinitis     Chronic sinusitis     last assessed 10/23/2012    Hyperlipemia     Kidney stone 2001    Other tear of medial meniscus, current injury, left knee, initial encounter 10/21/2022       Past Surgical History:   Procedure Laterality Date    CHOLECYSTECTOMY      COLONOSCOPY N/A 2016    Procedure: COLONOSCOPY;  Surgeon: Sandeep Bonds MD;  Location: [facility];   Service:     CYSTOSCOPY      22    HERNIA REPAIR      Inguinal    KIDNEY STONE SURGERY      In your records    [address]    Both knees scoped, unsure of dates    LITHOTRIPSY      In your records    ORTHOPEDIC SURGERY Right     Knee    WA ARTHRS KNE SURG W/MENISCECTOMY MED/LAT W/SHVG Left 2022    Procedure: ARTHROSCOPY KNEE with partial medial  MENISECTOMY and patellofemoral chondroplasty;  Surgeon: Imelda Hardwick MD;  Location: [facility];  Service: Orthopedics    WA CYSTOURETHROSCOPY N/A 2022    Procedure: Naomi Ward;  Surgeon: Mojgan Ybarra MD;  [facility];  Service: Urology    WA LITHOLAPAXY COMP/LG > 2.5 CM N/A 2022    Procedure: Mark Norton;  Surgeon: Mojgan Ybarra MD;  [facility];  Service: Urology    SHOULDER ARTHROSCOPY Right        Family History   Problem Relation Age of Onset    Nephrolithiasis Mother     Hyperlipidemia Mother         Under treatment    Hypertension Mother     Urolithiasis Mother     Coronary artery disease Mother     COPD Mother         Under treatment    Hyperlipidemia Father         Under treatment    Nephrolithiasis Brother     Multiple sclerosis Sister     Coronary artery disease Maternal Grandmother      I have reviewed and agree with the history as documented. E-Cigarette/Vaping    E-Cigarette Use Never User      E-Cigarette/Vaping Substances    Nicotine No     THC No     CBD No     Flavoring No     Other No     Unknown No      Social History     Tobacco Use    Smoking status: Never    Smokeless tobacco: Never   Vaping Use    Vaping Use: Never used   Substance Use Topics    Alcohol use: Yes     Alcohol/week: 6.0 standard drinks of alcohol     Types: 6 Cans of beer per week     Comment: social    Drug use: Never       Review of Systems   Constitutional:  Positive for chills, fatigue and fever. Respiratory:  Positive for cough and shortness of breath. All other systems reviewed and are negative. Physical Exam  Physical Exam  Vitals and nursing note reviewed. Constitutional:       General: He is not in acute distress. Appearance: Normal appearance. He is normal weight. He is not ill-appearing, toxic-appearing or diaphoretic. HENT:      Head: Normocephalic and atraumatic. Right Ear: External ear normal.      Left Ear: External ear normal.      Nose: Nose normal. No congestion or rhinorrhea. Mouth/Throat:      Mouth: Mucous membranes are moist.      Pharynx: Oropharynx is clear. No oropharyngeal exudate or posterior oropharyngeal erythema. Eyes:      General: No scleral icterus. Right eye: No discharge. Left eye: No discharge. Conjunctiva/sclera: Conjunctivae normal.   Cardiovascular:      Rate and Rhythm: Normal rate and regular rhythm. Pulses: Normal pulses.       Heart sounds: Normal heart sounds. Pulmonary:      Effort: Pulmonary effort is normal. No respiratory distress. Breath sounds: Examination of the right-lower field reveals rhonchi. Examination of the left-lower field reveals rhonchi. Rhonchi present. No wheezing. Abdominal:      General: Abdomen is flat. Palpations: Abdomen is soft. Tenderness: There is no abdominal tenderness. Musculoskeletal:         General: No swelling. Normal range of motion. Cervical back: Normal range of motion and neck supple. Skin:     General: Skin is warm and dry. Capillary Refill: Capillary refill takes less than 2 seconds. Neurological:      General: No focal deficit present. Mental Status: He is alert and oriented to person, place, and time. Mental status is at baseline.    Psychiatric:         Mood and Affect: Mood normal.         Behavior: Behavior normal.         Vital Signs  ED Triage Vitals [11/29/23 1641]   Temperature Pulse Respirations Blood Pressure SpO2   98.9 °F (37.2 °C) 94 18 (!) 172/101 96 %      Temp Source Heart Rate Source Patient Position - Orthostatic VS BP Location FiO2 (%)   Oral Monitor -- -- --      Pain Score       --           Vitals:    11/29/23 1641 11/29/23 1915   BP: (!) 172/101 138/86   Pulse: 94 85         Visual Acuity      ED Medications  Medications   doxycycline hyclate (VIBRAMYCIN) capsule 100 mg (100 mg Oral Given 11/29/23 1949)   cefpodoxime (VANTIN) tablet 200 mg (200 mg Oral Given 11/29/23 1949)       Diagnostic Studies  Results Reviewed       Procedure Component Value Units Date/Time    Procalcitonin [[id number]]  (Normal) Collected: 11/29/23 1726    Lab Status: Final result Specimen: Blood from Arm, Left Updated: 11/29/23 1811     Procalcitonin 0.06 ng/ml     B-Type Natriuretic Peptide(BNP) [[id number]]  (Normal) Collected: 11/29/23 1726    Lab Status: Final result Specimen: Blood from Arm, Left Updated: 11/29/23 1807     BNP 13 pg/mL     Lactic acid, plasma (w/reflex if result > 2.0) [[id number]]  (Normal) Collected: 11/29/23 1726    Lab Status: Final result Specimen: Blood from Arm, Left Updated: 11/29/23 1807     LACTIC ACID 1.5 mmol/L     Narrative:      Result may be elevated if tourniquet was used during collection.     Comprehensive metabolic panel [[id number]]  (Abnormal) Collected: 11/29/23 1726    Lab Status: Final result Specimen: Blood from Arm, Left Updated: 11/29/23 1806     Sodium 139 mmol/L      Potassium 3.9 mmol/L      Chloride 100 mmol/L      CO2 29 mmol/L      ANION GAP 10 mmol/L      BUN 20 mg/dL      Creatinine 0.83 mg/dL      Glucose 150 mg/dL      Calcium 9.3 mg/dL      AST 31 U/L      ALT 43 U/L      Alkaline Phosphatase 90 U/L      Total Protein 7.6 g/dL      Albumin 4.4 g/dL      Total Bilirubin 0.38 mg/dL      eGFR 95 ml/min/1.73sq m     Narrative:      [place] guidelines for Chronic Kidney Disease (CKD):     Stage 1 with normal or high GFR (GFR > 90 mL/min/1.73 square meters)    Stage 2 Mild CKD (GFR = 60-89 mL/min/1.73 square meters)    Stage 3A Moderate CKD (GFR = 45-59 mL/min/1.73 square meters)    Stage 3B Moderate CKD (GFR = 30-44 mL/min/1.73 square meters)    Stage 4 Severe CKD (GFR = 15-29 mL/min/1.73 square meters)    Stage 5 End Stage CKD (GFR <15 mL/min/1.73 square meters)  Note: GFR calculation is accurate only with a steady state creatinine    C-reactive protein [[id number]]  (Abnormal) Collected: 11/29/23 1726    Lab Status: Final result Specimen: Blood from Arm, Left Updated: 11/29/23 1806     CRP 17.7 mg/L     CBC and differential [[id number]]  (Abnormal) Collected: 11/29/23 1726    Lab Status: Final result Specimen: Blood from Arm, Left Updated: 11/29/23 1742     WBC 8.02 Thousand/uL      RBC 5.13 Million/uL      Hemoglobin 15.4 g/dL      Hematocrit 46.2 %      MCV 90 fL      MCH 30.0 pg      MCHC 33.3 g/dL      RDW 13.0 %      MPV 8.8 fL      Platelets 611 Thousands/uL      nRBC 0 /100 WBCs Neutrophils Relative 82 %      Immat GRANS % 1 %      Lymphocytes Relative 11 %      Monocytes Relative 6 %      Eosinophils Relative 0 %      Basophils Relative 0 %      Neutrophils Absolute 6.59 Thousands/µL      Immature Grans Absolute 0.04 Thousand/uL      Lymphocytes Absolute 0.90 Thousands/µL      Monocytes Absolute 0.46 Thousand/µL      Eosinophils Absolute 0.01 Thousand/µL      Basophils Absolute 0.02 Thousands/µL     Blood culture #2 [[id number]] Collected: 11/29/23 1731    Lab Status: In process Specimen: Blood from Arm, Right Updated: 11/29/23 1740    Blood culture #1 [[id number]] Collected: 11/29/23 1726    Lab Status: In process Specimen: Blood from Arm, Left Updated: 11/29/23 1740                   CT chest without contrast   Final Result by Amrita Howard MD (11/29 1853)      Focal dense consolidation in the posterior right lower lobe concerning for pneumonia. Recommend imaging follow-up after clinical course to ensure resolution. The study was marked in EPIC for significant notification. Workstation performed: [id number]                    Procedures  Procedures         ED Course                                             Medical Decision Making  78-year-old male with symptoms of pneumonia, with intermittent fevers and chills, night sweats, productive cough. He has been on Augmentin for the past 2 days, however given he is still symptomatic we will change to cefpodoxime and Vantin. He is curb 63 and drip scores are low, he is a candidate for outpatient treatment as he is well-appearing, not hypoxic with normal labs. Told pt to d/c prednisone and promethazine-DM, can take hycodan PRN cough at bedtime. F/u pulmonology if symptoms persisting. Amount and/or Complexity of Data Reviewed  Labs: ordered. Decision-making details documented in ED Course. Radiology: ordered. Decision-making details documented in ED Course. Risk  Prescription drug management. Disposition  Final diagnoses:   Pneumonia     Time reflects when diagnosis was documented in both MDM as applicable and the Disposition within this note       Time User Action Codes Description Comment    11/29/2023  7:13 PM Orysia Part Add [J18.9] Pneumonia           ED Disposition       ED Disposition   Discharge    Condition   Stable    Date/Time   Wed Nov 29, 2023  7:13 PM    [address] discharge to home/self care. Follow-up Information       Follow up With Specialties Details Why Contact Info Additional Information    Judy Kebede MD Family Medicine   [id number] [place] [address] [id number] [place] [address] Pulmonology   [address] [place] [id number] [phone] [facility] [address], [facility]            Discharge Medication List as of 11/29/2023  7:28 PM        START taking these medications    Details   cefpodoxime (VANTIN) 200 mg tablet Take 1 tablet (200 mg total) by mouth 2 (two) times a day for 5 days, Starting Wed 11/29/2023, Until Mon 12/4/2023, Normal      doxycycline hyclate (VIBRAMYCIN) 100 mg capsule Take 1 capsule (100 mg total) by mouth 2 (two) times a day for 5 days, Starting Wed 11/29/2023, Until Mon 12/4/2023, Normal      HYDROcodone Bit-Homatrop MBr (HYCODAN) 5-1.5 mg/5 mL syrup Take 5 mL by mouth 4 (four) times a day as needed for cough for up to 10 days Max Daily Amount: 20 mL, Starting Wed 11/29/2023, Until Sat 12/9/2023 at 2359, Normal           CONTINUE these medications which have NOT CHANGED    Details   Ascorbic Acid (Vitamin C) 500 MG CHEW Chew 1 Dose daily, Starting Wed 7/1/2020, Historical Med      aspirin 81 MG tablet Take 81 mg by mouth daily. , Historical Med      atorvastatin (LIPITOR) 80 mg tablet Take 1 tablet (80 mg total) by mouth daily, Starting Mon 10/23/2023, Normal      b complex vitamins capsule Take 1 capsule by mouth daily. , Historical Med      Cholecalciferol (D3-1000) 25 MCG (1000 UT) capsule Take 2 capsules by mouth daily, Starting Wed 4/1/2020, Historical Med      Cinnamon 500 MG capsule Historical Med      fluticasone (FLONASE) 50 mcg/act nasal spray 1 spray into each nostril daily, Starting Wed 11/15/2023, Normal      Loratadine 10 MG CAPS Take 1 capsule by mouth daily, Starting Sat 3/31/2018, Historical Med      Magnesium 500 MG TABS Take 1 tablet by mouth daily, Starting Wed 7/1/2020, Historical Med      Multiple Vitamin (MULTIVITAMIN) capsule Take 1 capsule by mouth daily. , Historical Med      Omega-3 Fatty Acids (fish oil) 1,000 mg Take 1,000 mg by mouth daily, Historical Med      predniSONE 10 mg tablet Multiple Dosages:Starting Sat 11/25/2023, Until Mon 11/27/2023 at 2359, THEN Starting Tue 11/28/2023, Until Thu 11/30/2023 at 2359, THEN Starting Fri 12/1/2023, Until Sun 12/3/2023 at 2359, THEN Starting Mon 12/4/2023, Until Wed 12/6/2023 at 2359Tak e 5 tablets (50 mg total) by mouth daily for 3 days, THEN 4 tablets (40 mg total) daily for 3 days, THEN 3 tablets (30 mg total) daily for 3 days, THEN 2 tablets (20 mg total) daily for 3 days. , Normal      tadalafil (CIALIS) 5 MG tablet TAKE 1 TABLET DAILY AT BEDTIME, Starting Mon 10/23/2023, Normal      vitamin E, tocopherol, 400 units capsule Take 400 Units by mouth daily. , Historical Med      Zinc 50 MG TABS Take 1 tablet by mouth daily, Starting Fri 5/1/2020, Historical Med           STOP taking these medications       amoxicillin-clavulanate (AUGMENTIN) 875-125 mg per tablet Comments:   Reason for Stopping:         benzonatate (TESSALON) 200 MG capsule Comments:   Reason for Stopping:                   PDMP Review         Value Time User    PDMP Reviewed  Yes 11/29/2023  7:22 PM Naila Sprague DO            ED Provider  Electronically Signed by             Naila Sprague DO  11/29/23 4498

## 2023-12-03 LAB
ATRIAL RATE: 84 BPM
BACTERIA BLD CULT: NORMAL
BACTERIA BLD CULT: NORMAL
P AXIS: 63 DEGREES
PR INTERVAL: 160 MS
QRS AXIS: 88 DEGREES
QRSD INTERVAL: 102 MS
QT INTERVAL: 350 MS
QTC INTERVAL: 413 MS
T WAVE AXIS: 40 DEGREES
VENTRICULAR RATE: 84 BPM

## 2023-12-04 LAB
BACTERIA BLD CULT: NORMAL
BACTERIA BLD CULT: NORMAL

## 2023-12-05 ENCOUNTER — OFFICE VISIT (OUTPATIENT)
Dept: FAMILY MEDICINE CLINIC | Facility: CLINIC | Age: 58
End: 2023-12-05
Payer: COMMERCIAL

## 2023-12-05 VITALS
HEIGHT: 73 IN | RESPIRATION RATE: 16 BRPM | BODY MASS INDEX: 29.95 KG/M2 | TEMPERATURE: 96.7 F | OXYGEN SATURATION: 96 % | HEART RATE: 102 BPM | DIASTOLIC BLOOD PRESSURE: 80 MMHG | WEIGHT: 226 LBS | SYSTOLIC BLOOD PRESSURE: 126 MMHG

## 2023-12-05 DIAGNOSIS — J18.9 PNEUMONIA OF RIGHT LOWER LOBE DUE TO INFECTIOUS ORGANISM: Primary | ICD-10-CM

## 2023-12-05 PROCEDURE — 99213 OFFICE O/P EST LOW 20 MIN: CPT | Performed by: FAMILY MEDICINE

## 2023-12-05 NOTE — PROGRESS NOTES
Name: Anel Wills      : 1965      MRN: [id number]  Encounter Provider: Remedios Manning MD  Encounter Date: 2023   Encounter department: [address] [place] [address]     1. Pneumonia of right lower lobe due to infectious organism      ER follow-up visit completed today  Reviewed CT scan and lab work from [facility] visit. Patient completed antibiotics. He can continue taking Robitussin as needed at home. Vital signs are currently within normal limits. Lungs are clear on physical examination. He will remain out of work until Monday, 2023. Follow-up in office as needed. Subjective      Patient presents today for ER follow-up. He was seen in the emergency department on  for cough. In the ER his blood pressure was elevated along with a CRP. CT chest was obtained showing focal dense consolidation in posterior right lower lobe concerning for pneumonia. Recommended imaging follow-up after clinical course to ensure resolution per radiology. Antibiotics were changed from Augmentin to 5 days of cefpodoxime and Doxy. Cough  This is a recurrent problem. The current episode started more than 1 month ago. The problem has been waxing and waning. The problem occurs every few minutes. The cough is Productive of sputum and productive of brown sputum. Associated symptoms include chest pain, ear congestion, nasal congestion, postnasal drip, shortness of breath and wheezing. The symptoms are aggravated by cold air, exercise and lying down. Review of Systems   HENT:  Positive for postnasal drip. Respiratory:  Positive for cough, shortness of breath and wheezing. Cardiovascular:  Positive for chest pain. Current Outpatient Medications on File Prior to Visit   Medication Sig   • Ascorbic Acid (Vitamin C) 500 MG CHEW Chew 1 Dose daily   • aspirin 81 MG tablet Take 81 mg by mouth daily.    • atorvastatin (LIPITOR) 80 mg tablet Take 1 tablet (80 mg total) by mouth daily   • b complex vitamins capsule Take 1 capsule by mouth daily. • cefpodoxime (VANTIN) 200 mg tablet Take 1 tablet (200 mg total) by mouth 2 (two) times a day for 5 days   • Cholecalciferol (D3-1000) 25 MCG (1000 UT) capsule Take 2 capsules by mouth daily   • Cinnamon 500 MG capsule    • doxycycline hyclate (VIBRAMYCIN) 100 mg capsule Take 1 capsule (100 mg total) by mouth 2 (two) times a day for 5 days   • fluticasone (FLONASE) 50 mcg/act nasal spray 1 spray into each nostril daily   • HYDROcodone Bit-Homatrop MBr (HYCODAN) 5-1.5 mg/5 mL syrup Take 5 mL by mouth 4 (four) times a day as needed for cough for up to 10 days Max Daily Amount: 20 mL   • Loratadine 10 MG CAPS Take 1 capsule by mouth daily   • Magnesium 500 MG TABS Take 1 tablet by mouth daily   • Multiple Vitamin (MULTIVITAMIN) capsule Take 1 capsule by mouth daily. • Omega-3 Fatty Acids (fish oil) 1,000 mg Take 1,000 mg by mouth daily   • tadalafil (CIALIS) 5 MG tablet TAKE 1 TABLET DAILY AT BEDTIME   • vitamin E, tocopherol, 400 units capsule Take 400 Units by mouth daily. • Zinc 50 MG TABS Take 1 tablet by mouth daily   • [DISCONTINUED] predniSONE 10 mg tablet Take 5 tablets (50 mg total) by mouth daily for 3 days, THEN 4 tablets (40 mg total) daily for 3 days, THEN 3 tablets (30 mg total) daily for 3 days, THEN 2 tablets (20 mg total) daily for 3 days. Objective     /80 (BP Location: Left arm, Patient Position: Sitting, Cuff Size: Large)   Pulse 102   Temp (!) 96.7 °F (35.9 °C)   Resp 16   Ht 6' 1" (1.854 m)   Wt 103 kg (226 lb)   SpO2 96%   BMI 29.82 kg/m²     Physical Exam  Vitals and nursing note reviewed. Constitutional:       Appearance: He is well-developed. HENT:      Head: Normocephalic and atraumatic. Cardiovascular:      Rate and Rhythm: Normal rate and regular rhythm. Pulmonary:      Effort: Pulmonary effort is normal.      Breath sounds: Normal breath sounds.    Neurological:      General: No focal deficit present. Mental Status: He is alert.    Psychiatric:         Mood and Affect: Mood normal.         Behavior: Behavior normal.       Juan Baldwin MD

## 2023-12-05 NOTE — LETTER
December 5, 2023     Patient: Christa Birmingham  YOB: 1965  Date of Visit: 12/5/2023      To Whom it May Concern:    Dulce Roe is under my professional care. Jillian Cantu was seen in my office on 12/5/2023. Jillian Cantu may return to work on 12/11/2023 . If you have any questions or concerns, please don't hesitate to call.          Sincerely,          Sepideh Hook MD        CC: No Recipients

## 2023-12-12 ENCOUNTER — TELEPHONE (OUTPATIENT)
Age: 58
End: 2023-12-12

## 2023-12-12 NOTE — TELEPHONE ENCOUNTER
Pt. Called in stated he recently went to ER once and Twice to [facility], for Congestion and Sinus issues. He has taken Sinex and it hasn't helped much. Pt. Is still unable to breath through his nose and would like to know if Dr. Ocampo can prescribe him medication. Please advise

## 2023-12-13 NOTE — TELEPHONE ENCOUNTER
Patient called again, still sick, has been battling this pneumonia for weeks.  Called in yesterday, got no response.  I tried to contact clinical and clerical and N/A.  Patient with sinus and congestion issues.  Please see past notes and ask MD to advise.  Call patient at [phone].  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

## 2024-01-13 DIAGNOSIS — E78.01 FAMILIAL HYPERCHOLESTEROLEMIA: ICD-10-CM

## 2024-01-13 DIAGNOSIS — R35.1 BENIGN PROSTATIC HYPERPLASIA WITH NOCTURIA: ICD-10-CM

## 2024-01-13 DIAGNOSIS — N40.1 BENIGN PROSTATIC HYPERPLASIA WITH NOCTURIA: ICD-10-CM

## 2024-01-15 RX ORDER — TADALAFIL 5 MG/1
5 TABLET ORAL
Qty: 90 TABLET | Refills: 0 | Status: SHIPPED | OUTPATIENT
Start: 2024-01-15

## 2024-01-15 RX ORDER — ATORVASTATIN CALCIUM 80 MG/1
80 TABLET, FILM COATED ORAL DAILY
Qty: 90 TABLET | Refills: 0 | Status: SHIPPED | OUTPATIENT
Start: 2024-01-15

## 2024-01-18 NOTE — TELEPHONE ENCOUNTER
Patient called to report 15% improvement and pain level 5/6 out of 10    He was in [place] last week   [facility] MORP W CONTRAST     For [facility] patients only: With or without FFRCT as clinically indicated.     Standing Status:   Future     Standing Expiration Date:   1/23/2025     Order Specific Question:   Additional Contrast?     Answer:   None     Order Specific Question:   STAT Creatinine as needed:     Answer:   Yes     Order Specific Question:   Reason for exam:     Answer:   Chest pain    Cardiac event monitor     7 days     Standing Status:   Future     Standing Expiration Date:   1/23/2025    EKG 12 lead     Order Specific Question:   Reason for Exam?     Answer:   Chest pain       Return in about 6 weeks (around 3/5/2024).    --Hood Zapata MD on 1/23/2024 at 2:00 PM    An electronic signature was used to authenticate this note.    Scribe's Attestation: This note was scribed in the presence of Dr. Hood Zapata MD by Marina Flores RN

## 2024-01-23 ENCOUNTER — TELEPHONE (OUTPATIENT)
Dept: PULMONOLOGY | Facility: CLINIC | Age: 59
End: 2024-01-23

## 2024-02-05 ENCOUNTER — TELEPHONE (OUTPATIENT)
Age: 59
End: 2024-02-05

## 2024-02-05 NOTE — TELEPHONE ENCOUNTER
Patient is calling in regarding his script for tadalafil (CIALIS) 5 MG tablet     States that it is being denied because the dx is showing it is for ED and not for BPH    Please correct this for him and call [facility]  Script to re submit     tadalafil (CIALIS) 5 MG tablet

## 2024-02-06 ENCOUNTER — RA CDI HCC (OUTPATIENT)
Dept: OTHER | Facility: HOSPITAL | Age: 59
End: 2024-02-06

## 2024-02-06 DIAGNOSIS — N40.1 BENIGN PROSTATIC HYPERPLASIA WITH NOCTURIA: ICD-10-CM

## 2024-02-06 DIAGNOSIS — R35.1 BENIGN PROSTATIC HYPERPLASIA WITH NOCTURIA: ICD-10-CM

## 2024-02-06 RX ORDER — TADALAFIL 5 MG/1
5 TABLET ORAL
Qty: 90 TABLET | Refills: 3 | Status: SHIPPED | OUTPATIENT
Start: 2024-02-06

## 2024-02-07 ENCOUNTER — OFFICE VISIT (OUTPATIENT)
Dept: FAMILY MEDICINE CLINIC | Facility: CLINIC | Age: 59
End: 2024-02-07
Payer: COMMERCIAL

## 2024-02-07 VITALS
SYSTOLIC BLOOD PRESSURE: 132 MMHG | HEIGHT: 73 IN | HEART RATE: 83 BPM | RESPIRATION RATE: 16 BRPM | TEMPERATURE: 98 F | DIASTOLIC BLOOD PRESSURE: 78 MMHG | BODY MASS INDEX: 30.81 KG/M2 | WEIGHT: 232.5 LBS | OXYGEN SATURATION: 97 %

## 2024-02-07 DIAGNOSIS — R73.01 IFG (IMPAIRED FASTING GLUCOSE): ICD-10-CM

## 2024-02-07 DIAGNOSIS — Z12.5 SCREENING FOR PROSTATE CANCER: ICD-10-CM

## 2024-02-07 DIAGNOSIS — E78.2 MIXED HYPERLIPIDEMIA: ICD-10-CM

## 2024-02-07 DIAGNOSIS — R05.8 RECURRENT COUGH: Primary | ICD-10-CM

## 2024-02-07 PROCEDURE — 99213 OFFICE O/P EST LOW 20 MIN: CPT | Performed by: FAMILY MEDICINE

## 2024-02-07 RX ORDER — AMOXICILLIN AND CLAVULANATE POTASSIUM 875; 125 MG/1; MG/1
1 TABLET, FILM COATED ORAL
Qty: 14 TABLET | Refills: 0 | Status: SHIPPED | OUTPATIENT
Start: 2024-02-07 | End: 2024-02-14

## 2024-02-07 NOTE — PROGRESS NOTES
Name: Slick Farah      : 1965      MRN: [id number]  Encounter Provider: Stanislaw Ocampo MD  Encounter Date: 2024   Encounter department: [facility]    Assessment & Plan     1. Recurrent cough  -     amoxicillin-clavulanate (AUGMENTIN) 875-125 mg per tablet; Take 1 tablet by mouth 2 (two) times daily after meals for 7 days  -     XR chest pa & lateral; Future; Expected date: 2024    2. Mixed hyperlipidemia  -     Comprehensive metabolic panel  -     Lipid panel    3. IFG (impaired fasting glucose)  -     Hemoglobin A1C    4. Screening for prostate cancer  -     PSA, Total Screen; Future    Continue with symptom treatment for cough and nasal congestion.  Prescription provided for antibiotic if symptoms worsen- for now defer filling medication.  Prescription for repeat chest x-ray-advised to call if any changes       Subjective     7-day history nasal congestion, postnasal drainage and sore throat now with nonproductive cough. No fevers.  Symptoms have been gradually improving. 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  He has been using OTC Delsym, DayQuil Sinex nasal spray.  He was treated 2023 for pneumonia seen on both chest x-ray and CT scan.    Cough  This is a new problem. The current episode started in the past 7 days. The problem has been gradually worsening. The problem occurs hourly. The cough is Productive of sputum. Associated symptoms include ear congestion, ear pain, headaches, nasal congestion, postnasal drip, rhinorrhea and a sore throat. Pertinent negatives include no chest pain, chills, fever, heartburn, hemoptysis, myalgias, rash, shortness of breath, sweats, weight loss or wheezing. The symptoms are aggravated by lying down.     Procedure: XR chest pa & lateral    Result Date: 2023  Narrative: CHEST INDICATION:   J40: Bronchitis, not specified as acute or chronic. COMPARISON: 2023 EXAM PERFORMED/VIEWS:  XR CHEST PA & LATERAL  Images: 2 FINDINGS: Cardiomediastinal silhouette appears unremarkable. Opacity at the right base noted previously has nearly resolved. There is persistent bandlike opacity in the left lung base, partly obscuring the left hemidiaphragm. No evidence for effusion or pneumothorax. Osseous structures appear within normal limits for patient age.     Impression: Nearly resolved right lower lobe opacity. Persistent left lower lobe opacity likely due to atelectasis or pneumonia. No new abnormalities. Workstation performed: [id number]     Procedure: CT chest without contrast    Result Date: 11/29/2023  Narrative: CT CHEST WITHOUT IV CONTRAST INDICATION:   Cough, persistent Dyspnea, chronic, unclear etiology cough, productive yellow mucus, sweats, fevers x 1 month. COMPARISON: CT coronary 6/21/2020. TECHNIQUE: CT examination of the chest was performed without intravenous contrast.  Axial, sagittal, and coronal 2D reformatted images were created from the source data and submitted for interpretation. Radiation dose length product (DLP) for this visit:  463 mGy-cm .  This examination, like all CT scans performed in the [facility], was performed utilizing techniques to minimize radiation dose exposure, including the use of iterative reconstruction and automated exposure control. FINDINGS: LARGE AIRWAYS: Large airways are clear with no tracheal or endobronchial lesion. LUNGS: Bilateral lower lobe linear atelectasis/scarring and hypoventilatory changes. There is focal dense consolidation, some of which with air bronchograms, in the posteromedial right lower lobe concerning for pneumonia. PLEURA: No pleural effusion or pneumothorax. HEART: Normal cardiac size and morphology. Minimal coronary artery calcification. No pericardial effusion or thickening. VESSELS: Normal caliber thoracic aorta with no detectable atherosclerotic plaque. Normal caliber main pulmonary artery. MEDIASTINUM AND KEYUR: Within normal limits. No  lymphadenopathy. CHEST WALL AND LOWER NECK:   Within normal limits. VISUALIZED STRUCTURES IN THE UPPER ABDOMEN: No suspicious findings. Small cyst in the right hepatic lobe. Cholecystectomy. BONES: Mild multilevel degenerative changes in the spine. Vertebral body height is maintained. No acute fracture or destructive osseous lesion.     Impression: Focal dense consolidation in the posterior right lower lobe concerning for pneumonia. Recommend imaging follow-up after clinical course to ensure resolution. The study was marked in EPIC for significant notification. Workstation performed: [id number]         Review of Systems   Constitutional:  Negative for chills, fever and weight loss.   HENT:  Positive for ear pain, postnasal drip, rhinorrhea and sore throat.    Respiratory:  Positive for cough. Negative for hemoptysis, shortness of breath and wheezing.    Cardiovascular:  Negative for chest pain.   Gastrointestinal:  Negative for heartburn.   Musculoskeletal:  Negative for myalgias.   Skin:  Negative for rash.   Neurological:  Positive for headaches.       Past Medical History:   Diagnosis Date    Acute sinusitis     Allergic 1999    Seasonal    Allergic rhinitis     Chronic sinusitis     last assessed 10/23/2012    Hyperlipemia     Kidney stone 05/2001    Other tear of medial meniscus, current injury, left knee, initial encounter 10/21/2022     Past Surgical History:   Procedure Laterality Date    CHOLECYSTECTOMY      COLONOSCOPY N/A 01/22/2016    Procedure: COLONOSCOPY;  Surgeon: Elijah Whitfield MD;  Location: [facility];  Service:     CYSTOSCOPY      4/13/22    HERNIA REPAIR      Inguinal    KIDNEY STONE SURGERY      In your records    KNEE SURGERY  1998    Both knees scoped, unsure of dates    LITHOTRIPSY      In your records    ORTHOPEDIC SURGERY Right 1997    Knee    TX ARTHRS KNE SURG W/MENISCECTOMY MED/LAT W/SHVG Left 11/16/2022    Procedure: ARTHROSCOPY KNEE with partial medial  MENISECTOMY and  patellofemoral chondroplasty;  Surgeon: Alvin Herrera MD;  Location: [facility];  Service: Orthopedics    NH CYSTOURETHROSCOPY N/A 04/13/2022    Procedure: CYSTO;  Surgeon: Jairo Sanford MD;  Location: [facility];  Service: Urology    NH LITHOLAPAXY COMP/LG > 2.5 CM N/A 04/13/2022    Procedure: LITHOLOPAXY LASER;  Surgeon: Jairo Sanford MD;  Location[facility];  Service: Urology    SHOULDER ARTHROSCOPY Right      Family History   Problem Relation Age of Onset    Nephrolithiasis Mother     Hyperlipidemia Mother         Under treatment    Hypertension Mother     Urolithiasis Mother     Coronary artery disease Mother     COPD Mother         Under treatment    Hyperlipidemia Father         Under treatment    Nephrolithiasis Brother     Multiple sclerosis Sister     Coronary artery disease Maternal Grandmother      Social History     Socioeconomic History    Marital status: /Civil Union     Spouse name: None    Number of children: None    Years of education: None    Highest education level: None   Occupational History     Employer: [employer]   Tobacco Use    Smoking status: Never    Smokeless tobacco: Never   Vaping Use    Vaping status: Never Used   Substance and Sexual Activity    Alcohol use: Yes     Alcohol/week: 6.0 standard drinks of alcohol     Types: 6 Cans of beer per week     Comment: social    Drug use: Never    Sexual activity: Yes     Partners: Female     Birth control/protection: None   Other Topics Concern    None   Social History Narrative    None     Social Determinants of Health     Financial Resource Strain: Not on file   Food Insecurity: Not on file   Transportation Needs: Not on file   Physical Activity: Not on file   Stress: Not on file   Social Connections: Not on file   Intimate Partner Violence: Not on file   Housing Stability: Not on file     Current Outpatient Medications on File Prior to Visit   Medication Sig    Ascorbic Acid (Vitamin C) 500 MG CHEW Chew 1 Dose daily  "   aspirin 81 MG tablet Take 81 mg by mouth daily.    atorvastatin (LIPITOR) 80 mg tablet Take 1 tablet (80 mg total) by mouth daily    b complex vitamins capsule Take 1 capsule by mouth daily.    Cholecalciferol (D3-1000) 25 MCG (1000 UT) capsule Take 2 capsules by mouth daily    Cinnamon 500 MG capsule     Loratadine 10 MG CAPS Take 1 capsule by mouth daily    Magnesium 500 MG TABS Take 1 tablet by mouth daily    Multiple Vitamin (MULTIVITAMIN) capsule Take 1 capsule by mouth daily.    Omega-3 Fatty Acids (fish oil) 1,000 mg Take 1,000 mg by mouth daily    tadalafil (CIALIS) 5 MG tablet Take 1 tablet (5 mg total) by mouth daily at bedtime    vitamin E, tocopherol, 400 units capsule Take 400 Units by mouth daily.    Zinc 50 MG TABS Take 1 tablet by mouth daily    fluticasone (FLONASE) 50 mcg/act nasal spray 1 spray into each nostril daily     No Known Allergies  Immunization History   Administered Date(s) Administered    COVID-19 PFIZER VACCINE 0.3 ML IM 03/30/2021, 04/20/2021, 01/08/2022    INFLUENZA 09/13/2018, 09/05/2021, 09/10/2022, 09/17/2023    Influenza Quadrivalent Preservative Free 3 years and older IM 09/17/2015, 09/06/2017    Influenza Quadrivalent, 6-35 Months IM 10/06/2016    Influenza, recombinant, quadrivalent,injectable, preservative free 09/13/2019, 09/08/2020    Influenza, seasonal, injectable 12/10/2012, 10/15/2013, 10/15/2013, 01/01/2014    Tdap 11/06/2014    Zoster Vaccine Recombinant 09/04/2020, 12/02/2020       Objective     /78 (BP Location: Left arm, Patient Position: Sitting, Cuff Size: Large)   Pulse 83   Temp 98 °F (36.7 °C)   Resp 16   Ht 6' 1\" (1.854 m)   Wt 105 kg (232 lb 8 oz)   SpO2 97%   BMI 30.67 kg/m²     BP Readings from Last 3 Encounters:   02/07/24 132/78   12/05/23 126/80   11/29/23 138/86      Wt Readings from Last 3 Encounters:   02/07/24 105 kg (232 lb 8 oz)   12/05/23 103 kg (226 lb)   11/27/23 101 kg (223 lb 4.8 oz)          Physical Exam  Vitals and nursing " note reviewed.   Constitutional:       General: He is not in acute distress.     Appearance: He is well-developed.   HENT:      Right Ear: Tympanic membrane and ear canal normal.      Left Ear: Tympanic membrane and ear canal normal.      Nose:      Right Sinus: No maxillary sinus tenderness or frontal sinus tenderness.      Left Sinus: No maxillary sinus tenderness or frontal sinus tenderness.      Mouth/Throat:      Mouth: No oral lesions.      Pharynx: No posterior oropharyngeal erythema.   Eyes:      Conjunctiva/sclera: Conjunctivae normal.   Neck:      Thyroid: No thyroid mass or thyromegaly.   Cardiovascular:      Rate and Rhythm: Normal rate and regular rhythm.      Heart sounds: Normal heart sounds. No murmur heard.     No gallop.   Pulmonary:      Breath sounds: Normal breath sounds. No wheezing or rales.   Lymphadenopathy:      Cervical: No cervical adenopathy.   Skin:     Findings: No rash.   Neurological:      Mental Status: He is alert and oriented to person, place, and time.       Stanislaw Ocampo MD

## 2024-02-07 NOTE — TELEPHONE ENCOUNTER
Gave patient message and patient stated he changed insurance and the forms he received say ED, Not [facility]

## 2024-02-07 NOTE — TELEPHONE ENCOUNTER
Message given verbally to     Price (Do Not Change): 0.00 Instructions: This plan will send the code FBSE to the PM system.  DO NOT or CHANGE the price. Detail Level: Simple

## 2024-02-14 ENCOUNTER — TELEPHONE (OUTPATIENT)
Age: 59
End: 2024-02-14

## 2024-02-19 ENCOUNTER — TELEPHONE (OUTPATIENT)
Age: 59
End: 2024-02-19

## 2024-02-19 NOTE — TELEPHONE ENCOUNTER
Pt called stating he didn't end up picking up the Augmentin that was prescribed. He said at the time he was starting to feel better but now he feels terrible. He has a fever of 101.7, lingering cough and achy.  Pt is asking should he get the Augmentin filled or what is suggested?

## 2024-02-20 NOTE — TELEPHONE ENCOUNTER
Called patient gave him message and asked him if would still like it filled patient stated he already got his medication, he was given a paper script last time he was here and took that to the pharmacy

## 2024-02-22 NOTE — TELEPHONE ENCOUNTER
Pt called back stating his insurance told pt it was not approved.   Pt asked if someone can call [facility] department  [phone]

## 2024-02-23 ENCOUNTER — TELEPHONE (OUTPATIENT)
Age: 59
End: 2024-02-23

## 2024-02-23 ENCOUNTER — APPOINTMENT (OUTPATIENT)
Dept: RADIOLOGY | Facility: MEDICAL CENTER | Age: 59
End: 2024-02-23
Payer: COMMERCIAL

## 2024-02-23 DIAGNOSIS — N20.0 NEPHROLITHIASIS: ICD-10-CM

## 2024-02-23 DIAGNOSIS — R05.8 RECURRENT COUGH: ICD-10-CM

## 2024-02-23 PROCEDURE — 74018 RADEX ABDOMEN 1 VIEW: CPT

## 2024-02-23 PROCEDURE — 71046 X-RAY EXAM CHEST 2 VIEWS: CPT

## 2024-02-23 NOTE — TELEPHONE ENCOUNTER
----- Message from Slick Farah sent at 2/23/2024 12:24 PM EST -----  Regarding: Rx Prior Auth Approval  Contact: [phone]  Thank you. However, my insurance changed on 11/12023.  I think the issue is with my new carrier.  Are you certain that my new insurance will approve of the med?

## 2024-02-23 NOTE — TELEPHONE ENCOUNTER
----- Message from Slick Farah sent at 2/23/2024  4:29 PM EST -----  Regarding: Rx Prior Auth Approval  Contact: [phone]  They should already have it.    RxBIN:  [id number]  RxGroup:  [id number]  Issuer:  [id number]  ID:  [id number]  Name:  Slick Farah  Issued:  11/5/2023

## 2024-02-23 NOTE — TELEPHONE ENCOUNTER
Tadalafil (CIALIS) 5 mg tablet PA Already Approved     Note from payer: CaseId:[id number];Status:Approved;Review Type:Prior Auth;Coverage Start Date:09/23/2023;Coverage End Date:10/22/2024;;CaseId:[id number];Status:Approved;Review Type:Prior Auth;Coverage Start Date:09/23/2023;Coverage End Date:10/22/2024;   Payer: [employer] Case ID: [id number]    [phone]    [phone]

## 2024-02-23 NOTE — TELEPHONE ENCOUNTER
Sent pt [employer] message to upload insurance so we can confirm that we have the correct insurance to resubmit auth

## 2024-02-26 ENCOUNTER — EVALUATION (OUTPATIENT)
Dept: PHYSICAL THERAPY | Age: 59
End: 2024-02-26
Payer: COMMERCIAL

## 2024-02-26 ENCOUNTER — NURSE TRIAGE (OUTPATIENT)
Dept: PHYSICAL THERAPY | Facility: OTHER | Age: 59
End: 2024-02-26

## 2024-02-26 DIAGNOSIS — G89.29 ACUTE EXACERBATION OF CHRONIC LOW BACK PAIN: Primary | ICD-10-CM

## 2024-02-26 DIAGNOSIS — M54.50 ACUTE EXACERBATION OF CHRONIC LOW BACK PAIN: Primary | ICD-10-CM

## 2024-02-26 PROCEDURE — 97162 PT EVAL MOD COMPLEX 30 MIN: CPT | Performed by: PHYSICAL THERAPIST

## 2024-02-26 PROCEDURE — 97110 THERAPEUTIC EXERCISES: CPT | Performed by: PHYSICAL THERAPIST

## 2024-02-26 NOTE — TELEPHONE ENCOUNTER
Additional Information   Negative: Has the patient had unexplained weight loss?   Negative: Does the patient have a fever?   Negative: Is the patient experiencing blood in sputum?   Negative: Is the patient experiencing urine retention?   Negative: Is the patient experiencing acute drop foot or paralysis?   Negative: Has the patient experienced major trauma? (fall from height, high speed collision, direct blow to spine) and is also experiencing nausea, light-headedness, or loss of consciousness?   Affirmative: Is this a chronic condition?    Protocols used: [facility] Protocol    Comprehensive Spine Program was reviewed in detail and what we can provide for their back pain.  Patient is agreeable to being triaged and would like to proceed with Physical Therapy.    Referral was placed for Physical Therapy at the [place] site. Patients information was sent to the  to make evaluation appointment. Patient made aware that the PT office  will be calling to schedule the appointment.  Patient was provided with the phone number to the PT office.    No further questions and/or concerns were voiced by the patient at this time. Patient states understanding of the referral that was placed.,o    Referral Closed.

## 2024-02-26 NOTE — PROGRESS NOTES
PT Evaluation     Today's date: 2024  Patient name: Slick Farah  : 1965  MRN: [id number]  Referring provider: John Javed PT  Dx:   Encounter Diagnosis     ICD-10-CM    1. Acute exacerbation of chronic low back pain  M54.50 Ambulatory referral to PT spine    G89.29           Start Time: 1745  Stop Time: 1830  Total time in clinic (min): 45 minutes    Assessment details: Pt is a 59 y/o male who presents with acute on chronic lbo. No further referral is necessary at this time. Pt has a movement impairment diagnosis of directional preferences into extension representing a pathoantomical diagnosis of lumbar muscle strain Patient also demonstrates decreased nerve mobility which is also contributing to his symptoms. No red flag symptoms or neurologic signs. Pt is experiencing pain, decreased strength, and decreased ROM. Pt has a positive prognosis. Pt would benefit from PT to address these impairments leading to increased functional capacity and improved quality of life.    Impairments: abnormal or restricted ROM, impaired physical strength, lacks appropriate home exercise program, pain with function, poor posture  and poor body mechanics  Understanding of Dx/Px/POC: good   Prognosis: good     Goals  Short Term Goals: to be achieved by 4 weeks  1) Patient to be independent with basic HEP.  2) Decrease pain to 2/10 at its worst.  3) Increase lumbar spine ROM to minimal deficits in all deficient planes.   4) Increase LE strength by 1/2 MMT grade in all deficient planes.     Long Term Goals: to be achieved by discharge  1) FOTO equal to or greater than 65.  2) Patient to be independent with comprehensive HEP.  3) Lumbar spine ROM WNL all planes to improve a/iadls.  4) Increase LE strength to 5/5 MMT grade in all planes to improve a/iadls.  5) Patient to report no sleep interruption secondary to pain.  6) Increase ambulatory tolerance to 60 min.      Plan  Patient would benefit from: skilled physical  therapy  Planned modality interventions: cryotherapy and thermotherapy: hydrocollator packs  Planned therapy interventions: neuromuscular re-education, patient education, stretching, strengthening, therapeutic activities, therapeutic exercise, therapeutic training, home exercise program and graded activity  Frequency: Twice a week for 8 weeks.  Treatment plan discussed with: patient           Subjective Evaluation     History of Present Illness  Mechanism of injury: Patient has had very high levels of pain since Wednesday when he changed headlights in his wife's car. Patient describes it as a spasm pain that is across the back and not into the legs. Gets stuck in a hunched position. Laying on his side his the best position. Pain is worse in the morning. Patient has a history of chronic back pain but this is the worst episode.   Quality of life: good     Patient Goals  Patient goals for therapy: decreased pain, independence with ADLs/IADLs, return to sport/leisure activities, increased strength and increased motion     Pain  Current pain ratin  At best pain ratin  At worst pain ratin  Quality: throbbing, tight, squeezing, discomfort, cramping and burning  Aggravating factors: overhead activity, keyboarding and lifting     Hand dominance: right              Objective      Concurrent Complaints  Negative for night pain, disturbed sleep, bladder dysfunction, bowel dysfunction, saddle (S4) numbness, cardiac problem, kidney problem, gallbladder problem, stomach problem, ulcer, appendix problem, spleen problem, pancreas problem, history of cancer, history of trauma and infection     Tenderness      Additional Tenderness Details  No tenderness with spring testing but decrease joint mobility throughout lumbar spine     Neurological Testing      Sensation      Lumbar   Left   Intact: light touch     Right   Intact: light touch     Reflexes   Left   Patellar (L4): normal (2+)  Achilles (S1): normal (2+)  Babinski  sign: negative  Clonus sign: negative     Right   Patellar (L4): normal (2+)  Achilles (S1): normal (2+)  Babinski sign: negative  Clonus sign: negative     Active Range of Motion      Lumbar   Flexion:  Restriction level: minimal  Extension:  Restriction level: moderate  Left lateral flexion:  Restriction level: moderate     Joint Play      Hypomobile: L2, L3, L4 and L5      Tests      Lumbar     Slump: Positive     Left   Negative passive SLR.      Right   Negative passive SLR.      Left Hip   Negative RENEA.      Right Hip   Negative RENEA.      General Comments:        Ankle/Foot Comments   Moderate limitations in ankle mobility bilaterally.              Precautions: History of hernia repair      Manuals                                                                 Neuro Re-Ed                                                                                                        Ther Ex             PPU HEP            CROW HEP            Standing lumbar extension HEP                                                                             Ther Activity                                       Gait Training                                       Modalities

## 2024-02-26 NOTE — TELEPHONE ENCOUNTER
Additional Information   Negative: Is this related to a work injury?   Negative: Is this related to an MVA?   Negative: Are you currently recieving homecare services?    Background - Initial Assessment  Clinical complaint: Pain is bilat low back, no radiation, no pain in legs. No numbness or tingling. Pt has a HX of chronic low back with out sciatica, also right low back, lumbar disc herniation, Right hip pain, and DDD. In past has been treated by PCP, PM and PT. Pt states his past pain always included sciatica. Although not indicated in his chart or prior DX. Pt states this pain started 2/21/24, with no injury or trauma. No enticing event. Started taking left over gabapentin, but not helping. Has not seen anyone since pain started. Only position that makes pain feel better is standing up, hunched over and bent at the waist. Pain is constant and feels aching, throbbing and shooting.   Date of onset: 2/21/24  Frequency of pain: constant  Quality of pain: aching, shooting, and throbbing    Protocols used: [facility] Protocol

## 2024-02-29 ENCOUNTER — OFFICE VISIT (OUTPATIENT)
Dept: PHYSICAL THERAPY | Age: 59
End: 2024-02-29
Payer: COMMERCIAL

## 2024-02-29 DIAGNOSIS — M54.50 ACUTE EXACERBATION OF CHRONIC LOW BACK PAIN: Primary | ICD-10-CM

## 2024-02-29 DIAGNOSIS — G89.29 ACUTE EXACERBATION OF CHRONIC LOW BACK PAIN: Primary | ICD-10-CM

## 2024-02-29 PROCEDURE — 97110 THERAPEUTIC EXERCISES: CPT | Performed by: PHYSICAL THERAPIST

## 2024-02-29 PROCEDURE — 97140 MANUAL THERAPY 1/> REGIONS: CPT | Performed by: PHYSICAL THERAPIST

## 2024-02-29 PROCEDURE — 97112 NEUROMUSCULAR REEDUCATION: CPT | Performed by: PHYSICAL THERAPIST

## 2024-02-29 NOTE — PROGRESS NOTES
"Daily Note     Today's date: 2024  Patient name: Slick Farah  : 1965  MRN: [id number]  Referring provider: John Javed, SALOME  Dx:   Encounter Diagnosis     ICD-10-CM    1. Acute exacerbation of chronic low back pain  M54.50     G89.29           Start Time: 1700  Stop Time: 1745  Total time in clinic (min): 45 minutes    Subjective: Pt reports slight centralization of symptoms.       Objective: See treatment diary below      Assessment: Tolerated treatment well. POC progressed to include more extension based  and core stabilization interventions to increase tolerance to work related activities.  Patient demonstrated fatigue post treatment      Plan: Continue per plan of care.      Precautions: History of hernia repair      Manuals             CPA L3-L5  JF                                                  Neuro Re-Ed             TrA  3x10x5\"                                                                                         Ther Ex             PPU HEP 3x10           CROW HEP 5'           Standing lumbar extension HEP 3x10           LTR  15x5\"                                                               Ther Activity                                       Gait Training                                       Modalities                                            "

## 2024-03-05 ENCOUNTER — OFFICE VISIT (OUTPATIENT)
Dept: PHYSICAL THERAPY | Age: 59
End: 2024-03-05
Payer: COMMERCIAL

## 2024-03-05 DIAGNOSIS — G89.29 ACUTE EXACERBATION OF CHRONIC LOW BACK PAIN: Primary | ICD-10-CM

## 2024-03-05 DIAGNOSIS — M54.50 ACUTE EXACERBATION OF CHRONIC LOW BACK PAIN: Primary | ICD-10-CM

## 2024-03-05 PROCEDURE — 97140 MANUAL THERAPY 1/> REGIONS: CPT | Performed by: PHYSICAL THERAPIST

## 2024-03-05 PROCEDURE — 97110 THERAPEUTIC EXERCISES: CPT | Performed by: PHYSICAL THERAPIST

## 2024-03-05 NOTE — PROGRESS NOTES
"Daily Note     Today's date: 3/5/2024  Patient name: Slick Farah  : 1965  MRN: [id number]  Referring provider: John Javed PT  Dx:   Encounter Diagnosis     ICD-10-CM    1. Acute exacerbation of chronic low back pain  M54.50     G89.29                      Subjective: Patient states decreased pain since beginning PT treatment, stated pain level 4/10 prior to treatment session.       Objective: See treatment diary below      Assessment: Patient stated increased pain with extension in standing against table and left lateral side glides against wall; no significant change in pain level with right lateral side glides; demonstrated mild decrease in pain level after completion of prone lying in extension.       Plan: Continue per plan of care.      Precautions: History of hernia repair      Manuals  2/29 3/5          CPA L3-L5  JF KK                                                 Neuro Re-Ed             TrA  3x10x5\" np                                                                                        Ther Ex             PPU HEP 3x10 3x10          CROW HEP 5' 5'          Standing lumbar extension HEP 3x10 Against table 3x10          LTR  15x5\" np          PPU w/PT OP   10x          Left lateral side glides against wall   2x10          Right lateral side glides against wall   20x2          Lying in prone in extension (traction table)   5 min          Ther Activity                                       Gait Training                                       Modalities                                            "

## 2024-03-08 ENCOUNTER — OFFICE VISIT (OUTPATIENT)
Dept: PHYSICAL THERAPY | Age: 59
End: 2024-03-08
Payer: COMMERCIAL

## 2024-03-08 ENCOUNTER — TELEPHONE (OUTPATIENT)
Dept: FAMILY MEDICINE CLINIC | Facility: CLINIC | Age: 59
End: 2024-03-08

## 2024-03-08 DIAGNOSIS — M54.50 ACUTE EXACERBATION OF CHRONIC LOW BACK PAIN: Primary | ICD-10-CM

## 2024-03-08 DIAGNOSIS — G89.29 ACUTE EXACERBATION OF CHRONIC LOW BACK PAIN: Primary | ICD-10-CM

## 2024-03-08 DIAGNOSIS — M51.36 LUMBAR DEGENERATIVE DISC DISEASE: Primary | ICD-10-CM

## 2024-03-08 PROCEDURE — 97110 THERAPEUTIC EXERCISES: CPT | Performed by: PHYSICAL THERAPIST

## 2024-03-08 PROCEDURE — 97140 MANUAL THERAPY 1/> REGIONS: CPT | Performed by: PHYSICAL THERAPIST

## 2024-03-08 RX ORDER — MELOXICAM 7.5 MG/1
7.5 TABLET ORAL DAILY
Qty: 30 TABLET | Refills: 1 | Status: SHIPPED | OUTPATIENT
Start: 2024-03-08 | End: 2024-04-07

## 2024-03-08 NOTE — TELEPHONE ENCOUNTER
----- Message from Stanislaw Ocampo MD sent at 3/8/2024  3:39 PM EST -----  Regarding: RE: LBP  Call patient  I received a message from his physical therapist re medication for his back pain PT suggested in either a trial of Mobic daily an anti inflammatory or a muscle relaxer. If interested let me know and I will send a script JJM   ----- Message -----  From: Gino Lopez, PT  Sent: 3/8/2024  11:04 AM EST  To: Stanislaw Ocampo MD  Subject: LBP                                              Hey Dr. Ocampo,    I have been seeing your patient Slick for 4 visits now through the [facility] for lumbar spine pain. His symptoms have centralized and are pretty localized to his left PSIS but is still having a decent amount of pain. He does report muscle spasms from time to time. Do you think he would benefit from a prescription of mobic or a muscle relaxer?     Thank you,  Gino

## 2024-03-08 NOTE — PROGRESS NOTES
"Daily Note     Today's date: 3/8/2024  Patient name: Slick Farah  : 1965  MRN: [id number]  Referring provider: John Javed, SALOME  Dx:   Encounter Diagnosis     ICD-10-CM    1. Acute exacerbation of chronic low back pain  M54.50     G89.29           Start Time: 1015  Stop Time: 1101  Total time in clinic (min): 46 minutes    Subjective: Pt reports centralization of symptoms.       Objective: See treatment diary below      Assessment: Tolerated treatment well. Pt's force progressed to include more PT with OP.  Patient demonstrated fatigue post treatment and would benefit from continued PT      Plan: Continue per plan of care.      Precautions: History of hernia repair      Manuals  2/29 3/5 3/8         CPA L3-L5  JF KK JF                                                Neuro Re-Ed             TrA  3x10x5\" np nv                                                                                       Ther Ex             PPU HEP 3x10 3x10 3x10          CROW HEP 5' 5' 105'         Standing lumbar extension HEP 3x10 Against table 3x10 3x10         LTR  15x5\" np          PPU w/PT OP   10x 3x10         Left lateral side glides against wall   2x10          Right lateral side glides against wall   20x2          Lying in prone in extension (traction table)   5 min 5'         Ther Activity                                       Gait Training                                       Modalities                                              "

## 2024-03-12 ENCOUNTER — OFFICE VISIT (OUTPATIENT)
Dept: PHYSICAL THERAPY | Age: 59
End: 2024-03-12
Payer: COMMERCIAL

## 2024-03-12 DIAGNOSIS — G89.29 ACUTE EXACERBATION OF CHRONIC LOW BACK PAIN: Primary | ICD-10-CM

## 2024-03-12 DIAGNOSIS — M54.50 ACUTE EXACERBATION OF CHRONIC LOW BACK PAIN: Primary | ICD-10-CM

## 2024-03-12 PROCEDURE — 97112 NEUROMUSCULAR REEDUCATION: CPT | Performed by: PHYSICAL THERAPIST

## 2024-03-12 PROCEDURE — 97110 THERAPEUTIC EXERCISES: CPT | Performed by: PHYSICAL THERAPIST

## 2024-03-12 PROCEDURE — 97140 MANUAL THERAPY 1/> REGIONS: CPT | Performed by: PHYSICAL THERAPIST

## 2024-03-12 NOTE — PROGRESS NOTES
"Daily Note     Today's date: 3/12/2024  Patient name: Slick Farah  : 1965  MRN: [id number]  Referring provider: John Javed, SALOME  Dx:   Encounter Diagnosis     ICD-10-CM    1. Acute exacerbation of chronic low back pain  M54.50     G89.29           Start Time: 1015  Stop Time: 1100  Total time in clinic (min): 45 minutes    Subjective: Pt reports he is back to his baseline from before acute onset of pain.       Objective: See treatment diary below      Assessment: Tolerated treatment well. POC progressed to include more core stabilization interventions. Patient demonstrated fatigue post treatment and would benefit from continued PT      Plan: Continue per plan of care.      Precautions: History of hernia repair      Manuals  2/29 3/5 3/8 3/12        CPA L3-L5  JF KK JF JF                                               Neuro Re-Ed             TrA  3x10x5\" np nv BKFO 3x10        Palloff press     2x10 b/l                                                                         Ther Ex             PPU HEP 3x10 3x10 3x10  3x10        CROW HEP 5' 5' 10' 5'        Standing lumbar extension HEP 3x10 Against table 3x10 3x10         LTR  15x5\" np          PPU w/PT OP   10x 3x10 3x10        Left lateral side glides against wall   2x10          Right lateral side glides against wall   20x2          Lying in prone in extension (traction table)   5 min 5'         Ther Activity                                       Gait Training                                       Modalities                                                "

## 2024-03-14 ENCOUNTER — APPOINTMENT (OUTPATIENT)
Dept: PHYSICAL THERAPY | Age: 59
End: 2024-03-14
Payer: COMMERCIAL

## 2024-03-15 ENCOUNTER — OFFICE VISIT (OUTPATIENT)
Dept: PHYSICAL THERAPY | Age: 59
End: 2024-03-15
Payer: COMMERCIAL

## 2024-03-15 DIAGNOSIS — M54.50 ACUTE EXACERBATION OF CHRONIC LOW BACK PAIN: Primary | ICD-10-CM

## 2024-03-15 DIAGNOSIS — G89.29 ACUTE EXACERBATION OF CHRONIC LOW BACK PAIN: Primary | ICD-10-CM

## 2024-03-15 PROCEDURE — 97140 MANUAL THERAPY 1/> REGIONS: CPT

## 2024-03-15 PROCEDURE — 97112 NEUROMUSCULAR REEDUCATION: CPT

## 2024-03-15 PROCEDURE — 97110 THERAPEUTIC EXERCISES: CPT

## 2024-03-15 NOTE — PROGRESS NOTES
"Daily Note / Short D/C note     Today's date: 3/15/2024  Patient name: Slick Farah  : 1965  MRN: [id number]  Referring provider: John Javed, SALOME  Dx:   Encounter Diagnosis     ICD-10-CM    1. Acute exacerbation of chronic low back pain  M54.50     G89.29           Start Time: 0854  Stop Time: 09  Total time in clinic (min): 41 minutes    Subjective: Pt reports he is doing okay, his pain is minimal this morning. Has been using the hot tub, which helps with his tightness in his back.       Objective: See treatment diary below      Assessment: Tolerated treatment well. Patient demonstrated fatigue post treatment and exhibited good technique with therapeutic exercises. Today is pt's last visit, per pt request, as he felt he is back to his baseline; pt wants to continue with HEP at home. Issued updated HEP with handout and black TB provided. Instructed pt to contact us should he have any issues in the future.       Plan: D/C after today's visit.      Precautions: History of hernia repair      Manuals  2/29 3/5 3/8 3/12 3/15       CPA L3-L5  JF KK JF JF NA                                              Neuro Re-Ed             TrA  3x10x5\" np nv BKFO 3x10 BKFO 3x10       Palloff press     2x10 b/l 2x10   B/l GTB       TrA core brace with bridge      2x10 5\"                                                            Ther Ex             PPU HEP 3x10 3x10 3x10  3x10 3x10       CROW HEP 5' 5' 10' 5' 5'       Standing lumbar extension HEP 3x10 Against table 3x10 3x10  3x10       LTR  15x5\" np          PPU w/PT OP   10x 3x10 3x10 3x10        Left lateral side glides against wall   2x10          Right lateral side glides against wall   20x2          Lying in prone in extension (traction table)   5 min 5'         Ther Activity             UBE with core brace       6 min alt                     Gait Training                                       Modalities                                                  "

## 2024-03-28 ENCOUNTER — APPOINTMENT (OUTPATIENT)
Dept: LAB | Facility: MEDICAL CENTER | Age: 59
End: 2024-03-28
Payer: COMMERCIAL

## 2024-03-28 ENCOUNTER — TELEPHONE (OUTPATIENT)
Age: 59
End: 2024-03-28

## 2024-03-28 DIAGNOSIS — Z12.5 SCREENING FOR PROSTATE CANCER: ICD-10-CM

## 2024-03-28 LAB
ALBUMIN SERPL BCP-MCNC: 4.4 G/DL (ref 3.5–5)
ALP SERPL-CCNC: 72 U/L (ref 34–104)
ALT SERPL W P-5'-P-CCNC: 33 U/L (ref 7–52)
ANION GAP SERPL CALCULATED.3IONS-SCNC: 9 MMOL/L (ref 4–13)
AST SERPL W P-5'-P-CCNC: 20 U/L (ref 13–39)
BILIRUB SERPL-MCNC: 0.78 MG/DL (ref 0.2–1)
BUN SERPL-MCNC: 24 MG/DL (ref 5–25)
CALCIUM SERPL-MCNC: 9.5 MG/DL (ref 8.4–10.2)
CHLORIDE SERPL-SCNC: 103 MMOL/L (ref 96–108)
CHOLEST SERPL-MCNC: 214 MG/DL
CO2 SERPL-SCNC: 26 MMOL/L (ref 21–32)
CREAT SERPL-MCNC: 0.92 MG/DL (ref 0.6–1.3)
EST. AVERAGE GLUCOSE BLD GHB EST-MCNC: 111 MG/DL
GFR SERPL CREATININE-BSD FRML MDRD: 90 ML/MIN/1.73SQ M
GLUCOSE P FAST SERPL-MCNC: 95 MG/DL (ref 65–99)
HBA1C MFR BLD: 5.5 %
HDLC SERPL-MCNC: 62 MG/DL
LDLC SERPL CALC-MCNC: 103 MG/DL (ref 0–100)
NONHDLC SERPL-MCNC: 152 MG/DL
POTASSIUM SERPL-SCNC: 4.9 MMOL/L (ref 3.5–5.3)
PROT SERPL-MCNC: 6.9 G/DL (ref 6.4–8.4)
PSA SERPL-MCNC: 0.81 NG/ML (ref 0–4)
SODIUM SERPL-SCNC: 138 MMOL/L (ref 135–147)
TRIGL SERPL-MCNC: 243 MG/DL

## 2024-03-28 PROCEDURE — 36415 COLL VENOUS BLD VENIPUNCTURE: CPT

## 2024-03-28 PROCEDURE — G0103 PSA SCREENING: HCPCS

## 2024-03-28 NOTE — TELEPHONE ENCOUNTER
The patient called to inform that he needs the doctor to call [facility] for his medication tadalafil (CIALIS) 5 MG tablet. They need to talk to him verbally so they can give him his medication.    Phone number [phone]  Member id : [id number]

## 2024-03-29 DIAGNOSIS — R35.1 BENIGN PROSTATIC HYPERPLASIA WITH NOCTURIA: ICD-10-CM

## 2024-03-29 DIAGNOSIS — N40.1 BENIGN PROSTATIC HYPERPLASIA WITH NOCTURIA: ICD-10-CM

## 2024-03-29 RX ORDER — TADALAFIL 5 MG/1
5 TABLET ORAL
Qty: 90 TABLET | Refills: 3 | Status: SHIPPED | OUTPATIENT
Start: 2024-03-29

## 2024-04-01 ENCOUNTER — TELEPHONE (OUTPATIENT)
Age: 59
End: 2024-04-01

## 2024-04-01 DIAGNOSIS — E78.01 FAMILIAL HYPERCHOLESTEROLEMIA: ICD-10-CM

## 2024-04-01 RX ORDER — ATORVASTATIN CALCIUM 80 MG/1
80 TABLET, FILM COATED ORAL DAILY
Qty: 90 TABLET | Refills: 1 | Status: SHIPPED | OUTPATIENT
Start: 2024-04-01

## 2024-04-01 NOTE — TELEPHONE ENCOUNTER
Called patient gave him message patient stated he appreciated that  sent the medication in but he needs a prior authorization done. Patient states he has 3 [facility]. And they wont send him his medication due to pending medication.   He called the office this morning and spoke with someone about do a prior auth.   Patient gave phone number to call [phone] and member ID [id number].

## 2024-04-01 NOTE — TELEPHONE ENCOUNTER
Reason for call:   [x] Refill   [] Prior Auth  [] Other:     Office:   [x] PCP/Provider - Terrence  [] Specialty/Provider -     Medication: atorvastatin 80 mg    Pharmacy:   [facility]    Does the patient have enough for 3 days?   [x] Yes   [] No - Send as HP to POD

## 2024-04-01 NOTE — TELEPHONE ENCOUNTER
Reason for call:     Patient states his insurance changed in Nov of 2023. States he spoke with [facility] on 3/29/24 and they are stating he needs a prior auth and a quantity exception case for the tadalafil 5 mg.     It appears a prior auth was done in sept 2023 for the tadalafil but that would have been under the old insurance.     Please call patient if you have any questions: [phone]        [] Refill   [x] Prior Auth  [] Other:     Office:   [x] PCP/Provider - Dr Ocampo  [] Specialty/Provider -     Medication: tadalafil 5 mg

## 2024-04-03 NOTE — TELEPHONE ENCOUNTER
PA for Tadalafil    Submitted via    []CMM-KEY   [][employer]-Case ID # [id number]   []Faxed to plan   []Other website   [x]Phone call Case ID #     Office notes sent, clinical questions answered. Awaiting determination    Turnaround time for your insurance to make a decision on your Prior Authorization can take 7-21 business days.

## 2024-04-08 NOTE — TELEPHONE ENCOUNTER
PA for Tadalafil (CIALIS) 5 mg tablet    Submitted via    []CMM-KEY   [x][employer]-Case ID # [id number]  []Faxed to plan   []Other website   []Phone call Case ID #     Office notes sent, clinical questions answered. Awaiting determination    Turnaround time for your insurance to make a decision on your Prior Authorization can take 7-21 business days.

## 2024-04-11 NOTE — TELEPHONE ENCOUNTER
PA for Tadalafil (CIALIS) 5 mg tablet Approved     Date(s) approved 3-5-2024 - 4-4-2025    Patient advised by [x] [employer] Message                      [] Phone call       Pharmacy advised by [x]Fax                                     []Phone call    Approval letter scanned into Media Yes

## 2024-05-06 ENCOUNTER — NURSE TRIAGE (OUTPATIENT)
Age: 59
End: 2024-05-06

## 2024-05-06 NOTE — TELEPHONE ENCOUNTER
"Patient called in to report mild to moderate left lower abdominal pain for the past several months with some problems moving his bowels. Does move bowels daily but if he strains, he may have bright red blood in toilet bowl; not in stool, and then it stops. Also has problem with emptiying bladder when sitting; has to stand to do that. Patient reports this is similar to pain and hernia repair > 25 years ago. OV scheduled with PCP for Wed 5/8 at 3:40 PM. ER precautions given if abdominal pain becomes severe or not able to move bowels. Patient verbalized understanding.    Reason for Disposition   MODERATE pain (e.g., interferes with normal activities) that comes and goes (cramps) lasts > 24 hours  (Exception: pain with Vomiting or Diarrhea - see that Protocol)    Answer Assessment - Initial Assessment Questions  1. LOCATION: \"Where does it hurt?\"       Lower left abdomen near site of inguinal hernia repair >25 years ago  2. RADIATION: \"Does the pain shoot anywhere else?\" (e.g., chest, back)      To pubic area towards hip  3. ONSET: \"When did the pain begin?\" (Minutes, hours or days ago)       Several months with worsening  4. SUDDEN: \"Gradual or sudden onset?\"      Gradual onset  5. PATTERN \"Does the pain come and go, or is it constant?\"     - If constant: \"Is it getting better, staying the same, or worsening?\"       (Note: Constant means the pain never goes away completely; most serious pain is constant and it progresses)      - If intermittent: \"How long does it last?\" \"Do you have pain now?\"      (Note: Intermittent means the pain goes away completely between bouts)      Constant tenderness that worsens intermittently  6. SEVERITY: \"How bad is the pain?\"  (e.g., Scale 1-10; mild, moderate, or severe)     - MILD (1-3): doesn't interfere with normal activities, abdomen soft and not tender to touch      - MODERATE (4-7): interferes with normal activities or awakens from sleep, tender to touch      - SEVERE (8-10): " "excruciating pain, doubled over, unable to do any normal activities        5-6/10 at its worst; remains around 2-3/10 constantly  7. RECURRENT SYMPTOM: \"Have you ever had this type of stomach pain before?\" If Yes, ask: \"When was the last time?\" and \"What happened that time?\"       Similar to hernia pain in past   8. CAUSE: \"What do you think is causing the stomach pain?\"      Hernia  9. RELIEVING/AGGRAVATING FACTORS: \"What makes it better or worse?\" (e.g., movement, antacids, bowel movement)      Hurt lower back one month ago and pain radiates to same area  10. OTHER SYMPTOMS: \"Has there been any vomiting, diarrhea, constipation, or urine problems?\"        Urge to move bowels, no output then able to move bowels daily with bloating, rectal bleeding bright red blood not in stool when straining. Some hesitancy with urination; empties better when standing.    Protocols used: Abdominal Pain - Male-ADULT-[place]    "

## 2024-05-06 NOTE — TELEPHONE ENCOUNTER
"Regarding: poss hernia - tenderness feeling is worsening  ----- Message from Danita Carroll sent at 5/6/2024 10:19 AM EDT -----  Patient called in stating he has a possible hernia. Started several months ago but the tenderness is worsening. Please advise.  Per escalation guidelines \"Sudden change in any condition and symptoms worsening\"    "

## 2024-05-08 ENCOUNTER — OFFICE VISIT (OUTPATIENT)
Dept: FAMILY MEDICINE CLINIC | Facility: CLINIC | Age: 59
End: 2024-05-08
Payer: COMMERCIAL

## 2024-05-08 VITALS
RESPIRATION RATE: 16 BRPM | HEART RATE: 78 BPM | WEIGHT: 231.5 LBS | DIASTOLIC BLOOD PRESSURE: 78 MMHG | SYSTOLIC BLOOD PRESSURE: 118 MMHG | TEMPERATURE: 97.8 F | HEIGHT: 73 IN | OXYGEN SATURATION: 97 % | BODY MASS INDEX: 30.68 KG/M2

## 2024-05-08 DIAGNOSIS — R10.32 LLQ ABDOMINAL PAIN: Primary | ICD-10-CM

## 2024-05-08 PROBLEM — J40 BRONCHITIS: Status: RESOLVED | Noted: 2023-11-15 | Resolved: 2024-05-08

## 2024-05-08 PROCEDURE — 99214 OFFICE O/P EST MOD 30 MIN: CPT | Performed by: FAMILY MEDICINE

## 2024-05-08 NOTE — PROGRESS NOTES
Name: Slick Farah      : 1965      MRN: [id number]  Encounter Provider: Stanislaw Ocampo MD  Encounter Date: 2024   Encounter department: [facility]    Assessment & Plan     1. LLQ abdominal pain  -     CT abdomen pelvis w contrast; Future; Expected date: 2024    Recurrent left lower quadrant abdominal pain-scar pain? Less likely diverticulitis  Schedule CT scan of abdomen and pelvis. If negative consider injection of painful scar  Advised to call if any changes       Subjective     Several month history of intermittent left lower abdominal pain.  Prior left inguinal herniorrhaphy in the .  No new bowel or bladder changes.  Colonoscopy 2021 normal except for mild diverticulosis sigmoid colon.  No prior history of diverticulitis.    2022 cystoscopy for bladder stone. 2022 KUB Bilateral nephrolithiasis. Probable 13 mm bladder calculus. 2021 MRI Stable size of a mildly complex right renal cyst. History of kidney stones followed by Urology.  Prior surgery and lithotripsy.  2017 CT scan of abdomen and pelvis bilateral renal cystic lesions.  Stable small right hepatic lobe cysts 9 mm.  3 mm calculus right mid pole kidney.  No hydronephrosis.  Left kidney minute calculus fragments visible mid to lower pole      Familial hyperlipidemia mixed type on Atorvastatin 80 mg daily and 1 fish oil capsule/day.  Prior treatment with Lopid and Fenofibrate.  Pre treatment cholesterol 400 range and triglyceride range 600. 2024 Lipid profile cholesterol 214. Triglycerides 243. HDL 62. . LFTs normal. FBS 95. A1c 5.5.       Recent Results (from the past 2016 hour(s))   Comprehensive metabolic panel    Collection Time: 24  7:24 AM   Result Value Ref Range    Sodium 138 135 - 147 mmol/L    Potassium 4.9 3.5 - 5.3 mmol/L    Chloride 103 96 - 108 mmol/L    CO2 26 21 - 32 mmol/L    ANION GAP 9 4 - 13 mmol/L    BUN 24 5 - 25 mg/dL    Creatinine 0.92 0.60 - 1.30 mg/dL     Glucose, Fasting 95 65 - 99 mg/dL    Calcium 9.5 8.4 - 10.2 mg/dL    AST 20 13 - 39 U/L    ALT 33 7 - 52 U/L    Alkaline Phosphatase 72 34 - 104 U/L    Total Protein 6.9 6.4 - 8.4 g/dL    Albumin 4.4 3.5 - 5.0 g/dL    Total Bilirubin 0.78 0.20 - 1.00 mg/dL    eGFR 90 ml/min/1.73sq m   Lipid panel    Collection Time: 03/28/24  7:24 AM   Result Value Ref Range    Cholesterol 214 (H) See Comment mg/dL    Triglycerides 243 (H) See Comment mg/dL    HDL, Direct 62 >=40 mg/dL    LDL Calculated 103 (H) 0 - 100 mg/dL    Non-HDL-Chol (CHOL-HDL) 152 mg/dl   Hemoglobin A1C    Collection Time: 03/28/24  7:24 AM   Result Value Ref Range    Hemoglobin A1C 5.5 Normal 4.0-5.6%; PreDiabetic 5.7-6.4%; Diabetic >=6.5%; Glycemic control for adults with diabetes <7.0% %     mg/dl   PSA, Total Screen    Collection Time: 03/28/24  7:24 AM   Result Value Ref Range    PSA 0.81 0.00 - 4.00 ng/mL           Review of Systems   Constitutional:  Negative for appetite change, chills, fever and unexpected weight change.   Respiratory:  Negative for cough, shortness of breath and wheezing.    Cardiovascular:  Negative for chest pain, palpitations and leg swelling.   Gastrointestinal:  Positive for abdominal pain and blood in stool. Negative for constipation, diarrhea, nausea and vomiting.        Status post cholecystectomy. History of colon polyps. 07/2021 colonoscopy no polyps   Endocrine: Negative for polydipsia and polyuria.   Genitourinary:  Negative for difficulty urinating.        See HPI. 06/2022 PVR 65 ML on daily use Cialis       Lab Results       Component                Value               Date                       PSA                      0.6                 02/05/2022                 PSA                      0.4                 03/13/2021                 PSA                      0.5                 01/04/2019                 Musculoskeletal:  Positive for back pain. Negative for arthralgias and myalgias.        S/p L knee  arthroscopy 11/2022 Chronic right sided lower back with intermittent R leg pain. No R leg weakness or numbness. 05/2021 MRI lumbar spine  Inferiorly extruded or possibly sequestered disc herniation at the L5-S1 level partially effacing the ventral epidural space and abutting the thecal sac and left S1 nerve. Degenerative disc disease at L4-5 with annular bulging and a small right foraminal disc protrusion.  Moderate right foraminal narrowing with disc material abutting and slightly displacing the exiting nerve.  Correlate for right L4 radiculopathy. At L3-4 there is moderate bilateral foraminal narrowing secondary to annular bulging and broad-based foraminal disc protrusions. 09/2021 L5-S1 interlaminar lumbar GERARDO for right lumbar radiculopathy.  02/2017 CT scan abdomen and pelvis severe chronic disc degeneration present lumbosacral junction.  He completed PT.    Skin:  Negative for rash.   Allergic/Immunologic: Negative for environmental allergies.   Neurological:  Negative for dizziness, weakness and headaches.   Hematological:  Negative for adenopathy. Does not bruise/bleed easily.   Psychiatric/Behavioral:  Negative for dysphoric mood and sleep disturbance.        Past Medical History:   Diagnosis Date    Acute sinusitis     Allergic 1999    Seasonal    Allergic rhinitis     Chronic sinusitis     last assessed 10/23/2012    Hyperlipemia     Kidney stone 05/2001    Other tear of medial meniscus, current injury, left knee, initial encounter 10/21/2022     Past Surgical History:   Procedure Laterality Date    CHOLECYSTECTOMY      COLONOSCOPY N/A 01/22/2016    Procedure: COLONOSCOPY;  Surgeon: Elijah Whitfield MD;  Location: [facility];  Service:     CYSTOSCOPY      4/13/22    HERNIA REPAIR      Inguinal    KIDNEY STONE SURGERY      In your records    KNEE SURGERY  1998    Both knees scoped, unsure of dates    LITHOTRIPSY      In your records    ORTHOPEDIC SURGERY Right 1997    Knee    MT ARTHRS KNE SURG  W/MENISCECTOMY MED/LAT W/SHVG Left 11/16/2022    Procedure: ARTHROSCOPY KNEE with partial medial  MENISECTOMY and patellofemoral chondroplasty;  Surgeon: Alvin Herrera MD;  [facility];  Service: Orthopedics    HI CYSTOURETHROSCOPY N/A 04/13/2022    Procedure: CYSTO;  Surgeon: Jairo Sanford MD;  [facility];  Service: Urology    HI LITHOLAPAXY COMP/LG > 2.5 CM N/A 04/13/2022    Procedure: LITHOLOPAXY LASER;  Surgeon: Jairo Sanford MD;  [facility];  Service: Urology    SHOULDER ARTHROSCOPY Right      Family History   Problem Relation Age of Onset    Nephrolithiasis Mother     Hyperlipidemia Mother         Under treatment    Hypertension Mother     Urolithiasis Mother     Coronary artery disease Mother     COPD Mother         Under treatment    Hyperlipidemia Father         Under treatment    Nephrolithiasis Brother     Multiple sclerosis Sister     Coronary artery disease Maternal Grandmother     Autoimmune disease Sister         MS diagnosed in 2018     Social History     Socioeconomic History    Marital status: /Civil Union     Spouse name: None    Number of children: None    Years of education: None    Highest education level: None   Occupational History     Employer: [employer]   Tobacco Use    Smoking status: Never    Smokeless tobacco: Never   Vaping Use    Vaping status: Never Used   Substance and Sexual Activity    Alcohol use: Yes     Alcohol/week: 6.0 standard drinks of alcohol     Types: 6 Cans of beer per week     Comment: social    Drug use: Never    Sexual activity: Yes     Partners: Female     Birth control/protection: None   Other Topics Concern    None   Social History Narrative    None     Social Determinants of Health     Financial Resource Strain: Not on file   Food Insecurity: Not on file   Transportation Needs: Not on file   Physical Activity: Not on file   Stress: Not on file   Social Connections: Not on file   Intimate Partner Violence: Not on  "file   Housing Stability: Not on file     Current Outpatient Medications on File Prior to Visit   Medication Sig    Ascorbic Acid (Vitamin C) 500 MG CHEW Chew 1 Dose daily    aspirin 81 MG tablet Take 81 mg by mouth daily.    atorvastatin (LIPITOR) 80 mg tablet Take 1 tablet (80 mg total) by mouth daily    b complex vitamins capsule Take 1 capsule by mouth daily.    Cholecalciferol (D3-1000) 25 MCG (1000 UT) capsule Take 2 capsules by mouth daily    Cinnamon 500 MG capsule     fluticasone (FLONASE) 50 mcg/act nasal spray 1 spray into each nostril daily    Loratadine 10 MG CAPS Take 1 capsule by mouth daily    Magnesium 500 MG TABS Take 1 tablet by mouth daily    meloxicam (Mobic) 7.5 mg tablet Take 1 tablet (7.5 mg total) by mouth daily    Multiple Vitamin (MULTIVITAMIN) capsule Take 1 capsule by mouth daily.    Omega-3 Fatty Acids (fish oil) 1,000 mg Take 1,000 mg by mouth daily    tadalafil (CIALIS) 5 MG tablet Take 1 tablet (5 mg total) by mouth daily at bedtime    vitamin E, tocopherol, 400 units capsule Take 400 Units by mouth daily.    Zinc 50 MG TABS Take 1 tablet by mouth daily     No Known Allergies  Immunization History   Administered Date(s) Administered    COVID-19 PFIZER VACCINE 0.3 ML IM 03/30/2021, 04/20/2021, 01/08/2022    INFLUENZA 09/13/2018, 09/05/2021, 09/10/2022, 09/17/2023    Influenza Quadrivalent Preservative Free 3 years and older IM 09/17/2015, 09/06/2017    Influenza Quadrivalent, 6-35 Months IM 10/06/2016    Influenza, recombinant, quadrivalent,injectable, preservative free 09/13/2019, 09/08/2020    Influenza, seasonal, injectable 12/10/2012, 10/15/2013, 10/15/2013, 01/01/2014    Tdap 11/06/2014    Zoster Vaccine Recombinant 09/04/2020, 12/02/2020       Objective     /78 (BP Location: Left arm, Patient Position: Sitting, Cuff Size: Standard)   Pulse 78   Temp 97.8 °F (36.6 °C) (Temporal)   Resp 16   Ht 6' 1\" (1.854 m)   Wt 105 kg (231 lb 8 oz)   SpO2 97%   BMI 30.54 kg/m² "     Physical Exam  Vitals and nursing note reviewed.   Constitutional:       General: He is not in acute distress.     Appearance: He is well-developed.   HENT:      Right Ear: Tympanic membrane and ear canal normal.      Left Ear: Tympanic membrane and ear canal normal.      Mouth/Throat:      Mouth: No oral lesions.      Pharynx: Oropharynx is clear.   Eyes:      General: No scleral icterus.     Extraocular Movements: Extraocular movements intact.      Conjunctiva/sclera: Conjunctivae normal.   Neck:      Thyroid: No thyroid mass or thyromegaly.      Vascular: No carotid bruit or JVD.      Trachea: No tracheal deviation.   Cardiovascular:      Rate and Rhythm: Normal rate and regular rhythm.      Pulses:           Carotid pulses are 2+ on the right side and 2+ on the left side.     Heart sounds: Normal heart sounds. No murmur heard.     No gallop.   Pulmonary:      Effort: Pulmonary effort is normal. No respiratory distress.      Breath sounds: Normal breath sounds. No wheezing or rales.   Abdominal:      General: Bowel sounds are normal. There is no distension or abdominal bruit.      Palpations: Abdomen is soft. There is no hepatomegaly, splenomegaly or mass.      Tenderness: There is abdominal tenderness. There is no guarding or rebound.      Hernia: There is no hernia in the left inguinal area or right inguinal area.          Comments: Mild tenderness left lower abdomen in the region of left inguinal herniorrhaphy scar.  No guarding or rebound.  No incisional hernia   Genitourinary:     Penis: Normal.       Testes: Normal.         Right: Mass or tenderness not present.         Left: Mass or tenderness not present.      Epididymis:      Right: Normal.      Left: Normal.   Musculoskeletal:      Right lower leg: No edema.      Left lower leg: No edema.   Lymphadenopathy:      Cervical: No cervical adenopathy.      Upper Body:      Right upper body: No supraclavicular adenopathy.      Left upper body: No  supraclavicular adenopathy.      Lower Body: No right inguinal adenopathy. No left inguinal adenopathy.   Skin:     Findings: No rash.      Nails: There is no clubbing.   Neurological:      General: No focal deficit present.      Mental Status: He is alert and oriented to person, place, and time.   Psychiatric:         Mood and Affect: Mood normal.       Stanislaw Ocampo MD

## 2024-05-28 NOTE — NURSING NOTE
Received notification that patient was allergic to IV contrast, upon calling patient he stated that he does not have any allergies.

## 2024-06-08 ENCOUNTER — HOSPITAL ENCOUNTER (OUTPATIENT)
Dept: RADIOLOGY | Facility: HOSPITAL | Age: 59
Discharge: HOME | End: 2024-06-08
Payer: COMMERCIAL

## 2024-06-08 DIAGNOSIS — R10.32 LLQ ABDOMINAL PAIN: ICD-10-CM

## 2024-06-08 PROCEDURE — 74177 CT ABD & PELVIS W/CONTRAST: CPT

## 2024-06-08 RX ADMIN — IOHEXOL 70 ML: 350 INJECTION, SOLUTION INTRAVENOUS at 10:46

## 2024-06-11 ENCOUNTER — PATIENT MESSAGE (OUTPATIENT)
Dept: FAMILY MEDICINE CLINIC | Facility: CLINIC | Age: 59
End: 2024-06-11

## 2024-06-14 DIAGNOSIS — E78.01 FAMILIAL HYPERCHOLESTEROLEMIA: ICD-10-CM

## 2024-06-14 RX ORDER — ATORVASTATIN CALCIUM 80 MG/1
80 TABLET, FILM COATED ORAL DAILY
Qty: 90 TABLET | Refills: 1 | Status: SHIPPED | OUTPATIENT
Start: 2024-06-14

## 2024-06-19 ENCOUNTER — OFFICE VISIT (OUTPATIENT)
Dept: UROLOGY | Facility: AMBULATORY SURGERY CENTER | Age: 59
End: 2024-06-19
Payer: COMMERCIAL

## 2024-06-19 VITALS
WEIGHT: 230 LBS | SYSTOLIC BLOOD PRESSURE: 140 MMHG | BODY MASS INDEX: 30.34 KG/M2 | DIASTOLIC BLOOD PRESSURE: 90 MMHG | HEART RATE: 83 BPM | OXYGEN SATURATION: 97 %

## 2024-06-19 DIAGNOSIS — N42.9 ABNORMALITY DETECTED ON RECTAL EXAMINATION OF PROSTATE: ICD-10-CM

## 2024-06-19 DIAGNOSIS — N40.1 BENIGN PROSTATIC HYPERPLASIA WITH LOWER URINARY TRACT SYMPTOMS, SYMPTOM DETAILS UNSPECIFIED: Primary | ICD-10-CM

## 2024-06-19 LAB — POST-VOID RESIDUAL VOLUME, ML POC: 165 ML

## 2024-06-19 PROCEDURE — 51798 US URINE CAPACITY MEASURE: CPT

## 2024-06-19 PROCEDURE — 99213 OFFICE O/P EST LOW 20 MIN: CPT

## 2024-06-19 NOTE — PROGRESS NOTES
Office Visit- Urology  Slick Farah 1965 MRN: [id number]      Assessment/Discussion/Plan    58 y.o. male managed by     Prostate cancer screening  -PSA measured 0.81 on 3/28/2024  -EVA today with no overt nodularity but there is increased induration on the left side the prostate   -Prostatic nodularity on recent CT of the abdomen pelvis with contrast on 6/8/2024  -Will obtain a multiparametric MRI of the prostate to further evaluate induration.  Will plan to proceed with MRI fusion transperineal prostate biopsy as if clinically indicated by presence of PI-RADS lesions  -Will call patient with results of multiparametric MRI    2.  Nephrolithiasis/bladder calculus  -S/p cystolitholopaxy in April 2022 at which time 2 large bladder calculi were completely destroyed and extracted.  At that point in time bladder calculi measuring 2 cm  -Patient had a CT of the abdomen and pelvis due to left lower quadrant abdominal pain  -CT demonstrated nonobstructive bilateral nephrolithiasis measuring up to 6 mm.  It was noted that patient had a bladder calculus measuring 6 mm  -Patient defers consideration of intervention at this point in time    3.  BPH with lower tract symptoms  - mL  -On Cialis 5 mg      Chief Complaint:   Slick is a 58 y.o. male presenting to the office for a follow up visit regarding nephrolithiasis/bladder calculus        Subjective    Patient is a 58-year-old male with a history of routine prostate cancer screening and bladder calculus who presents for follow-up.  He has a history of bladder calculus and status post cystolitholopaxy in April 2022.  He recently had a CT of the abdomen pelvis which demonstrated a 6 mm bladder calculus as well as nonobstructive bilateral nephrolithiasis up to 6 mm.  CT of the abdomen pelvis was obtained due to left lower quadrant pain.  Patient denies any suprapubic pain, dysuria or gross hematuria.  He utilizes Cialis 5 mg for urine tract symptoms.  He last PSA  of 0.81 in March 2024      AUA SYMPTOM SCORE      Flowsheet Row Most Recent Value   AUA SYMPTOM SCORE    How often have you had a sensation of not emptying your bladder completely after you finished urinating? 1 (P)     How often have you had to urinate again less than two hours after you finished urinating? 1 (P)     How often have you found you stopped and started again several times when you urinate? 0 (P)     How often have you found it difficult to postpone urination? 1 (P)     How often have you had a weak urinary stream? 1 (P)     How often have you had to push or strain to begin urination? 1 (P)     How many times did you most typically get up to urinate from the time you went to bed at night until the time you got up in the morning? 0 (P)     Quality of Life: If you were to spend the rest of your life with your urinary condition just the way it is now, how would you feel about that? 1 (P)     AUA SYMPTOM SCORE 5 (P)              ROS:   Review of Systems   Constitutional: Negative.  Negative for chills, fatigue and fever.   HENT: Negative.     Respiratory:  Negative for shortness of breath.    Cardiovascular:  Negative for chest pain.   Gastrointestinal: Negative.  Negative for abdominal pain.   Endocrine: Negative.    Musculoskeletal: Negative.    Skin: Negative.    Neurological: Negative.  Negative for dizziness and light-headedness.   Hematological: Negative.    Psychiatric/Behavioral: Negative.           Past Medical History  Past Medical History:   Diagnosis Date    Acute sinusitis     Allergic 1999    Seasonal    Allergic rhinitis     Chronic sinusitis     last assessed 10/23/2012    Hyperlipemia     Kidney stone 05/2001    Other tear of medial meniscus, current injury, left knee, initial encounter 10/21/2022       Past Surgical History  Past Surgical History:   Procedure Laterality Date    CHOLECYSTECTOMY      COLONOSCOPY N/A 01/22/2016    Procedure: COLONOSCOPY;  Surgeon: Elijah Whitfield MD;   Location: [facility];  Service:     CYSTOSCOPY      4/13/22    HERNIA REPAIR      Inguinal    KIDNEY STONE SURGERY      In your records    KNEE SURGERY  1998    Both knees scoped, unsure of dates    LITHOTRIPSY      In your records    ORTHOPEDIC SURGERY Right 1997    Knee    IL ARTHRS KNE SURG W/MENISCECTOMY MED/LAT W/SHVG Left 11/16/2022    Procedure: ARTHROSCOPY KNEE with partial medial  MENISECTOMY and patellofemoral chondroplasty;  Surgeon: Alvin Herrera MD;  [facility];  Service: Orthopedics    IL CYSTOURETHROSCOPY N/A 04/13/2022    Procedure: CYSTO;  Surgeon: Jairo Sanford MD;  [facility];  Service: Urology    IL LITHOLAPAXY COMP/LG > 2.5 CM N/A 04/13/2022    Procedure: LITHOLOPAXY LASER;  Surgeon: Jairo Sanford MD;  [facility];  Service: Urology    SHOULDER ARTHROSCOPY Right        Past Family History  Family History   Problem Relation Age of Onset    Nephrolithiasis Mother     Hyperlipidemia Mother         Under treatment    Hypertension Mother     Urolithiasis Mother     Coronary artery disease Mother     COPD Mother         Under treatment    Hyperlipidemia Father         Under treatment    Nephrolithiasis Brother     Multiple sclerosis Sister     Coronary artery disease Maternal Grandmother     Autoimmune disease Sister         MS diagnosed in 2018       Past Social history  Social History     Socioeconomic History    Marital status: /Civil Union     Spouse name: Not on file    Number of children: Not on file    Years of education: Not on file    Highest education level: Not on file   Occupational History     Employer: [employer]   Tobacco Use    Smoking status: Never    Smokeless tobacco: Never   Vaping Use    Vaping status: Never Used   Substance and Sexual Activity    Alcohol use: Yes     Alcohol/week: 6.0 standard drinks of alcohol     Types: 6 Cans of beer per week     Comment: social    Drug use: Never    Sexual activity: Yes     Partners: Female      Birth control/protection: None   Other Topics Concern    Not on file   Social History Narrative    Not on file     Social Determinants of Health     Financial Resource Strain: Not on file   Food Insecurity: Not on file   Transportation Needs: Not on file   Physical Activity: Not on file   Stress: Not on file   Social Connections: Not on file   Intimate Partner Violence: Not on file   Housing Stability: Not on file       Current Medications  Current Outpatient Medications   Medication Sig Dispense Refill    Ascorbic Acid (Vitamin C) 500 MG CHEW Chew 1 Dose daily      aspirin 81 MG tablet Take 81 mg by mouth daily.      atorvastatin (LIPITOR) 80 mg tablet Take 1 tablet (80 mg total) by mouth daily 90 tablet 1    b complex vitamins capsule Take 1 capsule by mouth daily.      Cholecalciferol (D3-1000) 25 MCG (1000 UT) capsule Take 2 capsules by mouth daily      Cinnamon 500 MG capsule       Loratadine 10 MG CAPS Take 1 capsule by mouth daily      Magnesium 500 MG TABS Take 1 tablet by mouth daily      meloxicam (Mobic) 7.5 mg tablet Take 1 tablet (7.5 mg total) by mouth daily (Patient taking differently: Take 7.5 mg by mouth if needed) 30 tablet 1    Multiple Vitamin (MULTIVITAMIN) capsule Take 1 capsule by mouth daily.      Omega-3 Fatty Acids (fish oil) 1,000 mg Take 1,000 mg by mouth daily      tadalafil (CIALIS) 5 MG tablet Take 1 tablet (5 mg total) by mouth daily at bedtime 90 tablet 3    vitamin E, tocopherol, 400 units capsule Take 400 Units by mouth daily.      Zinc 50 MG TABS Take 1 tablet by mouth daily      fluticasone (FLONASE) 50 mcg/act nasal spray 1 spray into each nostril daily (Patient not taking: Reported on 6/19/2024) 15.8 mL 1     No current facility-administered medications for this visit.       Allergies  No Known Allergies    OBJECTIVE    Vitals   Vitals:    06/19/24 0923   BP: 140/90   BP Location: Left arm   Patient Position: Sitting   Cuff Size: Adult   Pulse: 83   SpO2: 97%   Weight: 104 kg  (230 lb)       PVR:    Physical Exam  Constitutional:       General: He is not in acute distress.     Appearance: Normal appearance. He is normal weight. He is not ill-appearing or toxic-appearing.   HENT:      Head: Normocephalic and atraumatic.   Eyes:      Conjunctiva/sclera: Conjunctivae normal.   Cardiovascular:      Rate and Rhythm: Normal rate.   Pulmonary:      Effort: Pulmonary effort is normal. No respiratory distress.   Genitourinary:     Comments: No overt nodularity appreciated on prostate exam but there is increased induration on the left side when compared to right.  Skin:     General: Skin is warm and dry.   Neurological:      General: No focal deficit present.      Mental Status: He is alert and oriented to person, place, and time.      Cranial Nerves: No cranial nerve deficit.   Psychiatric:         Mood and Affect: Mood normal.         Behavior: Behavior normal.         Thought Content: Thought content normal.          Labs:      Lab Results   Component Value Date    PSA 0.81 03/28/2024    PSA 0.5 04/08/2023    PSA 0.6 02/05/2022    PSA 0.4 03/13/2021     Lab Results   Component Value Date    CREATININE 0.92 03/28/2024      Lab Results   Component Value Date    HGBA1C 5.5 03/28/2024     Lab Results   Component Value Date    GLUCOSE 94 11/20/2015    CALCIUM 9.5 03/28/2024     11/20/2015    K 4.9 03/28/2024    CO2 26 03/28/2024     03/28/2024    BUN 24 03/28/2024    CREATININE 0.92 03/28/2024       I have personally reviewed all pertinent lab results and reviewed with patient    Imaging       Jose López PA-C  Date: 6/19/2024 Time: 9:28 AM  [facility]    This note was written using fluency dictation software. Please excuse any resulting minor grammatical errors.

## 2024-07-06 ENCOUNTER — APPOINTMENT (OUTPATIENT)
Dept: LAB | Facility: MEDICAL CENTER | Age: 59
End: 2024-07-06
Payer: COMMERCIAL

## 2024-07-06 DIAGNOSIS — N42.9 ABNORMALITY DETECTED ON RECTAL EXAMINATION OF PROSTATE: ICD-10-CM

## 2024-07-06 LAB
ANION GAP SERPL CALCULATED.3IONS-SCNC: 9 MMOL/L (ref 4–13)
BUN SERPL-MCNC: 30 MG/DL (ref 5–25)
CALCIUM SERPL-MCNC: 9.5 MG/DL (ref 8.4–10.2)
CHLORIDE SERPL-SCNC: 104 MMOL/L (ref 96–108)
CO2 SERPL-SCNC: 27 MMOL/L (ref 21–32)
CREAT SERPL-MCNC: 0.88 MG/DL (ref 0.6–1.3)
GFR SERPL CREATININE-BSD FRML MDRD: 93 ML/MIN/1.73SQ M
GLUCOSE P FAST SERPL-MCNC: 96 MG/DL (ref 65–99)
POTASSIUM SERPL-SCNC: 4.6 MMOL/L (ref 3.5–5.3)
SODIUM SERPL-SCNC: 140 MMOL/L (ref 135–147)

## 2024-07-06 PROCEDURE — 80048 BASIC METABOLIC PNL TOTAL CA: CPT

## 2024-07-06 PROCEDURE — 36415 COLL VENOUS BLD VENIPUNCTURE: CPT

## 2024-07-29 ENCOUNTER — HOSPITAL ENCOUNTER (OUTPATIENT)
Dept: RADIOLOGY | Age: 59
Discharge: HOME | End: 2024-07-29
Payer: COMMERCIAL

## 2024-07-29 DIAGNOSIS — N42.9 ABNORMALITY DETECTED ON RECTAL EXAMINATION OF PROSTATE: ICD-10-CM

## 2024-07-29 PROCEDURE — A9585 GADOBUTROL INJECTION: HCPCS

## 2024-07-29 PROCEDURE — 72197 MRI PELVIS W/O & W/DYE: CPT

## 2024-07-29 PROCEDURE — 76377 3D RENDER W/INTRP POSTPROCES: CPT

## 2024-07-29 RX ORDER — GADOBUTROL 604.72 MG/ML
10 INJECTION INTRAVENOUS
Status: COMPLETED | OUTPATIENT
Start: 2024-07-29 | End: 2024-07-29

## 2024-07-29 RX ADMIN — GADOBUTROL 10 ML: 604.72 INJECTION INTRAVENOUS at 09:35

## 2024-08-01 NOTE — PROGRESS NOTES
Ambulatory Visit  Name: Slick Farah      : 1965      MRN: [id number]  Encounter Provider: Sarah Moore MD  Encounter Date: 2024   Encounter department: [facility] COLON AND RECTAL SURGERY [place]    Assessment & Plan   1. Rectal bleeding  Patient has small internal hemorrhoids on exam today without active bleeding  Given history of diverticulosis and polyps, I recommended repeat colonoscopy for further evaluation  Patient has mild left lower quadrant pain that has not required treatment with antibiotics; constant nature and negative imaging makes diverticulitis less likely  I have advised the patient to call the office should he experience worsening and constant pain, at which point we may order antibiotics and a CT scan  Recommended high-fiber diet and fiber supplementation to prevent diverticular and hemorrhoidal symptoms  He will follow-up after colonoscopy    History of Present Illness       Slick Farah is a 59 y.o. male who presents for lower abdominal tenderness, abdominal swelling, and rectal bleeding.      The patient reports that he has been experiencing constant left lower quadrant abdominal tenderness for the past 6 months to a year. He states that the discomfort is always there but sometimes it intensifies. He notes that he also does have left lower quadrant abdominal swelling.     He notes that he experiences bloating, reflux, and heartburn but has never seen GI.     He has a 2-3 daily bowel movements. The consistency of the stool varies.     He has been experiencing intermittent rectal bleeding for the past 6 months to a year. The frequency of these episodes of bleeding varies. Last week he had bleeding with almost every bowel movement. Before that he had not had any bleeding for a month.     Last colonoscopy was performed on 2021 by Dr. Whitfield with a 5 year recall.    Lab Results   Component Value Date    WBC 8.02 2023    HGB 15.4 2023     HCT 46.2 11/29/2023    MCV 90 11/29/2023     11/29/2023     Lab Results   Component Value Date    SODIUM 140 07/06/2024    K 4.6 07/06/2024     07/06/2024    CO2 27 07/06/2024    AGAP 9 07/06/2024    BUN 30 (H) 07/06/2024    CREATININE 0.88 07/06/2024    GLUF 96 07/06/2024    CALCIUM 9.5 07/06/2024    AST 20 03/28/2024    ALT 33 03/28/2024    ALKPHOS 72 03/28/2024    TP 6.9 03/28/2024    TBILI 0.78 03/28/2024    EGFR 93 07/06/2024     Review of Systems   Constitutional:  Negative for chills and fever.   HENT:  Negative for ear pain and sore throat.    Eyes:  Negative for pain and visual disturbance.   Respiratory:  Negative for cough and shortness of breath.    Cardiovascular:  Negative for chest pain and palpitations.   Gastrointestinal:  Positive for abdominal pain and anal bleeding. Negative for vomiting.   Genitourinary:  Negative for dysuria and hematuria.   Musculoskeletal:  Negative for arthralgias and back pain.   Skin:  Negative for color change and rash.   Neurological:  Negative for seizures and syncope.   All other systems reviewed and are negative.    Past Medical History   Past Medical History:   Diagnosis Date    Acute sinusitis     Allergic 1999    Seasonal    Allergic rhinitis     Chronic sinusitis     last assessed 10/23/2012    Hyperlipemia     Kidney stone 05/2001    Other tear of medial meniscus, current injury, left knee, initial encounter 10/21/2022     Past Surgical History:   Procedure Laterality Date    CHOLECYSTECTOMY      COLONOSCOPY N/A 01/22/2016    Procedure: COLONOSCOPY;  Surgeon: Elijah Whitfield MD;  Location: [facility];  Service:     CYSTOSCOPY      4/13/22    HERNIA REPAIR      Inguinal    KIDNEY STONE SURGERY      In your records    KNEE SURGERY  1998    Both knees scoped, unsure of dates    LITHOTRIPSY      In your records    ORTHOPEDIC SURGERY Right 1997    Knee    DE ARTHRS KNE SURG W/MENISCECTOMY MED/LAT W/SHVG Left 11/16/2022    Procedure: ARTHROSCOPY KNEE  with partial medial  MENISECTOMY and patellofemoral chondroplasty;  Surgeon: Alvin Herrera MD;  [facility];  Service: Orthopedics    VA CYSTOURETHROSCOPY N/A 04/13/2022    Procedure: CYSTO;  Surgeon: Jairo Sanford MD;  [facility];  Service: Urology    VA LITHOLAPAXY COMP/LG > 2.5 CM N/A 04/13/2022    Procedure: LITHOLOPAXY LASER;  Surgeon: Jairo Sanford MD;  [facility];  Service: Urology    SHOULDER ARTHROSCOPY Right      Family History   Problem Relation Age of Onset    Nephrolithiasis Mother     Hyperlipidemia Mother         Under treatment    Hypertension Mother     Urolithiasis Mother     Coronary artery disease Mother     COPD Mother         Under treatment    Hyperlipidemia Father         Under treatment    Nephrolithiasis Brother     Multiple sclerosis Sister     Coronary artery disease Maternal Grandmother     Autoimmune disease Sister         MS diagnosed in 2018     Current Outpatient Medications on File Prior to Visit   Medication Sig Dispense Refill    Ascorbic Acid (Vitamin C) 500 MG CHEW Chew 1 Dose daily      aspirin 81 MG tablet Take 81 mg by mouth daily.      atorvastatin (LIPITOR) 80 mg tablet Take 1 tablet (80 mg total) by mouth daily 90 tablet 1    b complex vitamins capsule Take 1 capsule by mouth daily.      Cholecalciferol (D3-1000) 25 MCG (1000 UT) capsule Take 2 capsules by mouth daily      Cinnamon 500 MG capsule       fluticasone (FLONASE) 50 mcg/act nasal spray 1 spray into each nostril daily (Patient taking differently: 1 spray into each nostril as needed) 15.8 mL 1    Loratadine 10 MG CAPS Take 1 capsule by mouth daily      Magnesium 500 MG TABS Take 1 tablet by mouth daily      Multiple Vitamin (MULTIVITAMIN) capsule Take 1 capsule by mouth daily.      Omega-3 Fatty Acids (fish oil) 1,000 mg Take 1,000 mg by mouth daily      tadalafil (CIALIS) 5 MG tablet Take 1 tablet (5 mg total) by mouth daily at bedtime 90 tablet 3    vitamin E,  "tocopherol, 400 units capsule Take 400 Units by mouth daily.      Zinc 50 MG TABS Take 1 tablet by mouth daily      meloxicam (Mobic) 7.5 mg tablet Take 1 tablet (7.5 mg total) by mouth daily (Patient taking differently: Take 7.5 mg by mouth if needed) 30 tablet 1     No current facility-administered medications on file prior to visit.   No Known Allergies   Objective     /86   Ht 6' 1\" (1.854 m)   Wt 104 kg (230 lb)   BMI 30.34 kg/m²     Physical Exam  Vitals and nursing note reviewed.   Constitutional:       General: He is not in acute distress.     Appearance: He is well-developed.   HENT:      Head: Normocephalic and atraumatic.   Eyes:      Conjunctiva/sclera: Conjunctivae normal.   Cardiovascular:      Rate and Rhythm: Normal rate and regular rhythm.      Heart sounds: No murmur heard.  Pulmonary:      Effort: Pulmonary effort is normal. No respiratory distress.      Breath sounds: Normal breath sounds.   Abdominal:      Palpations: Abdomen is soft.      Tenderness: There is no abdominal tenderness.   Genitourinary:     Comments: Small posterior midline skin tag  Strong tone on digital anorectal exam without anterior or posterior midline tenderness  Anoscopy showing small nonbleeding internal hemorrhoids  Musculoskeletal:         General: No swelling.      Cervical back: Neck supple.   Skin:     General: Skin is warm and dry.      Capillary Refill: Capillary refill takes less than 2 seconds.   Neurological:      Mental Status: He is alert.   Psychiatric:         Mood and Affect: Mood normal.     Lower Endoscopy    Date/Time: 8/2/2024 9:20 AM    Performed by: Sarah Moore MD  Authorized by: Sarah Moore MD    Verbal consent obtained?: Yes    Risks and benefits: Risks, benefits and alternatives were discussed    Consent given by:  Patient  Patient identity confirmed:  Verbally with patient and provided demographic data  Time out: Immediately prior to the procedure a time out " was called    Patient sedated: No    Scope type:  Anoscope  External exam performed: Yes    Digital exam performed: Yes    Internal hemorrhoids: Yes        Administrative Statements   I have spent a total time of 36 minutes in caring for this patient on the day of the visit/encounter including Diagnostic results, Prognosis, Risks and benefits of tx options, Instructions for management, Patient and family education, Importance of tx compliance, Risk factor reductions, Impressions, Counseling / Coordination of care, Documenting in the medical record, Reviewing / ordering tests, medicine, procedures  , Obtaining or reviewing history  , and Communicating with other healthcare professionals .

## 2024-08-02 ENCOUNTER — PREP FOR PROCEDURE (OUTPATIENT)
Age: 59
End: 2024-08-02

## 2024-08-02 ENCOUNTER — OFFICE VISIT (OUTPATIENT)
Age: 59
End: 2024-08-02
Payer: COMMERCIAL

## 2024-08-02 ENCOUNTER — TELEPHONE (OUTPATIENT)
Age: 59
End: 2024-08-02

## 2024-08-02 VITALS
HEIGHT: 73 IN | SYSTOLIC BLOOD PRESSURE: 122 MMHG | WEIGHT: 230 LBS | BODY MASS INDEX: 30.48 KG/M2 | DIASTOLIC BLOOD PRESSURE: 86 MMHG

## 2024-08-02 DIAGNOSIS — R10.9 ABDOMINAL PAIN, UNSPECIFIED ABDOMINAL LOCATION: Primary | ICD-10-CM

## 2024-08-02 DIAGNOSIS — K62.5 RECTAL BLEEDING: Primary | ICD-10-CM

## 2024-08-02 PROCEDURE — 99212 OFFICE O/P EST SF 10 MIN: CPT | Performed by: SURGERY

## 2024-08-02 PROCEDURE — 46600 DIAGNOSTIC ANOSCOPY SPX: CPT | Performed by: SURGERY

## 2024-08-02 NOTE — PATIENT INSTRUCTIONS
High fiber diet/increased hydration, 20-30 grams fiber per day, increased fruits/vegetables    Start fiber supplementation with benefiber. You may put this in your coffee/tea/juice in the morning. Start with 2 tsp once per day. If you experience bloating or gassiness, you may start with 1 tsp once per day. You may increase fiber supplementation to UP TO 2 tsp three times per day in order to achieve 1 formed bowel movement once per day.    Aim to drink at least 64 oz of water per day      High Fiber Diet   AMBULATORY CARE:   A high-fiber diet  includes foods that have a high amount of fiber. Fiber is the part of fruits, vegetables, and grains that is not broken down by your body. Fiber keeps your bowel movements regular. Fiber can also help lower your cholesterol level, control blood sugar in people with diabetes, and relieve constipation. Fiber can also help you control your weight because it helps you feel full faster. Most adults should eat 25 to 35 grams of fiber each day. Talk to your dietitian or healthcare provider about the amount of fiber you need.  Good sources of fiber:       Foods with at least 4 grams of fiber per serving:      ? to ½ cup of high-fiber cereal (check the nutrition label on the box)    ½ cup of blackberries or raspberries    4 dried prunes    1 cooked artichoke    ½ cup of cooked legumes, such as lentils, or red, kidney, and moreno beans    Foods with 1 to 3 grams of fiber per servin slice of whole-wheat, pumpernickel, or rye bread    ½ cup of cooked brown rice    4 whole-wheat crackers    1 cup of oatmeal    ½ cup of cereal with 1 to 3 grams of fiber per serving (check the nutrition label on the box)    1 small piece of fruit, such as an apple, banana, pear, kiwi, or orange    3 dates    ½ cup of canned apricots, fruit cocktail, peaches, or pears    ½ cup of raw or cooked vegetables, such as carrots, cauliflower, cabbage, spinach, squash, or corn  Ways that you can increase fiber  in your diet:   Choose brown or wild rice instead of white rice.     Use whole wheat flour in recipes instead of white or all-purpose flour.     Add beans and peas to casseroles or soups.     Choose fresh fruit and vegetables with peels or skins on instead of juices.    Other diet guidelines to follow:   Add fiber to your diet slowly.  You may have abdominal discomfort, bloating, and gas if you add fiber to your diet too quickly.     Drink plenty of liquids as you add fiber to your diet.  You may have nausea or develop constipation if you do not drink enough water. Ask how much liquid to drink each day and which liquids are best for you.    © Copyright [employer] 2023 Information is for End User's use only and may not be sold, redistributed or otherwise used for commercial purposes.  The above information is an  only. It is not intended as medical advice for individual conditions or treatments. Talk to your doctor, nurse or pharmacist before following any medical regimen to see if it is safe and effective for you.

## 2024-08-02 NOTE — TELEPHONE ENCOUNTER
OV JT 8/2/2024, abd pain, last colon 6/2021 DE, BMI 30    Scheduled 10/11/2024 at [facility] w/JT    Paperwork handed to pt

## 2024-08-02 NOTE — LETTER
Slick Farah  [address] [facility] [id number]        ------------------------------------------------------------------------------------------------------------  Location:  [facility] [place] - [address] [place] [id number] - [address]wangmartin [facility] - Entrance A - 1st Floor    Performing Physician: Sarah Moore MD      DATE OF PROCEDURE: 10/11/2024 TIME OF PROCEDURE:                                 The OR/GI Lab will contact you the evening prior to your procedure with your exact arrival time.    We kindly ask that you immediately notify us of any need to cancel or reschedule your procedure.      WEEK BEFORE THE PROCEDURE:  Do not take Iron tablets for one week  5 days prior to the appointment AVOID vegetables and fruits with skins or seeds, nuts, corn, popcorn, and whole grain breads.  Purchase: One (1) 238-gram container of Miralax (polyethylene glycol 3350), four (4) 5 mg Dulcolax (bisacodyl) tablets, and 64-ounces of Gatorade (sports drink) - no red, orange, or purple. These may be purchased at any pharmacy without a prescription. Generic products are permissible.  Arrange responsible transportation for day of the procedure.      DAY BEFORE THE PROCEDURE:  CLEAR liquids only for entire day prior. Nothing red, orange or purple.  You MAY have:  Soda  Water  Broth Gatorade  Jell-O  Popsicles Coffee/tea without  Milk/creamer     YOU MAY NOT HAVE:  Solid foods  Milk and milk products  Juice with pulp    BOWEL PREPARATION: Includes: One (1) 238-gram container of Miralax (polyethylene glycol 3350), four (4) 5 mg Dulcolax (bisacodyl) tablets, and 64-ounces of Gatorade (sports drink). Preparation may be refrigerated. Entire bowel prep should be completed.    Afternoon before the procedure (2:00 pm - 5:00 pm):  Take two (2) 5 mg Dulcolax laxative tablets.    Evening before the procedure (6:00 pm):  Mix entire container of Miralax with 64-ounces of Gatorade and shake until all medication is dissolved.  Begin  drinking solution. Drink an eight (8) ounce cup every 10-15 minutes until you have consumed half (32 ounces) of the solution. Refrigerate remaining solution.    Night before the procedure (8:00 pm):  Take two (2) 5 mg Dulcolax laxative tablets.    Beginning 5 hours before your procedure:  Drink the remaining amount of prepared solution (32 ounces). Drink an eight (8) ounce cup every 10-15 minutes until you have consumed the remaining solution.      Bowel prep should be completed 4 hours prior to procedure time.  NOTHING TO EAT OR DRINK AFTER MIDNIGHT -EXCEPT YOUR BOWEL PREP                                                                                                                                                                                DAY OF THE PROCEDURE:  You may brush your teeth.  Leave all jewelry at home. Take out any facial piercings, if able.  Please arrive for your procedure as indicated by the OR / GI Lab / Endoscopy Unit. The hospital will contact you the day before with your exact arrival time.  Make sure you have arranged ahead of time for a responsible adult (18 or older) to accompany and drive you home after the procedure. Please discuss any transportation concerns with our staff prior to your procedure.  The effects of the anesthesia can persist for 24 hours. After receiving the sedation, you must exercise caution before engaging in any activity that could harm yourself and others (such as driving a car). Do not make any important decisions or do not drink any alcoholic beverages during this time period.  After your procedure, you may have anything you’d like to eat or drink. You will probably want to start with something light. Please include plenty of fluids. Avoid items that cause gas such as sodas and salads.      SPECIAL INSTRUCTIONS:    For patients currently taking blood thinners and/or antiplatelet therapy our office will contact the prescribing provider. Our office will contact you  with any required changes to your medication regimen.  Blood thinner (i.e. - Coumadin, Pradaxa, Lovenox, Xarelto, Eliquis)  N/A  Antiplatelet (i.e. - Plavix, Aggrenox, Effient, Brilinta)  N/A    Diabetes:  If you are Diabetic, please see separate Diabetic Instruction Sheet.  Prescribed medications:  Do not stop your aspirin, or any of your other medications (unless instructed otherwise).  Take the rest of your prescribed medications with small sips of water at least 2 hours prior to your procedure.      NOTHING TO EAT OR DRINK (INCLUDING CHEWING GUM) AFTER 12 MIDNIGHT PRIOR TO YOUR PROCEDURE EXCEPT YOUR BOWEL PREP.        For any questions or concerns related to your bowel preparation or pre-procedure instructions, please contact our office.  Thank you for choosing [facility]’s Colon & Rectal Surgery!    Revised 1/2023    [phone]

## 2024-08-16 DIAGNOSIS — Z00.6 ENCOUNTER FOR EXAMINATION FOR NORMAL COMPARISON OR CONTROL IN CLINICAL RESEARCH PROGRAM: ICD-10-CM

## 2024-08-19 ENCOUNTER — APPOINTMENT (OUTPATIENT)
Dept: LAB | Facility: MEDICAL CENTER | Age: 59
End: 2024-08-19

## 2024-08-19 DIAGNOSIS — Z00.6 ENCOUNTER FOR EXAMINATION FOR NORMAL COMPARISON OR CONTROL IN CLINICAL RESEARCH PROGRAM: ICD-10-CM

## 2024-08-19 PROCEDURE — 36415 COLL VENOUS BLD VENIPUNCTURE: CPT

## 2024-09-03 LAB
APOB+LDLR+PCSK9 GENE MUT ANL BLD/T: NOT DETECTED
BRCA1+BRCA2 DEL+DUP + FULL MUT ANL BLD/T: NOT DETECTED
MLH1+MSH2+MSH6+PMS2 GN DEL+DUP+FUL M: NOT DETECTED

## 2024-10-10 RX ORDER — SODIUM CHLORIDE, SODIUM LACTATE, POTASSIUM CHLORIDE, CALCIUM CHLORIDE 600; 310; 30; 20 MG/100ML; MG/100ML; MG/100ML; MG/100ML
125 INJECTION, SOLUTION INTRAVENOUS CONTINUOUS
Status: CANCELLED | OUTPATIENT
Start: 2024-10-10

## 2024-10-10 RX ORDER — LIDOCAINE HYDROCHLORIDE 10 MG/ML
0.5 INJECTION, SOLUTION EPIDURAL; INFILTRATION; INTRACAUDAL; PERINEURAL ONCE AS NEEDED
Status: CANCELLED | OUTPATIENT
Start: 2024-10-10

## 2024-10-11 ENCOUNTER — ANESTHESIA EVENT (OUTPATIENT)
Dept: GASTROENTEROLOGY | Facility: HOSPITAL | Age: 59
End: 2024-10-11
Payer: COMMERCIAL

## 2024-10-11 ENCOUNTER — ANESTHESIA (OUTPATIENT)
Dept: GASTROENTEROLOGY | Facility: HOSPITAL | Age: 59
End: 2024-10-11
Payer: COMMERCIAL

## 2024-10-11 ENCOUNTER — HOSPITAL ENCOUNTER (OUTPATIENT)
Dept: GASTROENTEROLOGY | Facility: HOSPITAL | Age: 59
Setting detail: OUTPATIENT SURGERY
Discharge: HOME | End: 2024-10-11
Attending: SURGERY
Payer: COMMERCIAL

## 2024-10-11 VITALS
BODY MASS INDEX: 29.82 KG/M2 | HEIGHT: 73 IN | OXYGEN SATURATION: 98 % | WEIGHT: 225 LBS | TEMPERATURE: 98.2 F | DIASTOLIC BLOOD PRESSURE: 89 MMHG | SYSTOLIC BLOOD PRESSURE: 131 MMHG | RESPIRATION RATE: 20 BRPM | HEART RATE: 70 BPM

## 2024-10-11 DIAGNOSIS — R10.9 ABDOMINAL PAIN, UNSPECIFIED ABDOMINAL LOCATION: ICD-10-CM

## 2024-10-11 PROCEDURE — 88305 TISSUE EXAM BY PATHOLOGIST: CPT | Performed by: SPECIALIST

## 2024-10-11 PROCEDURE — 45385 COLONOSCOPY W/LESION REMOVAL: CPT | Performed by: COLON & RECTAL SURGERY

## 2024-10-11 RX ORDER — PROPOFOL 10 MG/ML
INJECTION, EMULSION INTRAVENOUS AS NEEDED
Status: DISCONTINUED | OUTPATIENT
Start: 2024-10-11 | End: 2024-10-11

## 2024-10-11 RX ORDER — LIDOCAINE HYDROCHLORIDE 10 MG/ML
INJECTION, SOLUTION EPIDURAL; INFILTRATION; INTRACAUDAL; PERINEURAL AS NEEDED
Status: DISCONTINUED | OUTPATIENT
Start: 2024-10-11 | End: 2024-10-11

## 2024-10-11 RX ORDER — PROPOFOL 10 MG/ML
INJECTION, EMULSION INTRAVENOUS CONTINUOUS PRN
Status: DISCONTINUED | OUTPATIENT
Start: 2024-10-11 | End: 2024-10-11

## 2024-10-11 RX ORDER — SODIUM CHLORIDE 9 MG/ML
INJECTION, SOLUTION INTRAVENOUS CONTINUOUS PRN
Status: DISCONTINUED | OUTPATIENT
Start: 2024-10-11 | End: 2024-10-11

## 2024-10-11 RX ADMIN — PROPOFOL 100 MCG/KG/MIN: 10 INJECTION, EMULSION INTRAVENOUS at 13:43

## 2024-10-11 RX ADMIN — LIDOCAINE HYDROCHLORIDE 50 MG: 10 INJECTION, SOLUTION EPIDURAL; INFILTRATION; INTRACAUDAL; PERINEURAL at 13:42

## 2024-10-11 RX ADMIN — SODIUM CHLORIDE: 0.9 INJECTION, SOLUTION INTRAVENOUS at 13:30

## 2024-10-11 RX ADMIN — PROPOFOL 80 MG: 10 INJECTION, EMULSION INTRAVENOUS at 13:43

## 2024-10-11 NOTE — H&P
History and Physical   Colon and Rectal Surgery   Slick Farah 59 y.o. male MRN: [id number]  Unit/Bed#:  Encounter: [id number]  10/11/24   @NOW    No chief complaint on file.        History of Present Illness   HPI:  Slick Farah is a 59 y.o. male who presents for evaluation of rectal bleeding.      Historical Information   Past Medical History:   Diagnosis Date    Acute sinusitis     Allergic 1999    Seasonal    Allergic rhinitis     Chronic sinusitis     last assessed 10/23/2012    Hyperlipemia     Kidney stone 05/2001    Other tear of medial meniscus, current injury, left knee, initial encounter 10/21/2022     Past Surgical History:   Procedure Laterality Date    CHOLECYSTECTOMY      COLONOSCOPY N/A 01/22/2016    Procedure: COLONOSCOPY;  Surgeon: Elijah Whitfield MD;  Location: [facility];  Service:     CYSTOSCOPY      4/13/22    HERNIA REPAIR      Inguinal    KIDNEY STONE SURGERY      In your records    KNEE SURGERY  1998    Both knees scoped, unsure of dates    LITHOTRIPSY      In your records    ORTHOPEDIC SURGERY Right 1997    Knee    TX ARTHRS KNE SURG W/MENISCECTOMY MED/LAT W/SHVG Left 11/16/2022    Procedure: ARTHROSCOPY KNEE with partial medial  MENISECTOMY and patellofemoral chondroplasty;  Surgeon: Alvin Herrera MD;  [facility];  Service: Orthopedics    TX CYSTOURETHROSCOPY N/A 04/13/2022    Procedure: CYSTO;  Surgeon: Jairo Sanford MD;  [facility];  Service: Urology    TX LITHOLAPAXY COMP/LG > 2.5 CM N/A 04/13/2022    Procedure: LITHOLOPAXY LASER;  Surgeon: Jairo Sanford MD;  [facility];  Service: Urology    SHOULDER ARTHROSCOPY Right        Meds/Allergies     Not in a hospital admission.      Current Outpatient Medications:     Ascorbic Acid (Vitamin C) 500 MG CHEW, Chew 1 Dose daily, Disp: , Rfl:     aspirin 81 MG tablet, Take 81 mg by mouth daily., Disp: , Rfl:     atorvastatin (LIPITOR) 80 mg tablet, Take 1 tablet (80 mg total) by mouth  daily, Disp: 90 tablet, Rfl: 1    b complex vitamins capsule, Take 1 capsule by mouth daily., Disp: , Rfl:     Cholecalciferol (D3-1000) 25 MCG (1000 UT) capsule, Take 2 capsules by mouth daily, Disp: , Rfl:     Cinnamon 500 MG capsule, , Disp: , Rfl:     fluticasone (FLONASE) 50 mcg/act nasal spray, 1 spray into each nostril daily (Patient taking differently: 1 spray into each nostril as needed), Disp: 15.8 mL, Rfl: 1    Loratadine 10 MG CAPS, Take 1 capsule by mouth daily, Disp: , Rfl:     Magnesium 500 MG TABS, Take 1 tablet by mouth daily, Disp: , Rfl:     meloxicam (Mobic) 7.5 mg tablet, Take 1 tablet (7.5 mg total) by mouth daily (Patient taking differently: Take 7.5 mg by mouth if needed), Disp: 30 tablet, Rfl: 1    Multiple Vitamin (MULTIVITAMIN) capsule, Take 1 capsule by mouth daily., Disp: , Rfl:     Omega-3 Fatty Acids (fish oil) 1,000 mg, Take 1,000 mg by mouth daily, Disp: , Rfl:     tadalafil (CIALIS) 5 MG tablet, Take 1 tablet (5 mg total) by mouth daily at bedtime, Disp: 90 tablet, Rfl: 3    vitamin E, tocopherol, 400 units capsule, Take 400 Units by mouth daily., Disp: , Rfl:     Zinc 50 MG TABS, Take 1 tablet by mouth daily, Disp: , Rfl:     No Known Allergies      Social History   Social History     Substance and Sexual Activity   Alcohol Use Yes    Alcohol/week: 6.0 standard drinks of alcohol    Types: 6 Cans of beer per week    Comment: social     Social History     Substance and Sexual Activity   Drug Use Never     Social History     Tobacco Use   Smoking Status Never   Smokeless Tobacco Never         Family History:   Family History   Problem Relation Age of Onset    Nephrolithiasis Mother     Hyperlipidemia Mother         Under treatment    Hypertension Mother     Urolithiasis Mother     Coronary artery disease Mother     COPD Mother         Under treatment    Hyperlipidemia Father         Under treatment    Nephrolithiasis Brother     Multiple sclerosis Sister     Coronary artery disease  Maternal Grandmother     Autoimmune disease Sister         MS diagnosed in 2018         Objective     Current Vitals:      No intake or output data in the 24 hours ending 10/11/24 1241    Physical Exam:  General:  Resting comfortably in bed   Eyes:Sclera anicteric  ENT: Trachea midline  Pulm:  Symmetric chest raise.  No respiratory Distress  CV:  Regular on monitor  Abdomen:  Soft NT ND  Extremities:  No clubbing/ cyanosis/ edema    Lab Results: I have personally reviewed pertinent lab results.    Imaging: Results Review Statement: No pertinent imaging studies reviewed.      ASSESSMENT:  Slick Farah is a 59 y.o. male who presents for outpatient colonoscopy.      PLAN:  For colonoscopy    Risks/ Benefits reviewed to include but not limited to anesthesia, bleeding, missed lesions, and colonoscopic perforation requiring surgery.

## 2024-10-11 NOTE — ANESTHESIA PREPROCEDURE EVALUATION
Procedure:  COLONOSCOPY    Relevant Problems   CARDIO   (+) Hyperlipidemia      /RENAL   (+) Complex renal cyst   (+) Kidney stones      MUSCULOSKELETAL   (+) Chronic bilateral low back pain without sciatica   (+) Lumbar degenerative disc disease      NEURO/PSYCH   (+) Chronic bilateral low back pain without sciatica        Physical Exam    Airway    Mallampati score: II  TM Distance: >3 FB  Neck ROM: full     Dental   No notable dental hx     Cardiovascular  Rhythm: regular, Rate: normal, Cardiovascular exam normal    Pulmonary  Pulmonary exam normal Breath sounds clear to auscultation    Other Findings        Anesthesia Plan  ASA Score- 2     Anesthesia Type- IV sedation with anesthesia with ASA Monitors.         Additional Monitors:     Airway Plan:     Comment: Discussed risks/benefits, including medication reactions, awareness, aspiration, and serious/life threatening complications. Plan to maintain native airway with IVGA, monitored with EtCO2.       Plan Factors-Exercise tolerance (METS): >4 METS.    Chart reviewed.    Patient summary reviewed.      Patient instructed to abstain from smoking on day of procedure. Patient did not smoke on day of surgery.            Induction- intravenous.    Postoperative Plan-         Informed Consent- Anesthetic plan and risks discussed with patient.  I personally reviewed this patient with the CRNA. Discussed and agreed on the Anesthesia Plan with the CRNA..

## 2024-10-11 NOTE — ANESTHESIA POSTPROCEDURE EVALUATION
Post-Op Assessment Note    CV Status:  Stable  Pain Score: 0    Pain management: adequate       Mental Status:  Alert and awake   Hydration Status:  Euvolemic   PONV Controlled:  Controlled   Airway Patency:  Patent     Post Op Vitals Reviewed: Yes    No anethesia notable event occurred.    Staff: CRNA           Last Filed PACU Vitals:  Vitals Value Taken Time   Temp 98.2 °F (36.8 °C) 10/11/24 1358   Pulse 74 10/11/24 1358   /73 10/11/24 1358   Resp 20 10/11/24 1358   SpO2 96 % 10/11/24 1358       Modified Rose Mary:  Activity: 2 (10/11/2024  2:00 PM)  Respiration: 2 (10/11/2024  2:00 PM)  Circulation: 2 (10/11/2024  2:00 PM)  Consciousness: 1 (10/11/2024  2:00 PM)  Oxygen Saturation: 2 (10/11/2024  2:00 PM)  Modified Rose Mary Score: 9 (10/11/2024  2:00 PM)

## 2024-10-15 NOTE — ANESTHESIA POSTPROCEDURE EVALUATION
Post-Op Assessment Note    CV Status:  Stable  Pain Score: 0    Pain management: adequate       Mental Status:  Awake and sleepy   Hydration Status:  Stable   PONV Controlled:  None   Airway Patency:  Patent     Post Op Vitals Reviewed: Yes    No anethesia notable event occurred.    Staff: Anesthesiologist           Last Filed PACU Vitals:  Vitals Value Taken Time   Temp 98.2 °F (36.8 °C) 10/11/24 1358   Pulse 70 10/11/24 1413   /89 10/11/24 1413   Resp 20 10/11/24 1413   SpO2 98 % 10/11/24 1413       Modified Rose Mary:  No data recorded

## 2024-10-17 PROCEDURE — 88305 TISSUE EXAM BY PATHOLOGIST: CPT | Performed by: SPECIALIST

## 2024-11-18 DIAGNOSIS — E78.01 FAMILIAL HYPERCHOLESTEROLEMIA: ICD-10-CM

## 2024-11-19 RX ORDER — ATORVASTATIN CALCIUM 80 MG/1
80 TABLET, FILM COATED ORAL DAILY
Qty: 90 TABLET | Refills: 1 | Status: SHIPPED | OUTPATIENT
Start: 2024-11-19

## 2024-12-10 NOTE — PROGRESS NOTES
Assessment/Plan:     Diagnoses and all orders for this visit:    Well adult exam    Mixed hyperlipidemia  -     Lipid panel    Obesity (BMI 30-39  9)    Need for shingles vaccine  -     Zoster Vac Recomb Adjuvanted 50 MCG/0 5ML SUSR; Inject 50 mcg into a muscle once for 1 dose        He is going to try "no burp" fish oil capsules 2 per day  alternatively could add Zetia 10 mg daily to regimen or change to Crestor 40 mg daily  Repeat lipid profile in 6 months  Diet, weight loss exercise  Script for for shingles vaccine  Up-to-date with flu vaccine  BMI Counseling: Body mass index is 30 87 kg/m²  Discussed the patient's BMI with him  The BMI is above average  BMI counseling and education was provided to the patient  Nutrition recommendations include reducing portion sizes, decreasing overall calorie intake, consuming healthier snacks, moderation in carbohydrate intake, reducing intake of saturated fat and trans fat and reducing intake of cholesterol  Exercise recommendations include exercising 3-5 times per week  Patient ID: Tali Maldonado is a 52 y o  male  52 year male here for wellness exam   Active medical problems and past medical history see chart  Medications reviewed  Labs 01/2019 see note  Hyperlipidemia mixed type  He is currently on Atorvastatin 80 mg daily prior treatment with Lopid and Fenofibrate  Mild intolerance to fish oils  Pre treatment cholesterol 400 range  Triglyceride range 600  Lipid profile cholesterol 292 increased from 245  Triglycerides 312 increased from 179  HDL 61    FBS 92  LFTs normal   Nonsmoker  No history of hypertension or type 2 diabetes mellitus  Family history CAD maternal grandmother  + FH hyperlipidemia  no regular exercise           The following portions of the patient's history were reviewed and updated as appropriate: allergies, current medications, past family history, past medical history, past social history, past surgical history and problem list     Review of Systems   Constitutional: Positive for unexpected weight change (7 lb weight gain from 06/2018)  Negative for appetite change, chills and fever  HENT: Negative for congestion, ear pain, rhinorrhea, sinus pressure, sore throat and trouble swallowing  Eyes: Negative for visual disturbance  Wears contacts  Yearly eye exam    Respiratory: Negative for cough, shortness of breath and wheezing  Cardiovascular: Negative for chest pain, palpitations and leg swelling  Up-to-date with colonoscopy  Gastrointestinal: Negative for abdominal pain, blood in stool, constipation, diarrhea, nausea and vomiting  Status post cholecystectomy  Genitourinary: Negative for difficulty urinating  History of kidney stones followed by Urology  Prior surgery and lithotripsy  02/2017 CT scan of abdomen and pelvis bilateral renal cystic lesions  Stable small right hepatic lobe cysts 9 mm  3 mm calculus right mid pole kidney  No hydronephrosis  Left kidney minute calculus fragments visible mid to lower pole  01/2018 PSA 0 5  Status post left inguinal herniorrhaphy  Musculoskeletal: Negative for arthralgias and myalgias  Status post bilateral knee arthroscopy  Right shoulder surgery as a teenager  Skin: Negative for rash  Allergic/Immunologic: Negative for environmental allergies  Neurological: Negative for dizziness, weakness and headaches  Hematological: Negative for adenopathy  Does not bruise/bleed easily  Psychiatric/Behavioral: Negative for dysphoric mood and sleep disturbance  Objective:      /74   Pulse 72   Temp (!) 96 6 °F (35 9 °C)   Resp 16   Ht 6' 1" (1 854 m)   Wt 106 kg (234 lb)   BMI 30 87 kg/m²          Physical Exam   Constitutional: He is oriented to person, place, and time  He appears well-developed and well-nourished  No distress     HENT:   Right Ear: Tympanic membrane normal    Left Ear: Tympanic membrane normal    Mouth/Throat: Oropharynx is clear and moist    Eyes: Pupils are equal, round, and reactive to light  Conjunctivae and EOM are normal  No scleral icterus  Neck: Normal range of motion  Neck supple  No JVD present  Carotid bruit is not present  No tracheal deviation present  No thyroid mass and no thyromegaly present  Cardiovascular: Normal rate, regular rhythm and normal heart sounds  Exam reveals no gallop  No murmur heard  Pulses:       Carotid pulses are 2+ on the right side, and 2+ on the left side  Pulmonary/Chest: Effort normal and breath sounds normal  No respiratory distress  He has no wheezes  He has no rales  Abdominal: Soft  Bowel sounds are normal  He exhibits no distension and no mass  There is no hepatosplenomegaly  There is no tenderness  There is no rebound and no guarding  Musculoskeletal: Normal range of motion  He exhibits no edema  Lymphadenopathy:     He has no cervical adenopathy  Neurological: He is alert and oriented to person, place, and time  No cranial nerve deficit  Skin: No rash noted  Psychiatric: He has a normal mood and affect  Nursing note and vitals reviewed          Recent Results (from the past 336 hour(s))   Comprehensive metabolic panel    Collection Time: 01/04/19  8:00 AM   Result Value Ref Range    Sodium 138 136 - 145 mmol/L    Potassium 4 6 3 5 - 5 3 mmol/L    Chloride 103 100 - 108 mmol/L    CO2 27 21 - 32 mmol/L    ANION GAP 8 4 - 13 mmol/L    BUN 22 5 - 25 mg/dL    Creatinine 0 94 0 60 - 1 30 mg/dL    Glucose, Fasting 92 65 - 99 mg/dL    Calcium 9 5 8 3 - 10 1 mg/dL    AST 25 5 - 45 U/L    ALT 57 12 - 78 U/L    Alkaline Phosphatase 90 46 - 116 U/L    Total Protein 7 5 6 4 - 8 2 g/dL    Albumin 4 1 3 5 - 5 0 g/dL    Total Bilirubin 0 61 0 20 - 1 00 mg/dL    eGFR 91 ml/min/1 73sq m   Lipid panel    Collection Time: 01/04/19  8:00 AM   Result Value Ref Range    Cholesterol 292 (H) 50 - 200 mg/dL    Triglycerides 312 (H) <=150 mg/dL    HDL, Direct 61 (H) 40 - 60 mg/dL    LDL Calculated 169 (H) 0 - 100 mg/dL    Non-HDL-Chol (CHOL-HDL) 231 mg/dl   CBC and differential    Collection Time: 01/04/19  8:00 AM   Result Value Ref Range    WBC 6 91 4 31 - 10 16 Thousand/uL    RBC 5 63 (H) 3 88 - 5 62 Million/uL    Hemoglobin 16 9 12 0 - 17 0 g/dL    Hematocrit 51 3 (H) 36 5 - 49 3 %    MCV 91 82 - 98 fL    MCH 30 0 26 8 - 34 3 pg    MCHC 32 9 31 4 - 37 4 g/dL    RDW 13 3 11 6 - 15 1 %    MPV 9 9 8 9 - 12 7 fL    Platelets 001 179 - 142 Thousands/uL    nRBC 0 /100 WBCs    Neutrophils Relative 61 43 - 75 %    Immat GRANS % 0 0 - 2 %    Lymphocytes Relative 29 14 - 44 %    Monocytes Relative 8 4 - 12 %    Eosinophils Relative 2 0 - 6 %    Basophils Relative 0 0 - 1 %    Neutrophils Absolute 4 20 1 85 - 7 62 Thousands/µL    Immature Grans Absolute 0 02 0 00 - 0 20 Thousand/uL    Lymphocytes Absolute 2 03 0 60 - 4 47 Thousands/µL    Monocytes Absolute 0 52 0 17 - 1 22 Thousand/µL    Eosinophils Absolute 0 11 0 00 - 0 61 Thousand/µL    Basophils Absolute 0 03 0 00 - 0 10 Thousands/µL   PSA, Total Screen    Collection Time: 01/04/19  8:00 AM   Result Value Ref Range    PSA 0 5 0 0 - 4 0 ng/mL No

## 2025-02-17 ENCOUNTER — OFFICE VISIT (OUTPATIENT)
Dept: FAMILY MEDICINE CLINIC | Facility: CLINIC | Age: 60
End: 2025-02-17
Payer: COMMERCIAL

## 2025-02-17 VITALS
BODY MASS INDEX: 31.68 KG/M2 | WEIGHT: 239 LBS | SYSTOLIC BLOOD PRESSURE: 132 MMHG | DIASTOLIC BLOOD PRESSURE: 84 MMHG | OXYGEN SATURATION: 94 % | HEART RATE: 91 BPM | TEMPERATURE: 98 F | HEIGHT: 73 IN | RESPIRATION RATE: 16 BRPM

## 2025-02-17 DIAGNOSIS — R35.1 BENIGN PROSTATIC HYPERPLASIA WITH NOCTURIA: ICD-10-CM

## 2025-02-17 DIAGNOSIS — E78.01 FAMILIAL HYPERCHOLESTEROLEMIA: ICD-10-CM

## 2025-02-17 DIAGNOSIS — M51.369 LUMBAR DEGENERATIVE DISC DISEASE: ICD-10-CM

## 2025-02-17 DIAGNOSIS — N40.1 BENIGN PROSTATIC HYPERPLASIA WITH NOCTURIA: ICD-10-CM

## 2025-02-17 DIAGNOSIS — J06.9 ACUTE URI: Primary | ICD-10-CM

## 2025-02-17 DIAGNOSIS — R73.01 IFG (IMPAIRED FASTING GLUCOSE): ICD-10-CM

## 2025-02-17 DIAGNOSIS — Z12.5 SCREENING FOR PROSTATE CANCER: ICD-10-CM

## 2025-02-17 PROCEDURE — 99213 OFFICE O/P EST LOW 20 MIN: CPT | Performed by: FAMILY MEDICINE

## 2025-02-17 RX ORDER — FLUTICASONE PROPIONATE 50 MCG
2 SPRAY, SUSPENSION (ML) NASAL DAILY
Qty: 15.8 ML | Refills: 1 | Status: SHIPPED | OUTPATIENT
Start: 2025-02-17

## 2025-02-17 RX ORDER — TADALAFIL 5 MG/1
5 TABLET ORAL
Qty: 90 TABLET | Refills: 3 | Status: SHIPPED | OUTPATIENT
Start: 2025-02-17

## 2025-02-17 RX ORDER — MELOXICAM 7.5 MG/1
7.5 TABLET ORAL DAILY
Qty: 90 TABLET | Refills: 3 | Status: SHIPPED | OUTPATIENT
Start: 2025-02-17 | End: 2025-05-18

## 2025-02-17 NOTE — PROGRESS NOTES
"Name: Slick Farah      : 1965      MRN: [id number]  Encounter Provider: Stanislaw Ocampo MD  Encounter Date: 2025   Encounter department: [facility]  :  Assessment & Plan  Acute URI    Orders:    fluticasone (FLONASE) 50 mcg/act nasal spray; 2 sprays into each nostril daily    amoxicillin-clavulanate (AUGMENTIN) 875-125 mg per tablet; Take 1 tablet by mouth 2 (two) times daily after meals for 7 days    Benign prostatic hyperplasia with nocturia    Orders:    tadalafil (CIALIS) 5 MG tablet; Take 1 tablet (5 mg total) by mouth daily at bedtime    Lumbar degenerative disc disease    Orders:    meloxicam (Mobic) 7.5 mg tablet; Take 1 tablet (7.5 mg total) by mouth daily    CBC and differential    Familial hypercholesterolemia    Orders:    Comprehensive metabolic panel    Lipid panel    IFG (impaired fasting glucose)    Orders:    Hemoglobin A1C    Screening for prostate cancer    Orders:    PSA, Total Screen; Future    Continue with symptom treatment.  Recheck as needed       History of Present Illness   1 week history of persistent nasal congestion with green rhinorrhea, sinus pressure progressing to cough productive of yellow phlegm.  No fevers or chills.  No shortness of breath.  He has been using Delsym, Flonase and saline nasal spray      Review of Systems   Constitutional:  Negative for appetite change, chills, fever and unexpected weight change.   HENT:  Positive for congestion, rhinorrhea and sinus pressure.    Respiratory:  Positive for cough. Negative for shortness of breath and wheezing.    Gastrointestinal:  Negative for diarrhea, nausea and vomiting.   Musculoskeletal:  Negative for myalgias.   Neurological:  Negative for headaches.   Hematological:  Negative for adenopathy.       Objective   /84 (BP Location: Left arm, Patient Position: Sitting, Cuff Size: Large)   Pulse 91   Temp 98 °F (36.7 °C) (Temporal)   Resp 16   Ht 6' 1\" (1.854 m)   Wt 108 kg (239 lb)  "  SpO2 94%   BMI 31.53 kg/m²      Physical Exam  Constitutional:       General: He is not in acute distress.  HENT:      Right Ear: Tympanic membrane and ear canal normal.      Left Ear: Tympanic membrane and ear canal normal.      Nose:      Comments: No sinus tenderness      Mouth/Throat:      Pharynx: No posterior oropharyngeal erythema.   Eyes:      Conjunctiva/sclera: Conjunctivae normal.   Cardiovascular:      Rate and Rhythm: Normal rate and regular rhythm.      Heart sounds: No murmur heard.     No gallop.   Pulmonary:      Effort: No respiratory distress.      Breath sounds: No wheezing or rales.   Skin:     Findings: No rash.   Neurological:      Mental Status: He is alert and oriented to person, place, and time.

## 2025-02-17 NOTE — ASSESSMENT & PLAN NOTE
Orders:    meloxicam (Mobic) 7.5 mg tablet; Take 1 tablet (7.5 mg total) by mouth daily    CBC and differential

## 2025-03-11 DIAGNOSIS — J06.9 ACUTE URI: ICD-10-CM

## 2025-03-12 RX ORDER — FLUTICASONE PROPIONATE 50 MCG
SPRAY, SUSPENSION (ML) NASAL
Qty: 48 ML | Refills: 1 | Status: SHIPPED | OUTPATIENT
Start: 2025-03-12

## 2025-04-26 ENCOUNTER — APPOINTMENT (OUTPATIENT)
Dept: LAB | Age: 60
End: 2025-04-26
Payer: COMMERCIAL

## 2025-04-26 DIAGNOSIS — Z12.5 SCREENING FOR PROSTATE CANCER: ICD-10-CM

## 2025-04-26 LAB
ALBUMIN SERPL BCG-MCNC: 4.4 G/DL (ref 3.5–5)
ALP SERPL-CCNC: 82 U/L (ref 34–104)
ALT SERPL W P-5'-P-CCNC: 35 U/L (ref 7–52)
ANION GAP SERPL CALCULATED.3IONS-SCNC: 9 MMOL/L (ref 4–13)
AST SERPL W P-5'-P-CCNC: 21 U/L (ref 13–39)
BASOPHILS # BLD AUTO: 0.04 THOUSANDS/ÂΜL (ref 0–0.1)
BASOPHILS NFR BLD AUTO: 1 % (ref 0–1)
BILIRUB SERPL-MCNC: 0.79 MG/DL (ref 0.2–1)
BUN SERPL-MCNC: 28 MG/DL (ref 5–25)
CALCIUM SERPL-MCNC: 9.4 MG/DL (ref 8.4–10.2)
CHLORIDE SERPL-SCNC: 104 MMOL/L (ref 96–108)
CHOLEST SERPL-MCNC: 288 MG/DL (ref ?–200)
CO2 SERPL-SCNC: 27 MMOL/L (ref 21–32)
CREAT SERPL-MCNC: 0.88 MG/DL (ref 0.6–1.3)
EOSINOPHIL # BLD AUTO: 0.14 THOUSAND/ÂΜL (ref 0–0.61)
EOSINOPHIL NFR BLD AUTO: 2 % (ref 0–6)
ERYTHROCYTE [DISTWIDTH] IN BLOOD BY AUTOMATED COUNT: 12.9 % (ref 11.6–15.1)
EST. AVERAGE GLUCOSE BLD GHB EST-MCNC: 120 MG/DL
GFR SERPL CREATININE-BSD FRML MDRD: 92 ML/MIN/1.73SQ M
GLUCOSE P FAST SERPL-MCNC: 99 MG/DL (ref 65–99)
HBA1C MFR BLD: 5.8 %
HCT VFR BLD AUTO: 50.9 % (ref 36.5–49.3)
HDLC SERPL-MCNC: 61 MG/DL
HGB BLD-MCNC: 16.7 G/DL (ref 12–17)
IMM GRANULOCYTES # BLD AUTO: 0.02 THOUSAND/UL (ref 0–0.2)
IMM GRANULOCYTES NFR BLD AUTO: 0 % (ref 0–2)
LDLC SERPL CALC-MCNC: 170 MG/DL (ref 0–100)
LYMPHOCYTES # BLD AUTO: 2 THOUSANDS/ÂΜL (ref 0.6–4.47)
LYMPHOCYTES NFR BLD AUTO: 27 % (ref 14–44)
MCH RBC QN AUTO: 29.9 PG (ref 26.8–34.3)
MCHC RBC AUTO-ENTMCNC: 32.8 G/DL (ref 31.4–37.4)
MCV RBC AUTO: 91 FL (ref 82–98)
MONOCYTES # BLD AUTO: 0.62 THOUSAND/ÂΜL (ref 0.17–1.22)
MONOCYTES NFR BLD AUTO: 9 % (ref 4–12)
NEUTROPHILS # BLD AUTO: 4.47 THOUSANDS/ÂΜL (ref 1.85–7.62)
NEUTS SEG NFR BLD AUTO: 61 % (ref 43–75)
NONHDLC SERPL-MCNC: 227 MG/DL
NRBC BLD AUTO-RTO: 0 /100 WBCS
PLATELET # BLD AUTO: 198 THOUSANDS/UL (ref 149–390)
PMV BLD AUTO: 9.7 FL (ref 8.9–12.7)
POTASSIUM SERPL-SCNC: 4.6 MMOL/L (ref 3.5–5.3)
PROT SERPL-MCNC: 6.9 G/DL (ref 6.4–8.4)
PSA SERPL-MCNC: 0.88 NG/ML (ref 0–4)
RBC # BLD AUTO: 5.58 MILLION/UL (ref 3.88–5.62)
SODIUM SERPL-SCNC: 140 MMOL/L (ref 135–147)
TRIGL SERPL-MCNC: 287 MG/DL (ref ?–150)
WBC # BLD AUTO: 7.29 THOUSAND/UL (ref 4.31–10.16)

## 2025-04-26 PROCEDURE — G0103 PSA SCREENING: HCPCS

## 2025-04-27 ENCOUNTER — RESULTS FOLLOW-UP (OUTPATIENT)
Dept: FAMILY MEDICINE CLINIC | Facility: CLINIC | Age: 60
End: 2025-04-27

## 2025-05-06 ENCOUNTER — RA CDI HCC (OUTPATIENT)
Dept: OTHER | Facility: HOSPITAL | Age: 60
End: 2025-05-06

## 2025-05-13 ENCOUNTER — OFFICE VISIT (OUTPATIENT)
Dept: FAMILY MEDICINE CLINIC | Facility: CLINIC | Age: 60
End: 2025-05-13
Payer: COMMERCIAL

## 2025-05-13 VITALS
WEIGHT: 237.5 LBS | DIASTOLIC BLOOD PRESSURE: 76 MMHG | TEMPERATURE: 98.3 F | RESPIRATION RATE: 18 BRPM | SYSTOLIC BLOOD PRESSURE: 132 MMHG | HEART RATE: 78 BPM | BODY MASS INDEX: 31.48 KG/M2 | HEIGHT: 73 IN | OXYGEN SATURATION: 96 %

## 2025-05-13 DIAGNOSIS — E78.01 FAMILIAL HYPERCHOLESTEROLEMIA: ICD-10-CM

## 2025-05-13 DIAGNOSIS — Z00.00 ANNUAL PHYSICAL EXAM: Primary | ICD-10-CM

## 2025-05-13 DIAGNOSIS — E66.9 OBESITY (BMI 30-39.9): ICD-10-CM

## 2025-05-13 DIAGNOSIS — Z78.9 H/O FOREIGN TRAVEL: ICD-10-CM

## 2025-05-13 DIAGNOSIS — Z23 ENCOUNTER FOR IMMUNIZATION: ICD-10-CM

## 2025-05-13 PROCEDURE — 90677 PCV20 VACCINE IM: CPT

## 2025-05-13 PROCEDURE — 99396 PREV VISIT EST AGE 40-64: CPT

## 2025-05-13 PROCEDURE — 90471 IMMUNIZATION ADMIN: CPT

## 2025-05-13 RX ORDER — EZETIMIBE 10 MG/1
10 TABLET ORAL DAILY
Qty: 90 TABLET | Refills: 3 | Status: SHIPPED | OUTPATIENT
Start: 2025-05-13 | End: 2026-05-08

## 2025-05-13 RX ORDER — CIPROFLOXACIN 500 MG/1
500 TABLET, FILM COATED ORAL EVERY 12 HOURS SCHEDULED
Qty: 14 TABLET | Refills: 0 | Status: SHIPPED | OUTPATIENT
Start: 2025-05-13 | End: 2025-05-20

## 2025-05-13 NOTE — ASSESSMENT & PLAN NOTE
The 10-year ASCVD risk score (Jean Paul WALLS, et al., 2019) is: 11.7%    Values used to calculate the score:      Age: 59 years      Sex: Male      Is Non- : No      Diabetic: No      Tobacco smoker: No      Systolic Blood Pressure: 132 mmHg      Is BP treated: No      HDL Cholesterol: 61 mg/dL      Total Cholesterol: 288 mg/dL  Atorvastatin 80 mg.  Start ezetimibe 10 mg daily  Remains elevated. Limited exercise.   If LDL remains elevated could consider referral to cardiology for stress test and possible PSCK-9 inhibitor.  Patient would like to work on diet and exercise and repeat lipid panel in 6 months  Orders:    Lipid Panel with Direct LDL reflex; Future    ezetimibe (ZETIA) 10 mg tablet; Take 1 tablet (10 mg total) by mouth daily

## 2025-05-13 NOTE — PROGRESS NOTES
Adult Annual Physical  Name: Slick Farah      : 1965      MRN: [id number]  Encounter Provider: Melissa Curran MD  Encounter Date: 2025   Encounter department: [facility]    :  Assessment & Plan  Annual physical exam         Familial hypercholesterolemia  The 10-year ASCVD risk score (Jean Paul WALLS, et al., 2019) is: 11.7%    Values used to calculate the score:      Age: 59 years      Sex: Male      Is Non- : No      Diabetic: No      Tobacco smoker: No      Systolic Blood Pressure: 132 mmHg      Is BP treated: No      HDL Cholesterol: 61 mg/dL      Total Cholesterol: 288 mg/dL  Atorvastatin 80 mg.  Start ezetimibe 10 mg daily  Remains elevated. Limited exercise.   If LDL remains elevated could consider referral to cardiology for stress test and possible PSCK-9 inhibitor.  Patient would like to work on diet and exercise and repeat lipid panel in 6 months  Orders:    Lipid Panel with Direct LDL reflex; Future    ezetimibe (ZETIA) 10 mg tablet; Take 1 tablet (10 mg total) by mouth daily    H/O foreign travel    Orders:    ciprofloxacin (CIPRO) 500 mg tablet; Take 1 tablet (500 mg total) by mouth every 12 (twelve) hours for 7 days    Obesity (BMI 30-39.9)  Prior Authorization Clinical Questions for Weight Management Pharmacotherapy    2. Does the patient have a diagnosis of obesity, confirmed by a BMI greater than or equal to 30 kg/m^2?: Yes  3. Does the patient have a BMI of greater than or equal to 27 kg/m^2 with at least one weight-related comorbidity/risk factor/complication (e.g. diabetes, dyslipidemia, coronary artery disease)?: Yes  4. Weight-related co-morbidities/risk factors: dyslipidemia  7. Has the patient been on a weight loss regimen of low-calorie diet, increased physical activity, and lifestyle modifications for a minimum of 6 months?: Yes  8. Has the patient completed a comprehensive weight loss program (ie, Weight Watchers, Noom,  Bariatrics, other mahogany on phone)? If so, what?: Yes   -- Q8 Further Explanation: Brianna   9. Does the patient have a history of type 2 diabetes?: No     Baseline weight (in pounds): 238 lbs       Interested in starting weight loss medication.   Reach out to insurance            Preventive Screenings:  - Diabetes Screening: screening up-to-date  - Cholesterol Screening: screening not indicated and has hyperlipidemia   - Hepatitis C screening: screening up-to-date   - Colon cancer screening: screening up-to-date   - Lung cancer screening: screening not indicated   - Prostate cancer screening: screening up-to-date     Immunizations:  - Immunizations due: Tdap      Depression Screening and Follow-up Plan: Patient was screened for depression during today's encounter. They screened negative with a PHQ-2 score of 0.          History of Present Illness     Adult Annual Physical:  Patient presents for annual physical.     Diet and Physical Activity:  - Diet/Nutrition: no special diet and portion control.  - Exercise: no formal exercise and walking.    Depression Screening:  - PHQ-2 Score: 0    General Health:  - Sleep: 4-6 hours of sleep on average.  - Hearing: normal hearing right ear and decreased hearing left ear.  - Vision: most recent eye exam < 1 year ago and wears glasses and contacts.  - Dental: regular dental visits and brushes teeth twice daily.    /GYN Health:  - Follows with GYN: no.   - History of STDs: no     Health:  - History of STDs: no.   - Urinary symptoms: weak urinary stream.     Advanced Care Planning:  - Has an advanced directive?: no    - Has a durable medical POA?: no      Review of Systems   Constitutional:  Positive for unexpected weight change. Negative for activity change, fatigue and fever.   Eyes:  Negative for visual disturbance.   Respiratory:  Negative for shortness of breath.    Cardiovascular:  Negative for chest pain.   Gastrointestinal:  Negative for abdominal pain,  constipation, diarrhea and nausea.   Endocrine: Negative for cold intolerance and heat intolerance.   Musculoskeletal:  Negative for back pain.   Skin:  Negative for rash.   Neurological:  Negative for headaches.   Psychiatric/Behavioral:  Negative for confusion.      Medical History Reviewed by provider this encounter:  Tobacco  Allergies  Meds  Problems  Med Hx  Surg Hx  Fam Hx     .  Current Outpatient Medications on File Prior to Visit   Medication Sig Dispense Refill    Ascorbic Acid (Vitamin C) 500 MG CHEW Chew 1 Dose daily      aspirin 81 MG tablet Take 81 mg by mouth daily.      atorvastatin (LIPITOR) 80 mg tablet TAKE 1 TABLET DAILY 90 tablet 1    b complex vitamins capsule Take 1 capsule by mouth daily.      Cholecalciferol (D3-1000) 25 MCG (1000 UT) capsule Take 2 capsules by mouth daily      Cinnamon 500 MG capsule       fluticasone (FLONASE) 50 mcg/act nasal spray SPRAY 2 SPRAYS INTO EACH NOSTRIL EVERY DAY 48 mL 1    Loratadine 10 MG CAPS Take 1 capsule by mouth daily      Magnesium 500 MG TABS Take 1 tablet by mouth daily      meloxicam (Mobic) 7.5 mg tablet Take 1 tablet (7.5 mg total) by mouth daily 90 tablet 3    Multiple Vitamin (MULTIVITAMIN) capsule Take 1 capsule by mouth daily.      tadalafil (CIALIS) 5 MG tablet Take 1 tablet (5 mg total) by mouth daily at bedtime 90 tablet 3    vitamin E, tocopherol, 400 units capsule Take 400 Units by mouth daily.      Zinc 50 MG TABS Take 1 tablet by mouth daily       No current facility-administered medications on file prior to visit.      Social History     Tobacco Use    Smoking status: Never    Smokeless tobacco: Never   Vaping Use    Vaping status: Never Used   Substance and Sexual Activity    Alcohol use: Yes     Alcohol/week: 6.0 standard drinks of alcohol     Types: 6 Cans of beer per week     Comment: social    Drug use: Never    Sexual activity: Yes     Partners: Female     Birth control/protection: None       Objective   /76 (BP  "Location: Left arm, Patient Position: Sitting, Cuff Size: Standard)   Pulse 78   Temp 98.3 °F (36.8 °C) (Temporal)   Resp 18   Ht 6' 1\" (1.854 m)   Wt 108 kg (237 lb 8 oz)   SpO2 96%   BMI 31.33 kg/m²     Physical Exam  Vitals and nursing note reviewed.   Constitutional:       Appearance: He is well-developed. He is obese.   HENT:      Head: Normocephalic and atraumatic.   Cardiovascular:      Rate and Rhythm: Normal rate and regular rhythm.   Pulmonary:      Effort: Pulmonary effort is normal.      Breath sounds: Normal breath sounds.   Abdominal:      General: Bowel sounds are normal.      Palpations: Abdomen is soft.   Musculoskeletal:      Cervical back: Normal range of motion.   Skin:     General: Skin is warm.   Neurological:      General: No focal deficit present.      Mental Status: He is alert.   Psychiatric:         Mood and Affect: Mood normal.         Speech: Speech normal.         "

## 2025-05-15 ENCOUNTER — TELEPHONE (OUTPATIENT)
Age: 60
End: 2025-05-15

## 2025-05-15 NOTE — TELEPHONE ENCOUNTER
PT said he spoke with his pharmacy, [facility], and was told that they will cover either wegovy, or Zepbound, however a pA would be needed. PT said that [facility] would be faxing an auth form to get completed for either med.    PT requesting to please put order in for whatever Dr EAGLE would like him to be on, and get the PA started.    Please advise.    ThankYou

## 2025-05-20 DIAGNOSIS — E78.01 FAMILIAL HYPERCHOLESTEROLEMIA: ICD-10-CM

## 2025-05-20 RX ORDER — ATORVASTATIN CALCIUM 80 MG/1
80 TABLET, FILM COATED ORAL DAILY
Qty: 90 TABLET | Refills: 1 | Status: SHIPPED | OUTPATIENT
Start: 2025-05-20

## 2025-05-21 ENCOUNTER — TELEPHONE (OUTPATIENT)
Dept: FAMILY MEDICINE CLINIC | Facility: CLINIC | Age: 60
End: 2025-05-21

## 2025-05-21 ENCOUNTER — PATIENT MESSAGE (OUTPATIENT)
Dept: FAMILY MEDICINE CLINIC | Facility: CLINIC | Age: 60
End: 2025-05-21

## 2025-05-21 ENCOUNTER — TELEPHONE (OUTPATIENT)
Age: 60
End: 2025-05-21

## 2025-05-21 DIAGNOSIS — E66.811 CLASS 1 OBESITY DUE TO DISRUPTION OF MC4R PATHWAY WITH SERIOUS COMORBIDITY AND BODY MASS INDEX (BMI) OF 31.0 TO 31.9 IN ADULT: Primary | ICD-10-CM

## 2025-05-21 DIAGNOSIS — E88.82 CLASS 1 OBESITY DUE TO DISRUPTION OF MC4R PATHWAY WITH SERIOUS COMORBIDITY AND BODY MASS INDEX (BMI) OF 31.0 TO 31.9 IN ADULT: Primary | ICD-10-CM

## 2025-05-21 DIAGNOSIS — Z15.2 CLASS 1 OBESITY DUE TO DISRUPTION OF MC4R PATHWAY WITH SERIOUS COMORBIDITY AND BODY MASS INDEX (BMI) OF 31.0 TO 31.9 IN ADULT: Primary | ICD-10-CM

## 2025-05-21 RX ORDER — TIRZEPATIDE 2.5 MG/.5ML
2.5 INJECTION, SOLUTION SUBCUTANEOUS WEEKLY
Qty: 2 ML | Refills: 0 | Status: SHIPPED | OUTPATIENT
Start: 2025-05-21 | End: 2025-06-18

## 2025-05-21 NOTE — TELEPHONE ENCOUNTER
PA tadalafil (CIALIS) 5 MG APPROVED     Date(s) approved 4/21/25 - 5/21/26     Case # [id number]    Patient advised by          [x]MyChart Message  []Phone call   []LMOM  []L/M to call office as no active Communication consent on file  []Unable to leave detailed message as VM not approved on Communication consent       Pharmacy advised by    [x]Fax  []Phone call  []Secure Chat    Specialty Pharmacy    []      Approval letter scanned into [employer] No insurance company is faxing approval letter      Quantity limit - PAL:   Case ID: [id number], Approved: 4/21/25 - 5/21/26

## 2025-05-21 NOTE — TELEPHONE ENCOUNTER
PA tadalafil (CIALIS) 5 MG SUBMITTED    to [facility]     via    []CMM-KEY:    []Surescripts-Case ID #     []Availity-Auth ID #  NDC #    []Faxed to plan   []Other website    [x]Phone call Case ID #  [id number]     []PA sent as URGENT    All office notes, labs and other pertaining documents and studies sent. Clinical questions answered. Awaiting determination from insurance company.     Turnaround time for your insurance to make a decision on your Prior Authorization can take 7-21 business days.

## 2025-05-21 NOTE — TELEPHONE ENCOUNTER
Reason for call:   [x] Prior Auth  [] Other:     Caller:  [x] Patient  [] Pharmacy  Name:   Address:   Callback Number:     Medication: tadalafil (CIALIS) 5 MG tablet     Dose/Frequency: Take 1 tablet (5 mg total) by mouth daily at bedtime     Quantity: 90 tablets    Ordering Provider:   [x] PCP/Provider - Dr. Ocampo   [] Speciality/Provider -     Has the patient tried other medications and failed? If failed, which medications did they fail?    [] No   [] Yes -     Is the patient's insurance updated in EPIC?   [x] Yes   [] No     Is a copy of the patient's insurance scanned in EPIC?   [] Yes   [] No

## 2025-05-22 ENCOUNTER — TELEPHONE (OUTPATIENT)
Age: 60
End: 2025-05-22

## 2025-05-22 NOTE — TELEPHONE ENCOUNTER
PA Zepbound 2.5 MG/0.5ML  APPROVED         Patient advised by          [x]MyChart Message  []Phone call   []LMOM  []L/M to call office as no active Communication consent on file  []Unable to leave detailed message as VM not approved on Communication consent       Pharmacy advised by    [x]Fax  []Phone call  []Secure Chat    Specialty Pharmacy    []

## 2025-05-22 NOTE — TELEPHONE ENCOUNTER
PA Zepbound 2.5 MG/0.5ML SUBMITTED    to [facility]     via    []CMM-KEY:    [x]Surescripts-Case ID # [id number]   []Availity-Auth ID #  NDC #    []Faxed to plan   []Other website    []Phone call Case ID #      []PA sent as URGENT    All office notes, labs and other pertaining documents and studies sent. Clinical questions answered. Awaiting determination from insurance company.     Turnaround time for your insurance to make a decision on your Prior Authorization can take 7-21 business days.

## 2025-06-04 DIAGNOSIS — Z15.2 CLASS 1 OBESITY DUE TO DISRUPTION OF MC4R PATHWAY WITH SERIOUS COMORBIDITY AND BODY MASS INDEX (BMI) OF 31.0 TO 31.9 IN ADULT: Primary | ICD-10-CM

## 2025-06-04 DIAGNOSIS — E66.811 CLASS 1 OBESITY DUE TO DISRUPTION OF MC4R PATHWAY WITH SERIOUS COMORBIDITY AND BODY MASS INDEX (BMI) OF 31.0 TO 31.9 IN ADULT: Primary | ICD-10-CM

## 2025-06-04 DIAGNOSIS — E88.82 CLASS 1 OBESITY DUE TO DISRUPTION OF MC4R PATHWAY WITH SERIOUS COMORBIDITY AND BODY MASS INDEX (BMI) OF 31.0 TO 31.9 IN ADULT: Primary | ICD-10-CM

## 2025-06-04 RX ORDER — TIRZEPATIDE 5 MG/.5ML
5 INJECTION, SOLUTION SUBCUTANEOUS WEEKLY
Qty: 2 ML | Refills: 0 | Status: SHIPPED | OUTPATIENT
Start: 2025-06-04

## 2025-06-11 ENCOUNTER — OFFICE VISIT (OUTPATIENT)
Dept: UROLOGY | Facility: AMBULATORY SURGERY CENTER | Age: 60
End: 2025-06-11
Payer: COMMERCIAL

## 2025-06-11 VITALS
BODY MASS INDEX: 30.14 KG/M2 | DIASTOLIC BLOOD PRESSURE: 100 MMHG | HEART RATE: 79 BPM | WEIGHT: 227.4 LBS | OXYGEN SATURATION: 95 % | SYSTOLIC BLOOD PRESSURE: 140 MMHG | HEIGHT: 73 IN

## 2025-06-11 DIAGNOSIS — N20.0 NEPHROLITHIASIS: ICD-10-CM

## 2025-06-11 DIAGNOSIS — N40.1 BENIGN PROSTATIC HYPERPLASIA WITH LOWER URINARY TRACT SYMPTOMS, SYMPTOM DETAILS UNSPECIFIED: Primary | ICD-10-CM

## 2025-06-11 DIAGNOSIS — Z12.5 SCREENING FOR PROSTATE CANCER: ICD-10-CM

## 2025-06-11 LAB — POST-VOID RESIDUAL VOLUME, ML POC: 82 ML

## 2025-06-11 PROCEDURE — 51798 US URINE CAPACITY MEASURE: CPT

## 2025-06-11 PROCEDURE — 99213 OFFICE O/P EST LOW 20 MIN: CPT

## 2025-06-11 NOTE — ASSESSMENT & PLAN NOTE
Patient currently well-controlled on Cialis 5 mg daily  We reviewed bladder irritants and discussed avoidance of these will decrease his frequency/urgency  Recommend increasing fluid hydration of water upwards of 40 to 60 ounces daily  Patient will remain on Cialis 5 mg daily and we will continue to monitor for worsening/progression of lower urinary tract symptoms

## 2025-06-11 NOTE — ASSESSMENT & PLAN NOTE
Multiparametric MRI of the prostate performed 7/29/2024 graded the patient has PI-RADS category 2 with no suspicious lesion for prostate cancer and a calculated prostate volume of 40 mL  Patient's most recent PSA was performed 4/26/2025 and returned stable at a value of 0.876.  Refer to PSA trend below.  EVA performed today.  Palpate benign prostate.  Refer to physical exam findings.  Reassured the patient that his EVA in the office today was unremarkable.  Reviewed his PSA and discussed that it was stable and low and that we could plan to repeat his PSA in 1 year.  Patient will undergo a repeat PSA in 1 year we will follow-up in the office at that time and undergo EVA.    Lab Results   Component Value Date    PSA 0.876 04/26/2025    PSA 0.81 03/28/2024    PSA 0.5 04/08/2023

## 2025-06-11 NOTE — PROGRESS NOTES
6/11/2025      Assessment and Plan    59 y.o. male managed by our office    Nephrolithiasis  CT abdomen and pelvis with contrast performed 6/8/2024 revealed several nonobstructing renal calculi bilaterally and a bladder calculi measuring up to 6 mm  We discussed that we should pursue an ultrasound of the kidney and bladder and a KUB in 1 year for continued surveillance  We discussed options of elective procedures for management of his nonobstructing stones including uteroscopy laser lithotripsy and ESWL, but the patient defers either these at this time.  We did discuss cystolitholopaxy for treatment of his bladder stone, but the patient defers this at this time as well.  Patient will undergo an ultrasound of the kidney and bladder and a KUB in 1 year we will follow-up in the office at that time    Screening for prostate cancer  Multiparametric MRI of the prostate performed 7/29/2024 graded the patient has PI-RADS category 2 with no suspicious lesion for prostate cancer and a calculated prostate volume of 40 mL  Patient's most recent PSA was performed 4/26/2025 and returned stable at a value of 0.876.  Refer to PSA trend below.  EVA performed today.  Palpate benign prostate.  Refer to physical exam findings.  Reassured the patient that his EVA in the office today was unremarkable.  Reviewed his PSA and discussed that it was stable and low and that we could plan to repeat his PSA in 1 year.  Patient will undergo a repeat PSA in 1 year we will follow-up in the office at that time and undergo EVA.    Lab Results   Component Value Date    PSA 0.876 04/26/2025    PSA 0.81 03/28/2024    PSA 0.5 04/08/2023        Benign prostatic hyperplasia with lower urinary tract symptoms  Patient currently well-controlled on Cialis 5 mg daily  We reviewed bladder irritants and discussed avoidance of these will decrease his frequency/urgency  Recommend increasing fluid hydration of water upwards of 40 to 60 ounces daily  Patient will  remain on Cialis 5 mg daily and we will continue to monitor for worsening/progression of lower urinary tract symptoms        History of Present Illness  Slick Farah is a 59 y.o. male here for evaluation of prostate cancer screening, history of nephrolithiasis/bladder calculi, and BPH with obstruction/lower urinary tract symptoms.  Patient was last seen in the office on 6/19/2024.    Patient has a history of nephrolithiasis/bladder calculus and is status post cystolitholopaxy performed in April 2022 and 2 large bladder calculi were removed and extracted at that time.  Patient's last upper tract imaging was a CT abdomen and pelvis with contrast performed 6/8/2024 which revealed several nonobstructing renal calculi bilaterally with 3 stones in the right kidney with the largest measuring up to 6 mm and 3 stones in the left kidney with the largest measuring up to 6 mm.  Furthermore, a bladder calculi measuring 6 mm was noted.  Patient deferred management of the nonobstructing stones in the bladder calculi at the time of his last visit.    Patient was noted to have an increased induration on the left side of his prostate on EVA at the time of his last visit.  Patient was recommended undergoing multiparametric MRI of the prostate which was obtained 7/29/2024.  Patient was graded as PI-RADS category 2 with no suspicious lesion for prostate cancer and a calculated prostate volume of 40 mL.  Patient's most recent PSA was performed 4/26/2025 and returned stable at a value of 0.876.    Patient is currently controlled on Cialis 5 mg daily for treatment of his lower urinary tract symptoms.    Today, the patient offers no new lower urinary tract complaints.  Patient does endorse some degree of a weakened urinary stream and urinary urgency, but is currently content with his voiding pattern on the Cialis 5 mg daily.  Patient denies any new kidney stone episodes since his last office appointment 1 year ago.  Patient notes that  he would still like to continue with surveillance and does not want to undergo an elective procedure at this time.        Review of Systems   Constitutional:  Negative for chills and fever.   HENT:  Negative for ear pain and sore throat.    Eyes:  Negative for pain and visual disturbance.   Respiratory:  Negative for cough and shortness of breath.    Cardiovascular:  Negative for chest pain and palpitations.   Gastrointestinal:  Negative for abdominal pain and vomiting.   Genitourinary:  Positive for difficulty urinating and urgency. Negative for decreased urine volume, dysuria, flank pain, frequency and hematuria.   Musculoskeletal:  Negative for arthralgias and back pain.   Skin:  Negative for color change and rash.   Neurological:  Negative for seizures and syncope.   All other systems reviewed and are negative.          AUA SYMPTOM SCORE      Flowsheet Row Most Recent Value   AUA SYMPTOM SCORE    How often have you had a sensation of not emptying your bladder completely after you finished urinating? 1 (P)     How often have you had to urinate again less than two hours after you finished urinating? 1 (P)     How often have you found you stopped and started again several times when you urinate? 1 (P)     How often have you found it difficult to postpone urination? 1 (P)     How often have you had a weak urinary stream? 2 (P)     How often have you had to push or strain to begin urination? 1 (P)     How many times did you most typically get up to urinate from the time you went to bed at night until the time you got up in the morning? 0 (P)     Quality of Life: If you were to spend the rest of your life with your urinary condition just the way it is now, how would you feel about that? 1 (P)     AUA SYMPTOM SCORE 7 (P)               Vitals  Vitals:    06/11/25 1358   BP: 140/100   BP Location: Left arm   Patient Position: Sitting   Cuff Size: Standard   Pulse: 79   SpO2: 95%   Weight: 103 kg (227 lb 6.4 oz)   Height:  "6' 1\" (1.854 m)       Physical Exam  Vitals reviewed.   Constitutional:       General: He is not in acute distress.     Appearance: Normal appearance. He is not ill-appearing.   HENT:      Head: Normocephalic and atraumatic.      Nose: Nose normal.     Eyes:      General: No scleral icterus.    Pulmonary:      Effort: No respiratory distress.   Abdominal:      General: Abdomen is flat. There is no distension.      Palpations: Abdomen is soft.      Tenderness: There is no abdominal tenderness.   Genitourinary:     Comments: EVA performed today.  Prostate estimated to be about 40 g and smooth bilaterally without nodularity or induration.    Musculoskeletal:         General: Normal range of motion.      Cervical back: Normal range of motion.     Skin:     General: Skin is warm.      Coloration: Skin is not jaundiced.     Neurological:      Mental Status: He is alert and oriented to person, place, and time.      Gait: Gait normal.     Psychiatric:         Mood and Affect: Mood normal.         Behavior: Behavior normal.           Past History  Past Medical History[1]  Social History[2]  Tobacco Use History[3]  Family History[4]    The following portions of the patient's history were reviewed and updated as appropriate: allergies, current medications, past medical history, past social history, past surgical history and problem list.    Results  Recent Results (from the past hour)   POCT Measure PVR    Collection Time: 06/11/25  2:06 PM   Result Value Ref Range    POST-VOID RESIDUAL VOLUME, ML POC 82 mL   ]  Lab Results   Component Value Date    PSA 0.876 04/26/2025    PSA 0.81 03/28/2024    PSA 0.5 04/08/2023    PSA 0.6 02/05/2022     Lab Results   Component Value Date    GLUCOSE 94 11/20/2015    CALCIUM 9.4 04/26/2025     11/20/2015    K 4.6 04/26/2025    CO2 27 04/26/2025     04/26/2025    BUN 28 (H) 04/26/2025    CREATININE 0.88 04/26/2025     Lab Results   Component Value Date    WBC 7.29 04/26/2025    HGB " 16.7 04/26/2025    HCT 50.9 (H) 04/26/2025    MCV 91 04/26/2025     04/26/2025             [1]   Past Medical History:  Diagnosis Date    Acute sinusitis     Allergic 1999    Seasonal    Allergic rhinitis     BPH (benign prostatic hypertrophy) 2018    Chronic sinusitis     last assessed 10/23/2012    Clotting disorder (HCC) 2022    On and off for months until the last week. Multiple times a day.    Diverticulosis 2021    Identified by recent CT scan and last Colonoscopy    Fractures 1990    Left hand    Hyperlipemia     Kidney stone 5/2001    Seeing Urologist yearly    Other tear of medial meniscus, current injury, left knee, initial encounter 10/21/2022   [2]   Social History  Socioeconomic History    Marital status: /Civil Union   Occupational History     Employer: [employer]   Tobacco Use    Smoking status: Never    Smokeless tobacco: Never   Vaping Use    Vaping status: Never Used   Substance and Sexual Activity    Alcohol use: Yes     Alcohol/week: 6.0 standard drinks of alcohol     Types: 6 Cans of beer per week     Comment: social    Drug use: Never    Sexual activity: Yes     Partners: Female     Birth control/protection: None     Social Drivers of Health     Food Insecurity: Patient Declined (5/8/2025)    Nursing - Inadequate Food Risk Classification     Worried About Running Out of Food in the Last Year: Patient declined     Ran Out of Food in the Last Year: Patient declined   Transportation Needs: Patient Declined (5/8/2025)    PRAPARE - Transportation     Lack of Transportation (Medical): Patient declined     Lack of Transportation (Non-Medical): Patient declined   Housing Stability: Patient Declined (5/8/2025)    Housing Stability Vital Sign     Unable to Pay for Housing in the Last Year: Patient declined     Homeless in the Last Year: Patient declined   [3]   Social History  Tobacco Use   Smoking Status Never   Smokeless Tobacco Never   [4]   Family History  Problem Relation Name Age of  Onset    Nephrolithiasis Mother Marcela     Hyperlipidemia Mother Marcela         Under treatment    Hypertension Mother Marcela     Urolithiasis Mother Marcela     Coronary artery disease Mother Marcela     COPD Mother Marcela         Under treatment    Hyperlipidemia Father Slick         Under treatment    Nephrolithiasis Brother      Multiple sclerosis Sister      Coronary artery disease Maternal Grandmother      Autoimmune disease Sister Lilia         MS diagnosed in 2018    Cancer Paternal Grandfather Slick         Esophageal

## 2025-06-11 NOTE — ASSESSMENT & PLAN NOTE
CT abdomen and pelvis with contrast performed 6/8/2024 revealed several nonobstructing renal calculi bilaterally and a bladder calculi measuring up to 6 mm  We discussed that we should pursue an ultrasound of the kidney and bladder and a KUB in 1 year for continued surveillance  We discussed options of elective procedures for management of his nonobstructing stones including uteroscopy laser lithotripsy and ESWL, but the patient defers either these at this time.  We did discuss cystolitholopaxy for treatment of his bladder stone, but the patient defers this at this time as well.  Patient will undergo an ultrasound of the kidney and bladder and a KUB in 1 year we will follow-up in the office at that time

## 2025-07-11 DIAGNOSIS — E66.811 CLASS 1 OBESITY DUE TO DISRUPTION OF MC4R PATHWAY WITH SERIOUS COMORBIDITY AND BODY MASS INDEX (BMI) OF 31.0 TO 31.9 IN ADULT: ICD-10-CM

## 2025-07-11 DIAGNOSIS — Z15.2 CLASS 1 OBESITY DUE TO DISRUPTION OF MC4R PATHWAY WITH SERIOUS COMORBIDITY AND BODY MASS INDEX (BMI) OF 31.0 TO 31.9 IN ADULT: ICD-10-CM

## 2025-07-11 DIAGNOSIS — E88.82 CLASS 1 OBESITY DUE TO DISRUPTION OF MC4R PATHWAY WITH SERIOUS COMORBIDITY AND BODY MASS INDEX (BMI) OF 31.0 TO 31.9 IN ADULT: ICD-10-CM

## 2025-07-11 PROBLEM — Z12.5 SCREENING FOR PROSTATE CANCER: Status: RESOLVED | Noted: 2025-06-11 | Resolved: 2025-07-11

## 2025-07-11 RX ORDER — TIRZEPATIDE 5 MG/.5ML
5 INJECTION, SOLUTION SUBCUTANEOUS WEEKLY
Qty: 2 ML | Refills: 0 | Status: SHIPPED | OUTPATIENT
Start: 2025-07-11

## 2025-07-14 ENCOUNTER — APPOINTMENT (EMERGENCY)
Dept: CT IMAGING | Facility: HOSPITAL | Age: 60
End: 2025-07-14
Payer: COMMERCIAL

## 2025-07-14 ENCOUNTER — HOSPITAL ENCOUNTER (EMERGENCY)
Facility: HOSPITAL | Age: 60
Discharge: HOME | End: 2025-07-14
Attending: EMERGENCY MEDICINE | Admitting: EMERGENCY MEDICINE
Payer: COMMERCIAL

## 2025-07-14 ENCOUNTER — NURSE TRIAGE (OUTPATIENT)
Age: 60
End: 2025-07-14

## 2025-07-14 ENCOUNTER — APPOINTMENT (OUTPATIENT)
Dept: LAB | Facility: MEDICAL CENTER | Age: 60
End: 2025-07-14
Payer: COMMERCIAL

## 2025-07-14 VITALS
DIASTOLIC BLOOD PRESSURE: 73 MMHG | SYSTOLIC BLOOD PRESSURE: 133 MMHG | RESPIRATION RATE: 18 BRPM | HEART RATE: 81 BPM | TEMPERATURE: 98 F | OXYGEN SATURATION: 97 %

## 2025-07-14 DIAGNOSIS — R10.9 FLANK PAIN: ICD-10-CM

## 2025-07-14 DIAGNOSIS — N20.1 URETEROLITHIASIS: Primary | ICD-10-CM

## 2025-07-14 DIAGNOSIS — R10.9 FLANK PAIN: Primary | ICD-10-CM

## 2025-07-14 LAB
ALBUMIN SERPL BCG-MCNC: 4.4 G/DL (ref 3.5–5)
ALP SERPL-CCNC: 71 U/L (ref 34–104)
ALT SERPL W P-5'-P-CCNC: 32 U/L (ref 7–52)
ANION GAP SERPL CALCULATED.3IONS-SCNC: 7 MMOL/L (ref 4–13)
AST SERPL W P-5'-P-CCNC: 24 U/L (ref 13–39)
BACTERIA UR QL AUTO: ABNORMAL /HPF
BASOPHILS # BLD AUTO: 0.02 THOUSANDS/ÂΜL (ref 0–0.1)
BASOPHILS NFR BLD AUTO: 0 % (ref 0–1)
BILIRUB SERPL-MCNC: 1 MG/DL (ref 0.2–1)
BILIRUB UR QL STRIP: NEGATIVE
BUN SERPL-MCNC: 25 MG/DL (ref 5–25)
CALCIUM SERPL-MCNC: 9 MG/DL (ref 8.4–10.2)
CHLORIDE SERPL-SCNC: 102 MMOL/L (ref 96–108)
CLARITY UR: ABNORMAL
CO2 SERPL-SCNC: 26 MMOL/L (ref 21–32)
COLOR UR: YELLOW
CREAT SERPL-MCNC: 1.02 MG/DL (ref 0.6–1.3)
EOSINOPHIL # BLD AUTO: 0.05 THOUSAND/ÂΜL (ref 0–0.61)
EOSINOPHIL NFR BLD AUTO: 1 % (ref 0–6)
ERYTHROCYTE [DISTWIDTH] IN BLOOD BY AUTOMATED COUNT: 12.5 % (ref 11.6–15.1)
GFR SERPL CREATININE-BSD FRML MDRD: 78 ML/MIN/1.73SQ M
GLUCOSE SERPL-MCNC: 124 MG/DL (ref 65–140)
GLUCOSE UR STRIP-MCNC: NEGATIVE MG/DL
HCT VFR BLD AUTO: 49.2 % (ref 36.5–49.3)
HGB BLD-MCNC: 16.6 G/DL (ref 12–17)
HGB UR QL STRIP.AUTO: ABNORMAL
IMM GRANULOCYTES # BLD AUTO: 0.03 THOUSAND/UL (ref 0–0.2)
IMM GRANULOCYTES NFR BLD AUTO: 0 % (ref 0–2)
KETONES UR STRIP-MCNC: NEGATIVE MG/DL
LEUKOCYTE ESTERASE UR QL STRIP: NEGATIVE
LYMPHOCYTES # BLD AUTO: 1.37 THOUSANDS/ÂΜL (ref 0.6–4.47)
LYMPHOCYTES NFR BLD AUTO: 16 % (ref 14–44)
MCH RBC QN AUTO: 30.5 PG (ref 26.8–34.3)
MCHC RBC AUTO-ENTMCNC: 33.7 G/DL (ref 31.4–37.4)
MCV RBC AUTO: 90 FL (ref 82–98)
MONOCYTES # BLD AUTO: 0.54 THOUSAND/ÂΜL (ref 0.17–1.22)
MONOCYTES NFR BLD AUTO: 6 % (ref 4–12)
NEUTROPHILS # BLD AUTO: 6.82 THOUSANDS/ÂΜL (ref 1.85–7.62)
NEUTS SEG NFR BLD AUTO: 77 % (ref 43–75)
NITRITE UR QL STRIP: NEGATIVE
NON-SQ EPI CELLS URNS QL MICRO: ABNORMAL /HPF
NRBC BLD AUTO-RTO: 0 /100 WBCS
PH UR STRIP.AUTO: 6 [PH]
PLATELET # BLD AUTO: 179 THOUSANDS/UL (ref 149–390)
PMV BLD AUTO: 9.3 FL (ref 8.9–12.7)
POTASSIUM SERPL-SCNC: 4.2 MMOL/L (ref 3.5–5.3)
PROT SERPL-MCNC: 6.8 G/DL (ref 6.4–8.4)
PROT UR STRIP-MCNC: ABNORMAL MG/DL
RBC # BLD AUTO: 5.45 MILLION/UL (ref 3.88–5.62)
RBC #/AREA URNS AUTO: ABNORMAL /HPF
SODIUM SERPL-SCNC: 135 MMOL/L (ref 135–147)
SP GR UR STRIP.AUTO: 1.02 (ref 1–1.03)
UROBILINOGEN UR STRIP-ACNC: <2 MG/DL
WBC # BLD AUTO: 8.83 THOUSAND/UL (ref 4.31–10.16)
WBC #/AREA URNS AUTO: ABNORMAL /HPF

## 2025-07-14 PROCEDURE — 36415 COLL VENOUS BLD VENIPUNCTURE: CPT | Performed by: EMERGENCY MEDICINE

## 2025-07-14 PROCEDURE — 74176 CT ABD & PELVIS W/O CONTRAST: CPT

## 2025-07-14 PROCEDURE — 99284 EMERGENCY DEPT VISIT MOD MDM: CPT | Performed by: EMERGENCY MEDICINE

## 2025-07-14 PROCEDURE — 87086 URINE CULTURE/COLONY COUNT: CPT

## 2025-07-14 PROCEDURE — 81001 URINALYSIS AUTO W/SCOPE: CPT

## 2025-07-14 PROCEDURE — 96365 THER/PROPH/DIAG IV INF INIT: CPT

## 2025-07-14 PROCEDURE — 99284 EMERGENCY DEPT VISIT MOD MDM: CPT

## 2025-07-14 PROCEDURE — 96375 TX/PRO/DX INJ NEW DRUG ADDON: CPT

## 2025-07-14 PROCEDURE — 80053 COMPREHEN METABOLIC PANEL: CPT | Performed by: EMERGENCY MEDICINE

## 2025-07-14 PROCEDURE — 85025 COMPLETE CBC W/AUTO DIFF WBC: CPT | Performed by: EMERGENCY MEDICINE

## 2025-07-14 RX ORDER — KETOROLAC TROMETHAMINE 30 MG/ML
15 INJECTION, SOLUTION INTRAMUSCULAR; INTRAVENOUS ONCE
Status: COMPLETED | OUTPATIENT
Start: 2025-07-14 | End: 2025-07-14

## 2025-07-14 RX ORDER — TAMSULOSIN HYDROCHLORIDE 0.4 MG/1
0.4 CAPSULE ORAL
Qty: 7 CAPSULE | Refills: 0 | Status: SHIPPED | OUTPATIENT
Start: 2025-07-14

## 2025-07-14 RX ORDER — OXYCODONE AND ACETAMINOPHEN 5; 325 MG/1; MG/1
1 TABLET ORAL EVERY 6 HOURS PRN
Qty: 15 TABLET | Refills: 0 | Status: SHIPPED | OUTPATIENT
Start: 2025-07-14 | End: 2025-07-24

## 2025-07-14 RX ORDER — NAPROXEN 500 MG/1
500 TABLET ORAL 2 TIMES DAILY PRN
Qty: 20 TABLET | Refills: 0 | Status: SHIPPED | OUTPATIENT
Start: 2025-07-14

## 2025-07-14 RX ORDER — ACETAMINOPHEN 10 MG/ML
1000 INJECTION, SOLUTION INTRAVENOUS ONCE
Status: COMPLETED | OUTPATIENT
Start: 2025-07-14 | End: 2025-07-14

## 2025-07-14 RX ORDER — ONDANSETRON 4 MG/1
4 TABLET, ORALLY DISINTEGRATING ORAL EVERY 8 HOURS PRN
Qty: 10 TABLET | Refills: 0 | Status: SHIPPED | OUTPATIENT
Start: 2025-07-14

## 2025-07-14 RX ORDER — ONDANSETRON 2 MG/ML
4 INJECTION INTRAMUSCULAR; INTRAVENOUS ONCE
Status: COMPLETED | OUTPATIENT
Start: 2025-07-14 | End: 2025-07-14

## 2025-07-14 RX ADMIN — SODIUM CHLORIDE 1000 ML: 0.9 INJECTION, SOLUTION INTRAVENOUS at 11:07

## 2025-07-14 RX ADMIN — ONDANSETRON 4 MG: 2 INJECTION INTRAMUSCULAR; INTRAVENOUS at 11:07

## 2025-07-14 RX ADMIN — KETOROLAC TROMETHAMINE 15 MG: 30 INJECTION, SOLUTION INTRAMUSCULAR; INTRAVENOUS at 11:09

## 2025-07-14 RX ADMIN — ACETAMINOPHEN 1000 MG: 10 INJECTION INTRAVENOUS at 11:10

## 2025-07-14 NOTE — TELEPHONE ENCOUNTER
"REASON FOR CONVERSATION: Flank Pain    SYMPTOMS: Right sided flank pain radiating to abdomen rating 7/8, poor appetite. \"Dark\" urine. Thinking there may be blood, no visible clots.    OTHER HEALTH INFORMATION: Known stones on imaging from June    PROTOCOL DISPOSITION: Discuss with Provider and Call Back Patient, Order Lab Work (overriding See Within 2 Weeks in Office)    CARE ADVICE PROVIDED: Reviewed Hydration, avoidance of bladder irritants and ED precautions including severe pain, persistent nausea and vomiting, inability to urinate, fever over 101     PRACTICE FOLLOW-UP: Ordered urine testing per protocol. At last OV imaging was ordered for next year, patient inquiring if he should have this. Patient concerned as he goes on vacation next Tuesday.       Reason for Disposition   The patient has moderate to severe hydronephrosis without obstructing stone and no pain    Answer Assessment - Initial Assessment Questions  1. When did this pain start?   Last few hours  2. Where is your pain? Is your pain on the left, right, or both sides and does it radiate anywhere?  Right flank pain rating of 8, radiating to abdomen   3. Are you able to rate your pain on a scale of 1-10 with 10 being the worst? Would you say it is mild, moderate, or severe?   8  4. Do you have other symptoms like nausea, vomiting or a fever of 101 or higher?   nauseated  5. Do you have any urinary symptoms such as inability to urinate, urgency, frequency, pain or burning with urination?   no  6. Do you have a history of kidney stones or UTI's?   Yes known stones  7. Have you had a recent urologic procedure or surgery? If yes, what procedure or surgery?   no  8. Have you been to ER, urgent care, PCP, or another specialist for this?   no  9. Have you had recent imaging or urine testing within the last week?   yes    Protocols used: Urology-Flank Pain / Kidney Stones / Hydronephrosis-ADULT-[place]    "

## 2025-07-14 NOTE — TELEPHONE ENCOUNTER
Patient was calling to let the office know that he did go and complete the urine testing. He still does not feel well. Pain, sweating and nauseous, so he is going to the ED.    He asked if he had to go to a [facility] ED. Advised it is easier to see the results of what they say and if they order imaging done while he is there. We can pull from a few locations, like his chart is currently linked to [facility].     Patient decided he will just got to [facility] ED. Asked the locations. He will be going to [place].

## 2025-07-14 NOTE — TELEPHONE ENCOUNTER
Agree with recommendations.  Once patient is seen in the ER can review visit and any labs/imaging that were ordered and provide plan of care moving forward.

## 2025-07-14 NOTE — ED PROVIDER NOTES
Time reflects when diagnosis was documented in both MDM as applicable and the Disposition within this note       Time User Action Codes Description Comment    7/14/2025  2:00 PM Winston Stahl Add [N20.1] Ureterolithiasis           ED Disposition       ED Disposition   Discharge    Condition   Stable    Date/Time   Mon Jul 14, 2025  1:59 PM    Comment   Slick Farah discharge to home/self care.                   Assessment & Plan       Medical Decision Making        Initial ED assessment:     59-year-old male presents with right flank pain.  Pain is severe, history of kidney stones feels similar to kidney stones.    Pathology at risk for includes but is not limited to:    Nephrolithiasis.  Infected nephrolithiasis, less likely musculoskeletal pain.,  Less likely appendicitis, less likely AAA    Initial ED plan:    IV pain control, will give Toradol, will give IV Tylenol, CT to evaluate for nephrolithiasis, UA to evaluate for infected urinary tract infection        Final ED summary/disposition:   After evaluation and workup in the emergency department, pain controlled very well, with Toradol and IV Tylenol,  did discuss criteria for admission, does not meet any of these criteria at this time.  He feels comfortable discharge plan.  He will follow with outpatient urology.  Called in prescriptions for Zofran, Percocet, Naprosyn, and Flomax.    Amount and/or Complexity of Data Reviewed  Labs: ordered.  Radiology: ordered.    Risk  Prescription drug management.        ED Course as of 07/14/25 1501   Mon Jul 14, 2025   1249 Repeat examination patient feeling much more comfortable.,  Updated patient and his wife.       Medications   sodium chloride 0.9 % bolus 1,000 mL (0 mL Intravenous Stopped 7/14/25 1211)   ketorolac (TORADOL) injection 15 mg (15 mg Intravenous Given 7/14/25 1109)   acetaminophen (Ofirmev) injection 1,000 mg (0 mg Intravenous Stopped 7/14/25 1211)   ondansetron (ZOFRAN) injection 4 mg (4 mg  Intravenous Given 7/14/25 1107)       ED Risk Strat Scores                    No data recorded        SBIRT 20yo+      Flowsheet Row Most Recent Value   Initial Alcohol Screen: US AUDIT-C     1. How often do you have a drink containing alcohol? 0 Filed at: 07/14/2025 1052   2. How many drinks containing alcohol do you have on a typical day you are drinking?  0 Filed at: 07/14/2025 1052   3b. FEMALE Any Age, or MALE 65+: How often do you have 4 or more drinks on one occassion? 0 Filed at: 07/14/2025 1052   Audit-C Score 0 Filed at: 07/14/2025 1052   PATRIC: How many times in the past year have you...    Used an illegal drug or used a prescription medication for non-medical reasons? Never Filed at: 07/14/2025 1052                            History of Present Illness       Chief Complaint   Patient presents with    Flank Pain     Pt c/o R sided flank pain, starting this morning. Hx Kidney stones. States pain feels similar. +Nausea        Past Medical History[1]   Past Surgical History[2]   Family History[3]   Social History[4]   E-Cigarette/Vaping    E-Cigarette Use Never User       E-Cigarette/Vaping Substances    Nicotine No     THC No     CBD No     Flavoring No     Other No     Unknown No       I have reviewed and agree with the history as documented.     59-year-old male presents with right-sided flank pain.   history of kidney stones.  States this feels similar to kidney stones in the past.  No falls or injury.  Started a few hours prior to arrival.  Has had to have interventions on kidney stones in the past including lithotripsy stenting and retrieval's.  No dysuria no polyuria.  Did contact his outpatient team earlier in the day they sent him for a UA, pain became so severe after obtaining the UA that he came to the ER for evaluation.  Patient currently complains in severe 10 out of 10 right flank pain that radiates to his groin.      History provided by:  Patient and spouse  Flank Pain  Pain location:  R  flank  Pain quality: sharp    Pain radiates to:  Groin  Pain severity:  Moderate  Onset quality:  Gradual  Duration:  4 hours  Timing:  Constant  Progression:  Worsening  Chronicity:  Recurrent  Relieved by:  None tried  Worsened by:  Nothing  Ineffective treatments:  None tried  Associated symptoms: no chest pain, no chills, no constipation, no cough, no diarrhea, no dysuria, no fever, no nausea, no shortness of breath, no sore throat and no vomiting        Review of Systems   Constitutional:  Negative for activity change, chills, diaphoresis and fever.   HENT:  Negative for congestion, sinus pressure and sore throat.    Eyes:  Negative for pain and visual disturbance.   Respiratory:  Negative for cough, chest tightness, shortness of breath, wheezing and stridor.    Cardiovascular:  Negative for chest pain and palpitations.   Gastrointestinal:  Negative for abdominal distention, abdominal pain, constipation, diarrhea, nausea and vomiting.   Genitourinary:  Positive for flank pain. Negative for dysuria and frequency.   Musculoskeletal:  Negative for neck pain and neck stiffness.   Skin:  Negative for rash.   Neurological:  Negative for dizziness, speech difficulty, light-headedness, numbness and headaches.           Objective       ED Triage Vitals [07/14/25 1051]   Temperature Pulse Blood Pressure Respirations SpO2 Patient Position - Orthostatic VS   98 °F (36.7 °C) 92 158/94 18 97 % Sitting      Temp Source Heart Rate Source BP Location FiO2 (%) Pain Score    Oral Monitor Right arm -- 9      Vitals      Date and Time Temp Pulse SpO2 Resp BP Pain Score FACES Pain Rating User   07/14/25 1230 -- 81 97 % 18 133/73 2 -- MO   07/14/25 1130 -- 82 97 % 18 139/74 -- -- KB   07/14/25 1109 -- -- -- -- -- 10 - Worst Possible Pain --    07/14/25 1051 98 °F (36.7 °C) 92 97 % 18 158/94 9 -- LS            Physical Exam  Vitals reviewed.   Constitutional:       General: He is not in acute distress.     Appearance: He is  well-developed. He is not diaphoretic.   HENT:      Head: Normocephalic and atraumatic.      Right Ear: External ear normal.      Left Ear: External ear normal.      Nose: Nose normal.     Eyes:      General:         Right eye: No discharge.         Left eye: No discharge.      Pupils: Pupils are equal, round, and reactive to light.     Neck:      Trachea: No tracheal deviation.     Cardiovascular:      Rate and Rhythm: Normal rate and regular rhythm.      Heart sounds: Normal heart sounds. No murmur heard.  Pulmonary:      Effort: Pulmonary effort is normal. No respiratory distress.      Breath sounds: Normal breath sounds. No stridor.   Abdominal:      General: There is no distension.      Palpations: Abdomen is soft.      Tenderness: There is no abdominal tenderness. There is right CVA tenderness. There is no guarding or rebound.     Musculoskeletal:         General: Normal range of motion.      Cervical back: Normal range of motion and neck supple.     Skin:     General: Skin is warm and dry.      Coloration: Skin is not pale.      Findings: No erythema.     Neurological:      General: No focal deficit present.      Mental Status: He is alert and oriented to person, place, and time.         Results Reviewed       Procedure Component Value Units Date/Time    Comprehensive metabolic panel [[id number]] Collected: 07/14/25 1107    Lab Status: Final result Specimen: Blood from Arm, Right Updated: 07/14/25 1138     Sodium 135 mmol/L      Potassium 4.2 mmol/L      Chloride 102 mmol/L      CO2 26 mmol/L      ANION GAP 7 mmol/L      BUN 25 mg/dL      Creatinine 1.02 mg/dL      Glucose 124 mg/dL      Calcium 9.0 mg/dL      AST 24 U/L      ALT 32 U/L      Alkaline Phosphatase 71 U/L      Total Protein 6.8 g/dL      Albumin 4.4 g/dL      Total Bilirubin 1.00 mg/dL      eGFR 78 ml/min/1.73sq m     Narrative:      National Kidney Disease Foundation guidelines for Chronic Kidney Disease (CKD):     Stage 1 with normal or high  GFR (GFR > 90 mL/min/1.73 square meters)    Stage 2 Mild CKD (GFR = 60-89 mL/min/1.73 square meters)    Stage 3A Moderate CKD (GFR = 45-59 mL/min/1.73 square meters)    Stage 3B Moderate CKD (GFR = 30-44 mL/min/1.73 square meters)    Stage 4 Severe CKD (GFR = 15-29 mL/min/1.73 square meters)    Stage 5 End Stage CKD (GFR <15 mL/min/1.73 square meters)  Note: GFR calculation is accurate only with a steady state creatinine    CBC and differential [[id number]]  (Abnormal) Collected: 07/14/25 1107    Lab Status: Final result Specimen: Blood from Arm, Right Updated: 07/14/25 1120     WBC 8.83 Thousand/uL      RBC 5.45 Million/uL      Hemoglobin 16.6 g/dL      Hematocrit 49.2 %      MCV 90 fL      MCH 30.5 pg      MCHC 33.7 g/dL      RDW 12.5 %      MPV 9.3 fL      Platelets 179 Thousands/uL      nRBC 0 /100 WBCs      Segmented % 77 %      Immature Grans % 0 %      Lymphocytes % 16 %      Monocytes % 6 %      Eosinophils Relative 1 %      Basophils Relative 0 %      Absolute Neutrophils 6.82 Thousands/µL      Absolute Immature Grans 0.03 Thousand/uL      Absolute Lymphocytes 1.37 Thousands/µL      Absolute Monocytes 0.54 Thousand/µL      Eosinophils Absolute 0.05 Thousand/µL      Basophils Absolute 0.02 Thousands/µL             CT renal stone study abdomen pelvis without contrast   Final Interpretation by Jony Rosenthal MD (07/14 1305)      5 x 4 mm calculus in the proximal right ureter with moderate right hydronephrosis. Additional bilateral renal calculi. Urinary bladder calculus.         Workstation performed: [id number]             Procedures    ED Medication and Procedure Management   Prior to Admission Medications   Prescriptions Last Dose Informant Patient Reported? Taking?   Ascorbic Acid (Vitamin C) 500 MG CHEW  Self Yes No   Sig: Chew 1 Dose in the morning.   Cholecalciferol (D3-1000) 25 MCG (1000 UT) capsule  Self Yes No   Sig: Take 2 capsules by mouth in the morning.   Cinnamon 500 MG capsule  Self Yes No    Loratadine 10 MG CAPS  Self Yes No   Sig: Take 1 capsule by mouth in the morning.   Magnesium 500 MG TABS  Self Yes No   Sig: Take 1 tablet by mouth in the morning.   Multiple Vitamin (MULTIVITAMIN) capsule  Self Yes No   Sig: Take 1 capsule by mouth in the morning.   Zinc 50 MG TABS  Self Yes No   Sig: Take 1 tablet by mouth in the morning.   aspirin 81 MG tablet  Self Yes No   Sig: Take 81 mg by mouth in the morning.   atorvastatin (LIPITOR) 80 mg tablet   No No   Sig: TAKE 1 TABLET DAILY   b complex vitamins capsule  Self Yes No   Sig: Take 1 capsule by mouth in the morning.   ezetimibe (ZETIA) 10 mg tablet   No No   Sig: Take 1 tablet (10 mg total) by mouth daily   fluticasone (FLONASE) 50 mcg/act nasal spray  Self No No   Sig: SPRAY 2 SPRAYS INTO EACH NOSTRIL EVERY DAY   meloxicam (Mobic) 7.5 mg tablet  Self No No   Sig: Take 1 tablet (7.5 mg total) by mouth daily   tadalafil (CIALIS) 5 MG tablet  Self No No   Sig: Take 1 tablet (5 mg total) by mouth daily at bedtime   tirzepatide (Zepbound) 5 mg/0.5 mL auto-injector   No No   Sig: Inject 0.5 mL (5 mg total) under the skin once a week   vitamin E, tocopherol, 400 units capsule  Self Yes No   Sig: Take 400 Units by mouth in the morning.      Facility-Administered Medications: None     Discharge Medication List as of 7/14/2025  2:04 PM        START taking these medications    Details   naproxen (NAPROSYN) 500 mg tablet Take 1 tablet (500 mg total) by mouth 2 (two) times a day as needed for mild pain, Starting Mon 7/14/2025, Normal      ondansetron (ZOFRAN-ODT) 4 mg disintegrating tablet Take 1 tablet (4 mg total) by mouth every 8 (eight) hours as needed for nausea or vomiting for up to 10 doses, Starting Mon 7/14/2025, Normal      oxyCODONE-acetaminophen (PERCOCET) 5-325 mg per tablet Take 1 tablet by mouth every 6 (six) hours as needed for severe pain for up to 10 days 1 tab PO q6 hours PRN pain.  Label no driving, no greater then 3g Acetaminophen per day Max  Daily Amount: 4 tablets, Starting Mon 7/14/2025, Until Thu 7/24/2025 at 235 9, Normal      tamsulosin (FLOMAX) 0.4 mg Take 1 capsule (0.4 mg total) by mouth daily with dinner, Starting Mon 7/14/2025, Normal           CONTINUE these medications which have NOT CHANGED    Details   tirzepatide (Zepbound) 5 mg/0.5 mL auto-injector Inject 0.5 mL (5 mg total) under the skin once a week, Starting Fri 7/11/2025, Normal      Ascorbic Acid (Vitamin C) 500 MG CHEW Chew 1 Dose in the morning., Starting Wed 7/1/2020, Historical Med      aspirin 81 MG tablet Take 81 mg by mouth in the morning., Historical Med      atorvastatin (LIPITOR) 80 mg tablet TAKE 1 TABLET DAILY, Starting Tue 5/20/2025, Normal      b complex vitamins capsule Take 1 capsule by mouth in the morning., Historical Med      Cholecalciferol (D3-1000) 25 MCG (1000 UT) capsule Take 2 capsules by mouth in the morning., Starting Wed 4/1/2020, Historical Med      Cinnamon 500 MG capsule Historical Med      ezetimibe (ZETIA) 10 mg tablet Take 1 tablet (10 mg total) by mouth daily, Starting Tue 5/13/2025, Until Fri 5/8/2026, Normal      fluticasone (FLONASE) 50 mcg/act nasal spray SPRAY 2 SPRAYS INTO EACH NOSTRIL EVERY DAY, Normal      Loratadine 10 MG CAPS Take 1 capsule by mouth in the morning., Starting Sat 3/31/2018, Historical Med      Magnesium 500 MG TABS Take 1 tablet by mouth in the morning., Starting Wed 7/1/2020, Historical Med      meloxicam (Mobic) 7.5 mg tablet Take 1 tablet (7.5 mg total) by mouth daily, Starting Mon 2/17/2025, Until Wed 6/11/2025, Normal      Multiple Vitamin (MULTIVITAMIN) capsule Take 1 capsule by mouth in the morning., Historical Med      tadalafil (CIALIS) 5 MG tablet Take 1 tablet (5 mg total) by mouth daily at bedtime, Starting Mon 2/17/2025, Normal      vitamin E, tocopherol, 400 units capsule Take 400 Units by mouth in the morning., Historical Med      Zinc 50 MG TABS Take 1 tablet by mouth in the morning., Starting Fri 5/1/2020,  Historical Med           No discharge procedures on file.  ED SEPSIS DOCUMENTATION   Time reflects when diagnosis was documented in both MDM as applicable and the Disposition within this note       Time User Action Codes Description Comment    7/14/2025  2:00 PM Winston Stahl Add [N20.1] Ureterolithiasis                      [1]   Past Medical History:  Diagnosis Date    Acute sinusitis     Allergic 1999    Seasonal    Allergic rhinitis     BPH (benign prostatic hypertrophy) 2018    Chronic sinusitis     last assessed 10/23/2012    Clotting disorder (HCC) 2022    On and off for months until the last week. Multiple times a day.    Diverticulosis 2021    Identified by recent CT scan and last Colonoscopy    Fractures 1990    Left hand    Hyperlipemia     Kidney stone 5/2001    Seeing Urologist yearly    Other tear of medial meniscus, current injury, left knee, initial encounter 10/21/2022   [2]   Past Surgical History:  Procedure Laterality Date    CHOLECYSTECTOMY      Unsure of date    COLONOSCOPY N/A 01/22/2016    Procedure: COLONOSCOPY;  Surgeon: Elijah Whitfield MD;  Location: [facility];  Service:     CYSTOSCOPY      4/13/22    HERNIA REPAIR  7/1999    Inguinal    KIDNEY STONE SURGERY      In your records    KNEE SURGERY  1998    Both knees scoped, unsure of dates    LITHOTRIPSY      In your records    ORTHOPEDIC SURGERY Right 1997    Knee    HI ARTHRS KNE SURG W/MENISCECTOMY MED/LAT W/SHVG Left 11/16/2022    Procedure: ARTHROSCOPY KNEE with partial medial  MENISECTOMY and patellofemoral chondroplasty;  Surgeon: Alvin Herrera MD;  Location: [facility];  Service: Orthopedics    HI CYSTOURETHROSCOPY N/A 04/13/2022    Procedure: CYSTO;  Surgeon: Jairo Sanford MD;  Location: [facility];  Service: Urology    HI LITHOLAPAXY COMP/LG > 2.5 CM N/A 04/13/2022    Procedure: LITHOLOPAXY LASER;  Surgeon: Jairo Sanford MD;  Location: [facility];  Service: Urology    SHOULDER ARTHROSCOPY Right     SHOULDER  SURGERY  1983    Rt shoulder   [3]   Family History  Problem Relation Name Age of Onset    Nephrolithiasis Mother Marcela     Hyperlipidemia Mother Marcela         Under treatment    Hypertension Mother Marcela     Urolithiasis Mother Marcela     Coronary artery disease Mother Marcela     COPD Mother Marcela         Under treatment    Hyperlipidemia Father Slick         Under treatment    Nephrolithiasis Brother      Multiple sclerosis Sister      Coronary artery disease Maternal Grandmother      Autoimmune disease Sister Lilia         MS diagnosed in 2018    Cancer Paternal Grandfather Slick         Esophageal   [4]   Social History  Tobacco Use    Smoking status: Never    Smokeless tobacco: Never   Vaping Use    Vaping status: Never Used   Substance Use Topics    Alcohol use: Yes     Alcohol/week: 6.0 standard drinks of alcohol     Types: 6 Cans of beer per week     Comment: social    Drug use: Never        Winston Stahl,   07/14/25 0758

## 2025-07-15 ENCOUNTER — PATIENT MESSAGE (OUTPATIENT)
Dept: DERMATOLOGY | Facility: CLINIC | Age: 60
End: 2025-07-15

## 2025-07-15 LAB — BACTERIA UR CULT: NORMAL

## 2025-07-16 ENCOUNTER — PATIENT MESSAGE (OUTPATIENT)
Dept: UROLOGY | Facility: AMBULATORY SURGERY CENTER | Age: 60
End: 2025-07-16

## 2025-07-18 NOTE — PATIENT COMMUNICATION
Patient called in attempt to schedule appt after he returns from his trip.    Office clerical was notified via SecureChat and will schedule the patient.  Pt is to monitor MyChart for appt date and time and will call back if he needs to reschedule.

## 2025-08-07 DIAGNOSIS — E66.811 CLASS 1 OBESITY DUE TO DISRUPTION OF MC4R PATHWAY WITH SERIOUS COMORBIDITY AND BODY MASS INDEX (BMI) OF 31.0 TO 31.9 IN ADULT: ICD-10-CM

## 2025-08-07 DIAGNOSIS — Z15.2 CLASS 1 OBESITY DUE TO DISRUPTION OF MC4R PATHWAY WITH SERIOUS COMORBIDITY AND BODY MASS INDEX (BMI) OF 31.0 TO 31.9 IN ADULT: ICD-10-CM

## 2025-08-07 DIAGNOSIS — E88.82 CLASS 1 OBESITY DUE TO DISRUPTION OF MC4R PATHWAY WITH SERIOUS COMORBIDITY AND BODY MASS INDEX (BMI) OF 31.0 TO 31.9 IN ADULT: ICD-10-CM

## 2025-08-07 RX ORDER — TIRZEPATIDE 5 MG/.5ML
5 INJECTION, SOLUTION SUBCUTANEOUS WEEKLY
Qty: 2 ML | Refills: 0 | Status: SHIPPED | OUTPATIENT
Start: 2025-08-07

## 2025-08-18 ENCOUNTER — TELEPHONE (OUTPATIENT)
Dept: UROLOGY | Facility: AMBULATORY SURGERY CENTER | Age: 60
End: 2025-08-18

## 2025-08-20 ENCOUNTER — OFFICE VISIT (OUTPATIENT)
Dept: UROLOGY | Facility: AMBULATORY SURGERY CENTER | Age: 60
End: 2025-08-20
Payer: COMMERCIAL

## 2025-08-20 VITALS
HEART RATE: 75 BPM | WEIGHT: 224 LBS | SYSTOLIC BLOOD PRESSURE: 140 MMHG | BODY MASS INDEX: 29.69 KG/M2 | HEIGHT: 73 IN | DIASTOLIC BLOOD PRESSURE: 82 MMHG | OXYGEN SATURATION: 99 %

## 2025-08-20 DIAGNOSIS — N20.0 NEPHROLITHIASIS: ICD-10-CM

## 2025-08-20 DIAGNOSIS — R10.9 FLANK PAIN: Primary | ICD-10-CM

## 2025-08-20 DIAGNOSIS — N40.1 BENIGN PROSTATIC HYPERPLASIA WITH LOWER URINARY TRACT SYMPTOMS, SYMPTOM DETAILS UNSPECIFIED: ICD-10-CM

## 2025-08-20 LAB
SL AMB  POCT GLUCOSE, UA: NORMAL
SL AMB LEUKOCYTE ESTERASE,UA: NORMAL
SL AMB POCT BILIRUBIN,UA: NORMAL
SL AMB POCT BLOOD,UA: NORMAL
SL AMB POCT CLARITY,UA: CLEAR
SL AMB POCT COLOR,UA: YELLOW
SL AMB POCT KETONES,UA: NORMAL
SL AMB POCT NITRITE,UA: NORMAL
SL AMB POCT PH,UA: 6.5
SL AMB POCT SPECIFIC GRAVITY,UA: 1
SL AMB POCT URINE PROTEIN: NORMAL
SL AMB POCT UROBILINOGEN: NORMAL

## 2025-08-20 PROCEDURE — 99214 OFFICE O/P EST MOD 30 MIN: CPT

## 2025-08-20 PROCEDURE — 81003 URINALYSIS AUTO W/O SCOPE: CPT

## 2025-08-20 RX ORDER — ALFUZOSIN HYDROCHLORIDE 10 MG/1
10 TABLET, EXTENDED RELEASE ORAL DAILY
Qty: 30 TABLET | Refills: 3 | Status: SHIPPED | OUTPATIENT
Start: 2025-08-20 | End: 2025-12-18

## 2025-08-22 ENCOUNTER — HOSPITAL ENCOUNTER (OUTPATIENT)
Dept: RADIOLOGY | Age: 60
Discharge: HOME | End: 2025-08-22
Payer: COMMERCIAL

## 2025-08-22 DIAGNOSIS — N20.0 NEPHROLITHIASIS: ICD-10-CM

## 2025-08-22 PROCEDURE — 76775 US EXAM ABDO BACK WALL LIM: CPT

## (undated) DEVICE — 1200CC GUARDIAN II: Brand: GUARDIAN

## (undated) DEVICE — PACK TUR

## (undated) DEVICE — SPONGE PVP SCRUB WING STERILE

## (undated) DEVICE — TUBING SUCTION 5MM X 12 FT

## (undated) DEVICE — GLOVE INDICATOR PI UNDERGLOVE SZ 8.5 BLUE

## (undated) DEVICE — GUIDEWIRE STRGHT TIP 0.035 IN  SOLO PLUS

## (undated) DEVICE — NEEDLE 25G X 1 1/2

## (undated) DEVICE — STOCKINETTE REGULAR

## (undated) DEVICE — DRAPE SHEET THREE QUARTER

## (undated) DEVICE — CURITY NON-ADHERENT STRIPS: Brand: CURITY

## (undated) DEVICE — SUT ETHILON 2-0 FSLX 30 IN 1674H

## (undated) DEVICE — GLOVE INDICATOR PI UNDERGLOVE SZ 8 BLUE

## (undated) DEVICE — GLOVE INDICATOR PI UNDERGLOVE SZ 6.5 BLUE

## (undated) DEVICE — INTENDED FOR TISSUE SEPARATION, AND OTHER PROCEDURES THAT REQUIRE A SHARP SURGICAL BLADE TO PUNCTURE OR CUT.: Brand: BARD-PARKER ® CARBON RIB-BACK BLADES

## (undated) DEVICE — ABDOMINAL PAD: Brand: DERMACEA

## (undated) DEVICE — BLADE SHAVER TORPEDO CRV 4MM 13MM COOLCUT

## (undated) DEVICE — BLADE SHAVER DISSECTOR 3.5MM 13CM COOLCUT

## (undated) DEVICE — BETHLEHEM UNIVERSAL  ARTHRO PK: Brand: CARDINAL HEALTH

## (undated) DEVICE — ACE WRAP 6 IN UNSTERILE

## (undated) DEVICE — 3000CC GUARDIAN II: Brand: GUARDIAN

## (undated) DEVICE — FIBER STD QUANTA 365 MICRON

## (undated) DEVICE — LIGHT GLOVE GREEN

## (undated) DEVICE — CUFF TOURNIQUET 30 X 4 IN QUICK CONNECT DISP 1BLA

## (undated) DEVICE — GAUZE SPONGES,16 PLY: Brand: CURITY

## (undated) DEVICE — UROCATCH BAG

## (undated) DEVICE — GLOVE SRG BIOGEL 7.5

## (undated) DEVICE — CATH URETERAL 5FR X 70 CM FLEX TIP POLYUR BARD

## (undated) DEVICE — SCD SEQUENTIAL COMPRESSION COMFORT SLEEVE MEDIUM KNEE LENGTH: Brand: KENDALL SCD

## (undated) DEVICE — GLOVE SRG BIOGEL 8

## (undated) DEVICE — U-DRAPE: Brand: CONVERTORS

## (undated) DEVICE — TUBING ARTHROSCOPIC WAVE  MAIN PUMP

## (undated) DEVICE — ARTHROSCOPY FLOOR MAT

## (undated) DEVICE — PADDING CAST 4 IN  COTTON STRL

## (undated) DEVICE — GLOVE PI ULTRA TOUCH SZ.6.5